# Patient Record
Sex: FEMALE | Race: WHITE | NOT HISPANIC OR LATINO | Employment: UNEMPLOYED | URBAN - METROPOLITAN AREA
[De-identification: names, ages, dates, MRNs, and addresses within clinical notes are randomized per-mention and may not be internally consistent; named-entity substitution may affect disease eponyms.]

---

## 2023-04-28 PROBLEM — K57.30 DIVERTICULOSIS OF COLON: Status: ACTIVE | Noted: 2023-04-28

## 2023-04-28 PROBLEM — E55.9 MILD VITAMIN D DEFICIENCY: Status: ACTIVE | Noted: 2023-04-28

## 2023-04-28 PROBLEM — R60.0 BILATERAL LOWER EXTREMITY EDEMA: Status: ACTIVE | Noted: 2023-04-28

## 2023-04-28 PROBLEM — I34.0 MITRAL VALVE INSUFFICIENCY: Status: ACTIVE | Noted: 2023-04-28

## 2023-04-28 PROBLEM — G89.29 CHRONIC LOW BACK PAIN: Status: ACTIVE | Noted: 2023-04-28

## 2023-04-28 PROBLEM — I10 BENIGN ESSENTIAL HYPERTENSION: Status: ACTIVE | Noted: 2023-04-28

## 2023-04-28 PROBLEM — K59.09 CHRONIC CONSTIPATION: Status: ACTIVE | Noted: 2023-04-28

## 2023-04-28 PROBLEM — I87.2 VENOUS REFLUX: Status: ACTIVE | Noted: 2023-04-28

## 2023-04-28 PROBLEM — E78.5 HYPERLIPIDEMIA: Status: ACTIVE | Noted: 2023-04-28

## 2023-04-28 PROBLEM — R60.0 BILATERAL LOWER EXTREMITY EDEMA: Chronic | Status: ACTIVE | Noted: 2023-04-28

## 2023-04-28 PROBLEM — M54.16 LUMBAR RADICULOPATHY: Status: ACTIVE | Noted: 2023-04-28

## 2023-04-28 PROBLEM — M54.50 CHRONIC LOW BACK PAIN: Status: ACTIVE | Noted: 2023-04-28

## 2023-04-28 PROBLEM — K59.09 CHRONIC CONSTIPATION: Chronic | Status: ACTIVE | Noted: 2023-04-28

## 2023-04-28 PROBLEM — N28.9 MILD RENAL INSUFFICIENCY: Status: ACTIVE | Noted: 2023-04-28

## 2023-04-28 PROBLEM — I10 BENIGN ESSENTIAL HYPERTENSION: Chronic | Status: ACTIVE | Noted: 2023-04-28

## 2023-04-28 RX ORDER — LOSARTAN POTASSIUM 100 MG/1
100 TABLET ORAL DAILY
COMMUNITY
Start: 2022-08-12 | End: 2023-11-01 | Stop reason: SDUPTHER

## 2023-04-28 RX ORDER — POLYETHYLENE GLYCOL 3350 17 G/17G
17 POWDER, FOR SOLUTION ORAL DAILY
COMMUNITY
Start: 2021-09-22

## 2023-04-28 RX ORDER — HYDROCHLOROTHIAZIDE 12.5 MG/1
12.5 CAPSULE ORAL EVERY MORNING
COMMUNITY
Start: 2020-05-06 | End: 2023-11-01 | Stop reason: SDUPTHER

## 2023-04-28 RX ORDER — METOPROLOL TARTRATE 50 MG/1
50 TABLET ORAL 2 TIMES DAILY
COMMUNITY
Start: 2020-03-23 | End: 2023-08-03

## 2023-04-28 RX ORDER — ASPIRIN 81 MG/1
81 TABLET ORAL DAILY
COMMUNITY
Start: 2018-04-24

## 2023-04-28 RX ORDER — GLUCOSAMINE SULFATE DIPOT CHLR 1000 MG
1 TABLET ORAL DAILY
COMMUNITY

## 2023-04-28 RX ORDER — PHENOL 1.4 %
1 AEROSOL, SPRAY (ML) MUCOUS MEMBRANE DAILY
COMMUNITY
Start: 2018-04-24

## 2023-04-28 NOTE — ASSESSMENT & PLAN NOTE
Goal BP >130/80  Continue losartan 100mg, hydrochlorothiazide 12.5mg, and metoprolol 50mg BID  Work on maintaining a healthy weight, monitoring sodium intake, consistent activity and exercise  Avoid chronic use of NSAIDs  Do not use sudafed for decongestant when ill

## 2023-04-28 NOTE — ASSESSMENT & PLAN NOTE
Chronic Venous Insuffienccy vs Sleep Apnea vs RV dysfunction  Cards evaluating  Venous doppler + reflux  Patient declines sleep apnea testing  Swelling improved after switching off amlodipine and onto losartan   Recommendation compression stockings  Consider vein stripping or other vascular intervention

## 2023-05-01 ENCOUNTER — TELEPHONE (OUTPATIENT)
Dept: PRIMARY CARE | Facility: CLINIC | Age: 88
End: 2023-05-01

## 2023-05-01 ENCOUNTER — OFFICE VISIT (OUTPATIENT)
Dept: PRIMARY CARE | Facility: CLINIC | Age: 88
End: 2023-05-01
Payer: MEDICARE

## 2023-05-01 ENCOUNTER — LAB (OUTPATIENT)
Dept: LAB | Facility: LAB | Age: 88
End: 2023-05-01
Payer: MEDICARE

## 2023-05-01 VITALS
DIASTOLIC BLOOD PRESSURE: 72 MMHG | HEIGHT: 61 IN | OXYGEN SATURATION: 98 % | SYSTOLIC BLOOD PRESSURE: 126 MMHG | HEART RATE: 58 BPM | WEIGHT: 155 LBS | BODY MASS INDEX: 29.27 KG/M2 | RESPIRATION RATE: 16 BRPM

## 2023-05-01 DIAGNOSIS — Z00.00 MEDICARE ANNUAL WELLNESS VISIT, SUBSEQUENT: Primary | ICD-10-CM

## 2023-05-01 DIAGNOSIS — R68.89 ABNORMAL ANKLE BRACHIAL INDEX (ABI): ICD-10-CM

## 2023-05-01 DIAGNOSIS — R60.0 BILATERAL LOWER EXTREMITY EDEMA: Chronic | ICD-10-CM

## 2023-05-01 DIAGNOSIS — I87.2 VENOUS REFLUX: ICD-10-CM

## 2023-05-01 DIAGNOSIS — K59.09 CHRONIC CONSTIPATION: Chronic | ICD-10-CM

## 2023-05-01 DIAGNOSIS — R20.9 ALTERATIONS OF SENSATIONS: ICD-10-CM

## 2023-05-01 DIAGNOSIS — I10 BENIGN ESSENTIAL HYPERTENSION: Chronic | ICD-10-CM

## 2023-05-01 PROBLEM — I73.9 CLAUDICATION OF LOWER EXTREMITY (CMS-HCC): Status: ACTIVE | Noted: 2023-05-01

## 2023-05-01 LAB
ALANINE AMINOTRANSFERASE (SGPT) (U/L) IN SER/PLAS: 6 U/L (ref 7–45)
ALBUMIN (G/DL) IN SER/PLAS: 3.7 G/DL (ref 3.4–5)
ALKALINE PHOSPHATASE (U/L) IN SER/PLAS: 45 U/L (ref 33–136)
ANION GAP IN SER/PLAS: 10 MMOL/L (ref 10–20)
ASPARTATE AMINOTRANSFERASE (SGOT) (U/L) IN SER/PLAS: 15 U/L (ref 9–39)
BASOPHILS (10*3/UL) IN BLOOD BY AUTOMATED COUNT: 0.04 X10E9/L (ref 0–0.1)
BASOPHILS/100 LEUKOCYTES IN BLOOD BY AUTOMATED COUNT: 0.7 % (ref 0–2)
BILIRUBIN TOTAL (MG/DL) IN SER/PLAS: 0.5 MG/DL (ref 0–1.2)
CALCIUM (MG/DL) IN SER/PLAS: 9.1 MG/DL (ref 8.6–10.3)
CARBON DIOXIDE, TOTAL (MMOL/L) IN SER/PLAS: 32 MMOL/L (ref 21–32)
CHLORIDE (MMOL/L) IN SER/PLAS: 105 MMOL/L (ref 98–107)
CHOLESTEROL (MG/DL) IN SER/PLAS: 195 MG/DL (ref 0–199)
CHOLESTEROL IN HDL (MG/DL) IN SER/PLAS: 68 MG/DL
CHOLESTEROL/HDL RATIO: 2.9
CREATININE (MG/DL) IN SER/PLAS: 1.07 MG/DL (ref 0.5–1.05)
EOSINOPHILS (10*3/UL) IN BLOOD BY AUTOMATED COUNT: 0.19 X10E9/L (ref 0–0.4)
EOSINOPHILS/100 LEUKOCYTES IN BLOOD BY AUTOMATED COUNT: 3.3 % (ref 0–6)
ERYTHROCYTE DISTRIBUTION WIDTH (RATIO) BY AUTOMATED COUNT: 12.8 % (ref 11.5–14.5)
ERYTHROCYTE MEAN CORPUSCULAR HEMOGLOBIN CONCENTRATION (G/DL) BY AUTOMATED: 32.3 G/DL (ref 32–36)
ERYTHROCYTE MEAN CORPUSCULAR VOLUME (FL) BY AUTOMATED COUNT: 98 FL (ref 80–100)
ERYTHROCYTES (10*6/UL) IN BLOOD BY AUTOMATED COUNT: 4.22 X10E12/L (ref 4–5.2)
GFR FEMALE: 49 ML/MIN/1.73M2
GLUCOSE (MG/DL) IN SER/PLAS: 70 MG/DL (ref 74–99)
HEMATOCRIT (%) IN BLOOD BY AUTOMATED COUNT: 41.5 % (ref 36–46)
HEMOGLOBIN (G/DL) IN BLOOD: 13.4 G/DL (ref 12–16)
IMMATURE GRANULOCYTES/100 LEUKOCYTES IN BLOOD BY AUTOMATED COUNT: 0.2 % (ref 0–0.9)
LDL: 108 MG/DL (ref 0–99)
LEUKOCYTES (10*3/UL) IN BLOOD BY AUTOMATED COUNT: 5.8 X10E9/L (ref 4.4–11.3)
LYMPHOCYTES (10*3/UL) IN BLOOD BY AUTOMATED COUNT: 1.19 X10E9/L (ref 0.8–3)
LYMPHOCYTES/100 LEUKOCYTES IN BLOOD BY AUTOMATED COUNT: 20.5 % (ref 13–44)
MONOCYTES (10*3/UL) IN BLOOD BY AUTOMATED COUNT: 0.4 X10E9/L (ref 0.05–0.8)
MONOCYTES/100 LEUKOCYTES IN BLOOD BY AUTOMATED COUNT: 6.9 % (ref 2–10)
NEUTROPHILS (10*3/UL) IN BLOOD BY AUTOMATED COUNT: 3.97 X10E9/L (ref 1.6–5.5)
NEUTROPHILS/100 LEUKOCYTES IN BLOOD BY AUTOMATED COUNT: 68.4 % (ref 40–80)
PLATELETS (10*3/UL) IN BLOOD AUTOMATED COUNT: 172 X10E9/L (ref 150–450)
POTASSIUM (MMOL/L) IN SER/PLAS: 4.1 MMOL/L (ref 3.5–5.3)
PROTEIN TOTAL: 6.4 G/DL (ref 6.4–8.2)
SODIUM (MMOL/L) IN SER/PLAS: 143 MMOL/L (ref 136–145)
TRIGLYCERIDE (MG/DL) IN SER/PLAS: 97 MG/DL (ref 0–149)
UREA NITROGEN (MG/DL) IN SER/PLAS: 21 MG/DL (ref 6–23)
VLDL: 19 MG/DL (ref 0–40)

## 2023-05-01 PROCEDURE — 85025 COMPLETE CBC W/AUTO DIFF WBC: CPT

## 2023-05-01 PROCEDURE — 1036F TOBACCO NON-USER: CPT | Performed by: STUDENT IN AN ORGANIZED HEALTH CARE EDUCATION/TRAINING PROGRAM

## 2023-05-01 PROCEDURE — G0439 PPPS, SUBSEQ VISIT: HCPCS | Performed by: STUDENT IN AN ORGANIZED HEALTH CARE EDUCATION/TRAINING PROGRAM

## 2023-05-01 PROCEDURE — 80061 LIPID PANEL: CPT

## 2023-05-01 PROCEDURE — 36415 COLL VENOUS BLD VENIPUNCTURE: CPT

## 2023-05-01 PROCEDURE — 1160F RVW MEDS BY RX/DR IN RCRD: CPT | Performed by: STUDENT IN AN ORGANIZED HEALTH CARE EDUCATION/TRAINING PROGRAM

## 2023-05-01 PROCEDURE — 3074F SYST BP LT 130 MM HG: CPT | Performed by: STUDENT IN AN ORGANIZED HEALTH CARE EDUCATION/TRAINING PROGRAM

## 2023-05-01 PROCEDURE — 1159F MED LIST DOCD IN RCRD: CPT | Performed by: STUDENT IN AN ORGANIZED HEALTH CARE EDUCATION/TRAINING PROGRAM

## 2023-05-01 PROCEDURE — 99214 OFFICE O/P EST MOD 30 MIN: CPT | Performed by: STUDENT IN AN ORGANIZED HEALTH CARE EDUCATION/TRAINING PROGRAM

## 2023-05-01 PROCEDURE — 1170F FXNL STATUS ASSESSED: CPT | Performed by: STUDENT IN AN ORGANIZED HEALTH CARE EDUCATION/TRAINING PROGRAM

## 2023-05-01 PROCEDURE — 3078F DIAST BP <80 MM HG: CPT | Performed by: STUDENT IN AN ORGANIZED HEALTH CARE EDUCATION/TRAINING PROGRAM

## 2023-05-01 PROCEDURE — 80053 COMPREHEN METABOLIC PANEL: CPT

## 2023-05-01 RX ORDER — DICLOFENAC SODIUM 10 MG/G
4 GEL TOPICAL 4 TIMES DAILY PRN
Qty: 450 G | Refills: 3 | Status: SHIPPED | OUTPATIENT
Start: 2023-05-01

## 2023-05-01 ASSESSMENT — PATIENT HEALTH QUESTIONNAIRE - PHQ9
SUM OF ALL RESPONSES TO PHQ9 QUESTIONS 1 AND 2: 0
2. FEELING DOWN, DEPRESSED OR HOPELESS: NOT AT ALL
1. LITTLE INTEREST OR PLEASURE IN DOING THINGS: NOT AT ALL

## 2023-05-01 ASSESSMENT — ACTIVITIES OF DAILY LIVING (ADL)
GROCERY_SHOPPING: INDEPENDENT
DOING_HOUSEWORK: INDEPENDENT
MANAGING_FINANCES: INDEPENDENT
TAKING_MEDICATION: INDEPENDENT
BATHING: INDEPENDENT
DRESSING: INDEPENDENT

## 2023-05-01 ASSESSMENT — ENCOUNTER SYMPTOMS
DEPRESSION: 0
OCCASIONAL FEELINGS OF UNSTEADINESS: 0
LOSS OF SENSATION IN FEET: 0

## 2023-05-01 NOTE — ASSESSMENT & PLAN NOTE
PNEUMONIA vaccine- Patient refused  SHINGLES vaccine- Patient refused  Screening tests:  Colon cancer screening--> Not Indicated  Breast Cancer screening--> Not Indicated  Cervical Cancer Screening--> Not Indicated  DXA screening-->Patient states completed in the past   During the course of the visit the patient was educated and counseled about age appropriate screening and preventive services. Completed preventive screenings were documented in the chart and orders were placed for outstanding screenings/procedures as documented in the Assessment and Plan.  Patient Instructions (the written plan) was given to the patient at check out.

## 2023-05-01 NOTE — ASSESSMENT & PLAN NOTE
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

## 2023-05-01 NOTE — ASSESSMENT & PLAN NOTE
With symptoms on R which is not the side with abnormal ABIs will proceed with imaging of the Hip and Lumbar Spine  Patient refuses EMG  Treat arthritis with aspirin and topical voltaren gel  Not able to elicit changes on exam today, overall reassuring  Follow up if symptoms changes

## 2023-05-01 NOTE — TELEPHONE ENCOUNTER
----- Message from Laurie Avina DO sent at 5/1/2023  2:53 PM EDT -----  Labs stable: cholesterol is a little high would incorporate more fiber into diet, kidney function slightly lower, but this seems to be her baseline likely would benefit from better hydration

## 2023-05-01 NOTE — PROGRESS NOTES
"Chief Complaint: Medicare Wellness Exam/Comprehensive Problem Focused Follow Up and Physical Exam    HPI:    Switched off amlodipine to losartan  Leg edema improved but now wearing compression   Does have arthritis  Weather depedent     Having some sensation changes in the R leg in the last year  Sensation changes into foot and sometimes pain from R hip downwards  Non radiating but \"sensation is off\"   Keeps saying it doesn't feel right  Has been ongoing for over 1 year  No trauma of the area    Active Problem List  Problem List       Benign essential hypertension (Chronic)    Bilateral lower extremity edema (Chronic)    Chronic constipation (Chronic)     Comprehensive Medical/Surgical/Social/Family History  Past Medical History:   Diagnosis Date    Body mass index (BMI)30.0-30.9, adult 03/22/2021    Body mass index (BMI) of 30.0 to 30.9 in adult    Personal history of other diseases of the musculoskeletal system and connective tissue     History of osteoporosis    Personal history of other diseases of the nervous system and sense organs     History of cataract    Ventricular premature depolarization 03/23/2020    PVC (premature ventricular contraction)     Past Surgical History:   Procedure Laterality Date    HYSTERECTOMY  04/24/2018    Hysterectomy    OTHER SURGICAL HISTORY  03/23/2020    Cataract surgery     Social History     Social History Narrative    Not on file     Allergies and Medications  Lisinopril and Penicillins  Current Outpatient Medications on File Prior to Visit   Medication Sig Dispense Refill    aspirin 81 mg EC tablet Take 1 tablet (81 mg) by mouth once daily.      calcium carb-D3-mag ox-zinc ox ([employer]ox Mag Zinc Plus D3) 333 mg-133 unit -133 mg-5 mg tablet Take 1 tablet by mouth once daily.      hydroCHLOROthiazide (Microzide) 12.5 mg capsule Take 1 capsule (12.5 mg) by mouth once daily in the morning.      losartan (Cozaar) 100 mg tablet Take 1 tablet (100 mg) by mouth once daily.      metoprolol " "tartrate (Lopressor) 50 mg tablet Take 1 tablet (50 mg) by mouth 2 times a day.      multivitamin-min-iron-FA-vit K (Adults Multivitamin) 18 mg iron-400 mcg-25 mcg tablet Take 1 tablet by mouth once daily.      polyethylene glycol (Glycolax) 17 gram/dose powder Take 17 g by mouth once daily.       No current facility-administered medications on file prior to visit.     Medications and Supplements  prescribed by me and other practitioners or clinical pharmacist (such as prescriptions, OTC's, herbal therapies and supplements) were reviewed and documented in the medical record.    Tobacco/Alcohol/Opioid use, as well as Illicit Drug Use was screened for/reviewed and documented in Social History section and medication list as appropriate    Steadi Fall Risk  One or more falls in the last year? No  How many Times?    Was the patient injured in the fall?    Has trouble stepping onto curb? No  Advised to use a cane or walker to get around safely? No  Often has to rush to toilet? No  Feels unsteady when walking? No  Has lost some feeling in feet? No  Often feels sad or depressed? No  Steadies self on furniture while walking at home? No  Takes medicine that makes them feel lightheaded or more tired than usual? No  Worried about Falling? No  Takes medicine to sleep or improve mood? No  Needs to push with hands when rising from a chair? No    Activities of Daily Living  In your present state of health, do you have any difficulty performing the following activities?:   Preparing food and eating?: No  Bathing yourself: No  Getting dressed: No  Using the toilet:No  Moving around from place to place: No  In the past year have you fallen or had a near fall?:No    Depression Screen  (Note: if answer to either of the following is \"Yes\", then a more complete depression screening is indicated)   Q1: Over the past two weeks, have you felt down, depressed or hopeless? No  Q2: Over the past two weeks, have you felt little interest or " "pleasure in doing things? No    Current exercise habits: infrequent at best   Dietary issues discussed: Yes  Hearing difficulties: No  Safe in current home environment: yes  Visual Acuity assessed: no  Cognitive Impairment assessed: yes     Cardiac Risk Assessment  Cardiovascular risk was discussed and, if needed, lifestyle modifications recommended, including nutritional choices, exercise, and elimination of habits contributing to risk. We agreed on a plan to reduce the current cardiovascular risk based on above discussion as needed.  Aspirin use/disuse was discussed after reviewing the updated guidelines below:    Consider low dose Aspirin ( mg) use if the benefit for cardiovascular disease prevention outweighs risk for bleeding complications.   In general, low dose ASA should be considered:  In patients WITHOUT prior MI/stroke/PAD (primary prevention):   a. Age <60: Use if 10-year cardiovascular disease risk >20%, with discussion of risks and benefits with patient  b. Age 60-<70: Use if 10-year cardiovascular disease risk >20% and low bleeding (e.g., gastrointenstinal) risk, with discussion of risks and benefits with patient  c. Age >=70: Do not use    In patients WITH prior MI/stroke/PAD (secondary prevention):   Generally use unless extremely high bleeding (e.g., gastrointenstinal) risk, with discussion of risks and benefits with patient    ROS otherwise negative aside from what was mentioned above in HPI.    Vitals  /72   Pulse 58   Resp 16   Ht 1.549 m (5' 1\")   Wt 70.3 kg (155 lb)   SpO2 98%   BMI 29.29 kg/m²   Body mass index is 29.29 kg/m².    Physical Exam  Constitutional:       Appearance: Normal appearance.   HENT:      Head: Normocephalic and atraumatic.   Eyes:      Extraocular Movements: Extraocular movements intact.   Cardiovascular:      Rate and Rhythm: Normal rate and regular rhythm.      Heart sounds: No murmur heard.  Pulmonary:      Effort: Pulmonary effort is normal. No " respiratory distress.   Musculoskeletal:      Right lower leg: No edema.      Left lower leg: No edema.   Skin:     Coloration: Skin is not jaundiced or pale.   Neurological:      General: No focal deficit present.      Mental Status: She is alert and oriented to person, place, and time.      Cranial Nerves: No cranial nerve deficit.      Motor: No weakness.      Gait: Gait normal.      Comments: On exam not able to elicit sensation changes described by patient   Psychiatric:         Mood and Affect: Mood normal.         Behavior: Behavior normal.         Thought Content: Thought content normal.         Judgment: Judgment normal.     Assessment and Plan:  Problem List Items Addressed This Visit          Nervous    Alterations of sensations    Current Assessment & Plan     With symptoms on R which is not the side with abnormal ABIs will proceed with imaging of the Hip and Lumbar Spine  Patient refuses EMG  Treat arthritis with aspirin and topical voltaren gel  Not able to elicit changes on exam today, overall reassuring  Follow up if symptoms changes         Relevant Medications    diclofenac sodium (Voltaren) 1 % gel gel    Other Relevant Orders    XR lumbar spine complete 4+ views    XR hip right 2 or 3 views       Circulatory    Benign essential hypertension (Chronic)    Current Assessment & Plan     Goal BP >130/80  Continue losartan 100mg, hydrochlorothiazide 12.5mg, and metoprolol 50mg BID  Work on maintaining a healthy weight, monitoring sodium intake, consistent activity and exercise  Avoid chronic use of NSAIDs  Do not use sudafed for decongestant when ill           Venous reflux    Current Assessment & Plan     Chronic Venous Insuffienccy vs Sleep Apnea vs RV dysfunction  Cards evaluating  Venous doppler + reflux  Patient declines sleep apnea testing  Swelling improved after switching off amlodipine and onto losartan   Recommendation compression stockings  Consider vein stripping or other vascular  intervention          Abnormal ankle brachial index (AMANDA)    Current Assessment & Plan     AMANDA performed 09/02/2022 revealed 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         Relevant Orders    Lipid Panel       Digestive    Chronic constipation (Chronic)    Current Assessment & Plan     Being managed with miralax daily             Musculoskeletal    Bilateral lower extremity edema (Chronic)    Current Assessment & Plan     Chronic Venous Insuffienccy vs Sleep Apnea vs RV dysfunction  Cards evaluating  Venous doppler + reflux  Patient declines sleep apnea testing  Swelling improved after switching off amlodipine and onto losartan   Recommendation compression stockings  Consider vein stripping or other vascular intervention          Relevant Orders    CBC and Auto Differential    Comprehensive metabolic panel       Other    Medicare annual wellness visit, subsequent - Primary    Current Assessment & Plan     PNEUMONIA vaccine- Patient refused  SHINGLES vaccine- Patient refused  Screening tests:  Colon cancer screening--> Not Indicated  Breast Cancer screening--> Not Indicated  Cervical Cancer Screening--> Not Indicated  DXA screening-->Patient states completed in the past   During the course of the visit the patient was educated and counseled about age appropriate screening and preventive services. Completed preventive screenings were documented in the chart and orders were placed for outstanding screenings/procedures as documented in the Assessment and Plan.  Patient Instructions (the written plan) was given to the patient at check out.          Laurie Avina,

## 2023-05-03 ENCOUNTER — TELEPHONE (OUTPATIENT)
Dept: PRIMARY CARE | Facility: CLINIC | Age: 88
End: 2023-05-03
Payer: MEDICARE

## 2023-05-03 NOTE — TELEPHONE ENCOUNTER
----- Message from Laurie Avina DO sent at 5/3/2023  7:31 AM EDT -----  Xrays showing progressed age related degeneration of bone of the spine, this is likely the source of her discomfort. Try voltaren gel to see if helps with symptoms.

## 2023-08-03 DIAGNOSIS — I10 BENIGN ESSENTIAL HYPERTENSION: Primary | ICD-10-CM

## 2023-08-03 RX ORDER — METOPROLOL TARTRATE 50 MG/1
50 TABLET ORAL 2 TIMES DAILY
Qty: 180 TABLET | Refills: 0 | Status: SHIPPED | OUTPATIENT
Start: 2023-08-03 | End: 2023-11-01 | Stop reason: SDUPTHER

## 2023-11-01 ENCOUNTER — OFFICE VISIT (OUTPATIENT)
Dept: PRIMARY CARE | Facility: CLINIC | Age: 88
End: 2023-11-01
Payer: MEDICARE

## 2023-11-01 VITALS
BODY MASS INDEX: 28.13 KG/M2 | RESPIRATION RATE: 16 BRPM | HEIGHT: 61 IN | OXYGEN SATURATION: 99 % | HEART RATE: 58 BPM | DIASTOLIC BLOOD PRESSURE: 70 MMHG | SYSTOLIC BLOOD PRESSURE: 137 MMHG | WEIGHT: 149 LBS

## 2023-11-01 DIAGNOSIS — Z23 NEEDS FLU SHOT: ICD-10-CM

## 2023-11-01 DIAGNOSIS — E78.49 OTHER HYPERLIPIDEMIA: ICD-10-CM

## 2023-11-01 DIAGNOSIS — I10 BENIGN ESSENTIAL HYPERTENSION: Primary | ICD-10-CM

## 2023-11-01 DIAGNOSIS — N28.9 MILD RENAL INSUFFICIENCY: ICD-10-CM
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
## 2023-11-01 PROCEDURE — 99214 OFFICE O/P EST MOD 30 MIN: CPT | Performed by: STUDENT IN AN ORGANIZED HEALTH CARE EDUCATION/TRAINING PROGRAM

## 2023-11-01 PROCEDURE — G0008 ADMIN INFLUENZA VIRUS VAC: HCPCS | Performed by: STUDENT IN AN ORGANIZED HEALTH CARE EDUCATION/TRAINING PROGRAM

## 2023-11-01 PROCEDURE — 1159F MED LIST DOCD IN RCRD: CPT | Performed by: STUDENT IN AN ORGANIZED HEALTH CARE EDUCATION/TRAINING PROGRAM

## 2023-11-01 PROCEDURE — 1160F RVW MEDS BY RX/DR IN RCRD: CPT | Performed by: STUDENT IN AN ORGANIZED HEALTH CARE EDUCATION/TRAINING PROGRAM

## 2023-11-01 PROCEDURE — 3075F SYST BP GE 130 - 139MM HG: CPT | Performed by: STUDENT IN AN ORGANIZED HEALTH CARE EDUCATION/TRAINING PROGRAM

## 2023-11-01 PROCEDURE — 1036F TOBACCO NON-USER: CPT | Performed by: STUDENT IN AN ORGANIZED HEALTH CARE EDUCATION/TRAINING PROGRAM

## 2023-11-01 PROCEDURE — 90662 IIV NO PRSV INCREASED AG IM: CPT | Performed by: STUDENT IN AN ORGANIZED HEALTH CARE EDUCATION/TRAINING PROGRAM

## 2023-11-01 PROCEDURE — 3078F DIAST BP <80 MM HG: CPT | Performed by: STUDENT IN AN ORGANIZED HEALTH CARE EDUCATION/TRAINING PROGRAM

## 2023-11-01 RX ORDER — HYDROCHLOROTHIAZIDE 12.5 MG/1
12.5 CAPSULE ORAL EVERY MORNING
Qty: 90 CAPSULE | Refills: 1 | Status: SHIPPED | OUTPATIENT
Start: 2023-11-01 | End: 2024-05-02 | Stop reason: WASHOUT

## 2023-11-01 RX ORDER — METOPROLOL TARTRATE 50 MG/1
50 TABLET ORAL 2 TIMES DAILY
Qty: 180 TABLET | Refills: 1 | Status: SHIPPED | OUTPATIENT
Start: 2023-11-01 | End: 2024-05-02 | Stop reason: SDUPTHER

## 2023-11-01 RX ORDER — LOSARTAN POTASSIUM 100 MG/1
100 TABLET ORAL DAILY
Qty: 90 TABLET | Refills: 1 | Status: SHIPPED | OUTPATIENT
Start: 2023-11-01

## 2023-11-01 ASSESSMENT — ENCOUNTER SYMPTOMS
CONFUSION: 0
OCCASIONAL FEELINGS OF UNSTEADINESS: 0
LOSS OF SENSATION IN FEET: 0
ACTIVITY CHANGE: 0
LIGHT-HEADEDNESS: 0
HEADACHES: 0
FEVER: 0
PALPITATIONS: 0
DEPRESSION: 0
ARTHRALGIAS: 1
SHORTNESS OF BREATH: 0
WOUND: 1

## 2023-11-01 ASSESSMENT — PATIENT HEALTH QUESTIONNAIRE - PHQ9
1. LITTLE INTEREST OR PLEASURE IN DOING THINGS: NOT AT ALL
2. FEELING DOWN, DEPRESSED OR HOPELESS: NOT AT ALL
SUM OF ALL RESPONSES TO PHQ9 QUESTIONS 1 AND 2: 0

## 2023-11-01 NOTE — PROGRESS NOTES
"Patient Name:  Linda Roberto  MRN:  [id number]  :  1932    Subjective   Patient ID: Linda Roberto is a 89 y.o. female who presents for Follow-up.    HPI     89 yo female presents for 6 month follow up    Had a fall last month. Getting up out of chair and leg was asleep.   No LOC, hitting of head  No residual pain     Her systolic reading is elevated in office today   Has not taken medication yet this am     Review of Systems   Constitutional:  Negative for activity change and fever.   Respiratory:  Negative for shortness of breath.    Cardiovascular:  Negative for chest pain, palpitations and leg swelling.   Musculoskeletal:  Positive for arthralgias. Negative for gait problem.   Skin:  Positive for wound.   Allergic/Immunologic: Negative for immunocompromised state.   Neurological:  Negative for light-headedness and headaches.   Psychiatric/Behavioral:  Negative for confusion.      Objective   /70   Pulse 58   Resp 16   Ht 1.549 m (5' 1\")   Wt 67.6 kg (149 lb)   SpO2 99%   BMI 28.15 kg/m²     Physical Exam  Constitutional:       Appearance: Normal appearance.   HENT:      Head: Normocephalic and atraumatic.   Eyes:      Extraocular Movements: Extraocular movements intact.   Cardiovascular:      Rate and Rhythm: Normal rate and regular rhythm.   Pulmonary:      Effort: Pulmonary effort is normal. No respiratory distress.   Musculoskeletal:      Right lower leg: No edema.      Left lower leg: No edema.   Skin:     Coloration: Skin is not jaundiced or pale.      Comments: Small infamed area of R upper lip, from cryo at derm yesterday    Neurological:      General: No focal deficit present.      Mental Status: She is alert and oriented to person, place, and time.   Psychiatric:         Mood and Affect: Mood normal.         Behavior: Behavior normal.         Thought Content: Thought content normal.         Judgment: Judgment normal.     Assessment/Plan   Problem List Items Addressed This Visit             " ICD-10-CM    Benign essential hypertension - Primary (Chronic) I10     Goal BP >130/80  Continue medications, with BP in office today, take home pressures I think we may be able to start to wean off medications in the future given stability   Work on maintaining a healthy weight, monitoring sodium intake, consistent activity and exercise  Avoid chronic use of NSAIDs  Do not use sudafed for decongestant when ill           Relevant Medications    hydroCHLOROthiazide (Microzide) 12.5 mg capsule    losartan (Cozaar) 100 mg tablet    metoprolol tartrate (Lopressor) 50 mg tablet    Other Relevant Orders    Follow Up In [facility] - PCP - Established    Hyperlipidemia E78.5    Relevant Orders    Lipid Panel    Mild renal insufficiency N28.9    Relevant Orders    CBC and Auto Differential    Comprehensive metabolic panel    Fall W19.XXXA     Working on slow and steady  Does not have easy surfaces to trip over, only 1 rug and flat            Other Visit Diagnoses         Codes    Needs flu shot     Z23    Relevant Orders    Flu vaccine, quadrivalent, high-dose, preservative free, age 65y+ (FLUZONE)

## 2023-11-01 NOTE — ASSESSMENT & PLAN NOTE
Goal BP >130/80  Continue medications, with BP in office today, take home pressures I think we may be able to start to wean off medications in the future given stability   Work on maintaining a healthy weight, monitoring sodium intake, consistent activity and exercise  Avoid chronic use of NSAIDs  Do not use sudafed for decongestant when ill

## 2023-12-07 ENCOUNTER — TELEPHONE (OUTPATIENT)
Dept: PRIMARY CARE | Facility: CLINIC | Age: 88
End: 2023-12-07
Payer: MEDICARE
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
## 2023-12-07 DIAGNOSIS — M79.671 RIGHT FOOT PAIN: Primary | ICD-10-CM

## 2023-12-07 NOTE — TELEPHONE ENCOUNTER
She fell one month ago , toes hurt on R foot & arch hurts also    She called office when this happened & was told to go to ER , but she did not want to go.    Asking for appointment or order for x-ray.     Your recommendation ?

## 2023-12-07 NOTE — TELEPHONE ENCOUNTER
Xray ordered. Recommend putting water bottle in the freezer and using this to ice, sock on over foot. This will help target the arch of her foot.

## 2023-12-09 ENCOUNTER — HOSPITAL ENCOUNTER (OUTPATIENT)
Dept: RADIOLOGY | Facility: HOSPITAL | Age: 88
Discharge: HOME | End: 2023-12-09
Payer: MEDICARE

## 2023-12-09 DIAGNOSIS — M79.671 RIGHT FOOT PAIN: ICD-10-CM
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
## 2023-12-09 PROCEDURE — 73620 X-RAY EXAM OF FOOT: CPT | Mod: RIGHT SIDE | Performed by: RADIOLOGY

## 2023-12-09 PROCEDURE — 73620 X-RAY EXAM OF FOOT: CPT | Mod: RT,FY

## 2023-12-11 ENCOUNTER — TELEPHONE (OUTPATIENT)
Dept: PRIMARY CARE | Facility: CLINIC | Age: 88
End: 2023-12-11
Payer: MEDICARE

## 2023-12-11 NOTE — TELEPHONE ENCOUNTER
----- Message from Laurie Avina DO sent at 12/11/2023  2:20 PM EST -----  No fractures seen on xray. There was some swelling of the foot- compression stocking/supportive footwear/ice is ok to use. There was also some artifact noted along the lateral foot, will keep an eye and repeat imaging if symptoms do not improve.

## 2024-04-30 ENCOUNTER — LAB (OUTPATIENT)
Dept: LAB | Facility: LAB | Age: 89
End: 2024-04-30
Payer: MEDICARE

## 2024-04-30 DIAGNOSIS — N28.9 MILD RENAL INSUFFICIENCY: ICD-10-CM

## 2024-04-30 DIAGNOSIS — E78.49 OTHER HYPERLIPIDEMIA: ICD-10-CM

## 2024-04-30 LAB
ALBUMIN SERPL BCP-MCNC: 3.9 G/DL (ref 3.4–5)
ALP SERPL-CCNC: 42 U/L (ref 33–136)
ALT SERPL W P-5'-P-CCNC: 7 U/L (ref 7–45)
ANION GAP SERPL CALC-SCNC: 10 MMOL/L (ref 10–20)
AST SERPL W P-5'-P-CCNC: 15 U/L (ref 9–39)
BASOPHILS # BLD AUTO: 0.03 X10*3/UL (ref 0–0.1)
BASOPHILS NFR BLD AUTO: 0.6 %
BILIRUB SERPL-MCNC: 0.4 MG/DL (ref 0–1.2)
BUN SERPL-MCNC: 22 MG/DL (ref 6–23)
CALCIUM SERPL-MCNC: 9.2 MG/DL (ref 8.6–10.3)
CHLORIDE SERPL-SCNC: 103 MMOL/L (ref 98–107)
CHOLEST SERPL-MCNC: 182 MG/DL (ref 0–199)
CHOLESTEROL/HDL RATIO: 3.2
CO2 SERPL-SCNC: 33 MMOL/L (ref 21–32)
CREAT SERPL-MCNC: 1.47 MG/DL (ref 0.5–1.05)
EGFRCR SERPLBLD CKD-EPI 2021: 34 ML/MIN/1.73M*2
EOSINOPHIL # BLD AUTO: 0.16 X10*3/UL (ref 0–0.4)
EOSINOPHIL NFR BLD AUTO: 3.5 %
ERYTHROCYTE [DISTWIDTH] IN BLOOD BY AUTOMATED COUNT: 12.7 % (ref 11.5–14.5)
GLUCOSE SERPL-MCNC: 89 MG/DL (ref 74–99)
HCT VFR BLD AUTO: 39.5 % (ref 36–46)
HDLC SERPL-MCNC: 57.6 MG/DL
HGB BLD-MCNC: 12.9 G/DL (ref 12–16)
IMM GRANULOCYTES # BLD AUTO: 0.01 X10*3/UL (ref 0–0.5)
IMM GRANULOCYTES NFR BLD AUTO: 0.2 % (ref 0–0.9)
LDLC SERPL CALC-MCNC: 108 MG/DL
LYMPHOCYTES # BLD AUTO: 1.37 X10*3/UL (ref 0.8–3)
LYMPHOCYTES NFR BLD AUTO: 29.6 %
MCH RBC QN AUTO: 31.5 PG (ref 26–34)
MCHC RBC AUTO-ENTMCNC: 32.7 G/DL (ref 32–36)
MCV RBC AUTO: 96 FL (ref 80–100)
MONOCYTES # BLD AUTO: 0.4 X10*3/UL (ref 0.05–0.8)
MONOCYTES NFR BLD AUTO: 8.6 %
NEUTROPHILS # BLD AUTO: 2.66 X10*3/UL (ref 1.6–5.5)
NEUTROPHILS NFR BLD AUTO: 57.5 %
NON HDL CHOLESTEROL: 124 MG/DL (ref 0–149)
NRBC BLD-RTO: 0 /100 WBCS (ref 0–0)
PLATELET # BLD AUTO: 154 X10*3/UL (ref 150–450)
POTASSIUM SERPL-SCNC: 4.3 MMOL/L (ref 3.5–5.3)
PROT SERPL-MCNC: 6.7 G/DL (ref 6.4–8.2)
RBC # BLD AUTO: 4.1 X10*6/UL (ref 4–5.2)
SODIUM SERPL-SCNC: 142 MMOL/L (ref 136–145)
TRIGL SERPL-MCNC: 82 MG/DL (ref 0–149)
VLDL: 16 MG/DL (ref 0–40)
WBC # BLD AUTO: 4.6 X10*3/UL (ref 4.4–11.3)

## 2024-04-30 PROCEDURE — 36415 COLL VENOUS BLD VENIPUNCTURE: CPT

## 2024-04-30 PROCEDURE — 85025 COMPLETE CBC W/AUTO DIFF WBC: CPT

## 2024-04-30 PROCEDURE — 80061 LIPID PANEL: CPT

## 2024-04-30 PROCEDURE — 80053 COMPREHEN METABOLIC PANEL: CPT

## 2024-05-01 PROBLEM — R68.89 ABNORMAL ANKLE BRACHIAL INDEX (ABI): Chronic | Status: ACTIVE | Noted: 2023-05-01

## 2024-05-01 NOTE — ASSESSMENT & PLAN NOTE
PNEUMONIA vaccine- declined, may have already been completed   SHINGLES vaccine- Recommended at pharmacy   INFLUENZA vaccine- Completed  Screening tests:  Colon cancer screening--> Not indicated  Breast Cancer screening--> Not indicated  Cervical Cancer Screening--> Not indicated  DXA screening--> Declines     During the course of the visit the patient was educated and counseled about age appropriate screening and preventive services. Completed preventive screenings were documented in the chart and orders were placed for outstanding screenings/procedures as documented in the Assessment and Plan.    Patient Instructions (the written plan) was given to the patient at check out that include any community based lifestyle interventions.    Other risk factors and conditions for which interventions are recommended are addressed as the other tagged diagnoses in this encounter.

## 2024-05-01 NOTE — ASSESSMENT & PLAN NOTE
Goal BP less than 130/80  Continue losartan 100mg, metoprolol 50mg BID   Stopping hydrochlorothiazide to see if we can improve GFR  Work on maintaining a healthy weight, monitoring sodium intake, consistent activity and exercise  Avoid chronic use of NSAIDs  Do not use sudafed for decongestant when ill

## 2024-05-02 ENCOUNTER — OFFICE VISIT (OUTPATIENT)
Dept: PRIMARY CARE | Facility: CLINIC | Age: 89
End: 2024-05-02
Payer: MEDICARE

## 2024-05-02 VITALS
HEIGHT: 61 IN | WEIGHT: 142 LBS | HEART RATE: 52 BPM | SYSTOLIC BLOOD PRESSURE: 126 MMHG | BODY MASS INDEX: 26.81 KG/M2 | OXYGEN SATURATION: 99 % | DIASTOLIC BLOOD PRESSURE: 72 MMHG

## 2024-05-02 DIAGNOSIS — Z00.00 MEDICARE ANNUAL WELLNESS VISIT, SUBSEQUENT: Primary | ICD-10-CM

## 2024-05-02 DIAGNOSIS — I10 BENIGN ESSENTIAL HYPERTENSION: Chronic | ICD-10-CM

## 2024-05-02 DIAGNOSIS — N18.32 STAGE 3B CHRONIC KIDNEY DISEASE (MULTI): ICD-10-CM

## 2024-05-02 DIAGNOSIS — R68.89 ABNORMAL ANKLE BRACHIAL INDEX (ABI): Chronic | ICD-10-CM

## 2024-05-02 PROBLEM — N28.9 MILD RENAL INSUFFICIENCY: Status: RESOLVED | Noted: 2023-04-28 | Resolved: 2024-05-02

## 2024-05-02 PROCEDURE — 3074F SYST BP LT 130 MM HG: CPT | Performed by: STUDENT IN AN ORGANIZED HEALTH CARE EDUCATION/TRAINING PROGRAM

## 2024-05-02 PROCEDURE — 1159F MED LIST DOCD IN RCRD: CPT | Performed by: STUDENT IN AN ORGANIZED HEALTH CARE EDUCATION/TRAINING PROGRAM

## 2024-05-02 PROCEDURE — 1158F ADVNC CARE PLAN TLK DOCD: CPT | Performed by: STUDENT IN AN ORGANIZED HEALTH CARE EDUCATION/TRAINING PROGRAM

## 2024-05-02 PROCEDURE — 1036F TOBACCO NON-USER: CPT | Performed by: STUDENT IN AN ORGANIZED HEALTH CARE EDUCATION/TRAINING PROGRAM

## 2024-05-02 PROCEDURE — 1160F RVW MEDS BY RX/DR IN RCRD: CPT | Performed by: STUDENT IN AN ORGANIZED HEALTH CARE EDUCATION/TRAINING PROGRAM

## 2024-05-02 PROCEDURE — G0439 PPPS, SUBSEQ VISIT: HCPCS | Performed by: STUDENT IN AN ORGANIZED HEALTH CARE EDUCATION/TRAINING PROGRAM

## 2024-05-02 PROCEDURE — 1123F ACP DISCUSS/DSCN MKR DOCD: CPT | Performed by: STUDENT IN AN ORGANIZED HEALTH CARE EDUCATION/TRAINING PROGRAM

## 2024-05-02 PROCEDURE — 1170F FXNL STATUS ASSESSED: CPT | Performed by: STUDENT IN AN ORGANIZED HEALTH CARE EDUCATION/TRAINING PROGRAM

## 2024-05-02 PROCEDURE — 3078F DIAST BP <80 MM HG: CPT | Performed by: STUDENT IN AN ORGANIZED HEALTH CARE EDUCATION/TRAINING PROGRAM

## 2024-05-02 PROCEDURE — 99214 OFFICE O/P EST MOD 30 MIN: CPT | Performed by: STUDENT IN AN ORGANIZED HEALTH CARE EDUCATION/TRAINING PROGRAM

## 2024-05-02 RX ORDER — METOPROLOL TARTRATE 50 MG/1
50 TABLET ORAL 2 TIMES DAILY
Qty: 180 TABLET | Refills: 3 | Status: SHIPPED | OUTPATIENT
Start: 2024-05-02

## 2024-05-02 RX ORDER — HYDROCHLOROTHIAZIDE 12.5 MG/1
12.5 CAPSULE ORAL EVERY MORNING
Qty: 90 CAPSULE | Refills: 3 | Status: CANCELLED | OUTPATIENT
Start: 2024-05-02

## 2024-05-02 ASSESSMENT — ENCOUNTER SYMPTOMS
LOSS OF SENSATION IN FEET: 0
WHEEZING: 0
DEPRESSION: 0
OCCASIONAL FEELINGS OF UNSTEADINESS: 0
CONFUSION: 0
COUGH: 0
SHORTNESS OF BREATH: 0
APPETITE CHANGE: 0
DECREASED CONCENTRATION: 0
DYSPHORIC MOOD: 0
PALPITATIONS: 0
ACTIVITY CHANGE: 0
FACIAL ASYMMETRY: 0

## 2024-05-02 ASSESSMENT — ACTIVITIES OF DAILY LIVING (ADL)
DRESSING: INDEPENDENT
BATHING: INDEPENDENT
GROCERY_SHOPPING: NEEDS ASSISTANCE
MANAGING_FINANCES: TOTAL CARE
DOING_HOUSEWORK: INDEPENDENT
TAKING_MEDICATION: INDEPENDENT

## 2024-05-02 NOTE — ASSESSMENT & PLAN NOTE
GFR trend--> 49, 49, 34  Urine Albumin in the last year No will add to next labs   BP-goal < 130/80: YES   On ACE or ARB: YES  DM II- goal of <7%-    On SLGT2: NA  Avoid nephrotoxic agents/Adjusting nephrotoxic agents--> discussion today of possibly stopping her hydrochlorothiazide, this is a longstanding medication but could be contributing to this change   Recommendation healthy physcial activity and heart healthy diet  Hydration with non sugar added or diuretic liquids

## 2024-05-02 NOTE — PROGRESS NOTES
Subjective   Reason for Visit: Linda Roberto is an 89 y.o. female here for a Medicare Wellness visit.     Past Medical, Surgical, and Family History reviewed and updated in chart.    Reviewed all medications by prescribing practitioner or clinical pharmacist (such as prescriptions, OTCs, herbal therapies and supplements) and documented in the medical record.    HPI    89 yo female presents for follow up and medicare wellness visit     LDL Calculated  <=99 mg/dL 108 High  108 High  R,     Creatinine  0.50 - 1.05 mg/dL 1.47 High  1.07 High  1.07 High  1.07 High  1.11 High  1.10 High    eGFR  >60 mL/min/1.73m*2 34 Low  49 Abnormal           Patient Care Team:  Laurie Avina DO as PCP - General (Family Medicine)  Laurie Avina DO as PCP - [facility] Attributed Provider  Sunil Woods MD as Consulting Physician (Cardiology)     Past Medical History:   Diagnosis Date    Body mass index (BMI)30.0-30.9, adult 03/22/2021    Body mass index (BMI) of 30.0 to 30.9 in adult    Personal history of other diseases of the musculoskeletal system and connective tissue     History of osteoporosis    Personal history of other diseases of the nervous system and sense organs     History of cataract    Ventricular premature depolarization 03/23/2020    PVC (premature ventricular contraction)     Past Surgical History:   Procedure Laterality Date    HYSTERECTOMY  04/24/2018    Hysterectomy    OTHER SURGICAL HISTORY  03/23/2020    Cataract surgery     Family History   Problem Relation Name Age of Onset    Hypertension Mother       Body mass index is 26.83 kg/m².    Tobacco/Alcohol/Opioid use, as well as Illicit Drug Use was screened for/reviewed and documented in Social History section and medication list as appropriate    Medications and Supplements  prescribed by me and other practitioners or clinical pharmacist (such as prescriptions, OTC's, herbal therapies and supplements) were reviewed and documented in the medical record.   "  Tobacco/Alcohol/Opioid use, as well as Illicit Drug Use was screened for/reviewed and documented in Social History section and medication list as appropriate    Review of Systems   Constitutional:  Negative for activity change and appetite change.   Respiratory:  Negative for cough, shortness of breath and wheezing.    Cardiovascular:  Negative for chest pain, palpitations and leg swelling.   Allergic/Immunologic: Negative for immunocompromised state.   Neurological:  Negative for facial asymmetry.   Psychiatric/Behavioral:  Negative for behavioral problems, confusion, decreased concentration and dysphoric mood.      Objective   Vitals:  /72 (BP Location: Left arm, Patient Position: Sitting)   Pulse 52   Ht 1.549 m (5' 1\")   Wt 64.4 kg (142 lb)   SpO2 99%   BMI 26.83 kg/m²       Physical Exam  Constitutional:       Appearance: Normal appearance.   HENT:      Head: Normocephalic and atraumatic.      Right Ear: Tympanic membrane normal.      Left Ear: Tympanic membrane normal.   Cardiovascular:      Rate and Rhythm: Normal rate and regular rhythm.   Pulmonary:      Effort: Pulmonary effort is normal. No respiratory distress.      Breath sounds: No wheezing.   Musculoskeletal:      Right lower leg: No edema.      Left lower leg: No edema.   Skin:     Coloration: Skin is not jaundiced or pale.   Neurological:      General: No focal deficit present.      Mental Status: She is alert and oriented to person, place, and time.   Psychiatric:         Mood and Affect: Mood normal.         Behavior: Behavior normal.     Assessment/Plan   Problem List Items Addressed This Visit       Benign essential hypertension (Chronic)    Current Assessment & Plan     Goal BP less than 130/80  Continue losartan 100mg, metoprolol 50mg BID   Stopping hydrochlorothiazide to see if we can improve GFR  Work on maintaining a healthy weight, monitoring sodium intake, consistent activity and exercise  Avoid chronic use of NSAIDs  Do not " use sudafed for decongestant when ill           Relevant Medications    metoprolol tartrate (Lopressor) 50 mg tablet    Medicare annual wellness visit, subsequent - Primary    Current Assessment & Plan     PNEUMONIA vaccine- declined, may have already been completed   SHINGLES vaccine- Recommended at pharmacy   INFLUENZA vaccine- Completed  Screening tests:  Colon cancer screening--> Not indicated  Breast Cancer screening--> Not indicated  Cervical Cancer Screening--> Not indicated  DXA screening--> Declines     During the course of the visit the patient was educated and counseled about age appropriate screening and preventive services. Completed preventive screenings were documented in the chart and orders were placed for outstanding screenings/procedures as documented in the Assessment and Plan.    Patient Instructions (the written plan) was given to the patient at check out that include any community based lifestyle interventions.    Other risk factors and conditions for which interventions are recommended are addressed as the other tagged diagnoses in this encounter.            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          Current Assessment & Plan     Asymptomatic  Continue aspirin therapy          Stage 3b chronic kidney disease (Multi)    Current Assessment & Plan     GFR trend--> 49, 49, 34  Urine Albumin in the last year No will add to next labs   BP-goal < 130/80: YES   On ACE or ARB: YES  DM II- goal of <7%-    On SLGT2: NA  Avoid nephrotoxic agents/Adjusting nephrotoxic agents--> discussion today of possibly stopping her hydrochlorothiazide, this is a longstanding medication but could be contributing to this change   Recommendation healthy physcial activity and heart healthy  diet  Hydration with non sugar added or diuretic liquids           Relevant Orders    Comprehensive Metabolic Panel    Follow Up In [facility] - PCP - Established

## 2024-06-13 ENCOUNTER — OFFICE VISIT (OUTPATIENT)
Dept: PRIMARY CARE | Facility: CLINIC | Age: 89
End: 2024-06-13
Payer: MEDICARE

## 2024-06-13 VITALS
WEIGHT: 146 LBS | BODY MASS INDEX: 27.56 KG/M2 | HEIGHT: 61 IN | HEART RATE: 61 BPM | SYSTOLIC BLOOD PRESSURE: 148 MMHG | DIASTOLIC BLOOD PRESSURE: 74 MMHG | OXYGEN SATURATION: 94 %

## 2024-06-13 DIAGNOSIS — R60.0 BILATERAL LOWER EXTREMITY EDEMA: Primary | Chronic | ICD-10-CM

## 2024-06-13 PROCEDURE — 3077F SYST BP >= 140 MM HG: CPT

## 2024-06-13 PROCEDURE — 3078F DIAST BP <80 MM HG: CPT

## 2024-06-13 PROCEDURE — 99213 OFFICE O/P EST LOW 20 MIN: CPT

## 2024-06-13 PROCEDURE — 1123F ACP DISCUSS/DSCN MKR DOCD: CPT

## 2024-06-13 PROCEDURE — 1036F TOBACCO NON-USER: CPT

## 2024-06-13 PROCEDURE — 1160F RVW MEDS BY RX/DR IN RCRD: CPT

## 2024-06-13 PROCEDURE — 1159F MED LIST DOCD IN RCRD: CPT

## 2024-06-13 ASSESSMENT — ENCOUNTER SYMPTOMS
EYES NEGATIVE: 1
MUSCULOSKELETAL NEGATIVE: 1
GASTROINTESTINAL NEGATIVE: 1
NEUROLOGICAL NEGATIVE: 1
DEPRESSION: 0
PSYCHIATRIC NEGATIVE: 1
HEMATOLOGIC/LYMPHATIC NEGATIVE: 1
RESPIRATORY NEGATIVE: 1
CONSTITUTIONAL NEGATIVE: 1
ENDOCRINE NEGATIVE: 1
ALLERGIC/IMMUNOLOGIC NEGATIVE: 1
OCCASIONAL FEELINGS OF UNSTEADINESS: 1
LOSS OF SENSATION IN FEET: 1

## 2024-06-13 ASSESSMENT — COLUMBIA-SUICIDE SEVERITY RATING SCALE - C-SSRS
6. HAVE YOU EVER DONE ANYTHING, STARTED TO DO ANYTHING, OR PREPARED TO DO ANYTHING TO END YOUR LIFE?: NO
1. IN THE PAST MONTH, HAVE YOU WISHED YOU WERE DEAD OR WISHED YOU COULD GO TO SLEEP AND NOT WAKE UP?: NO
2. HAVE YOU ACTUALLY HAD ANY THOUGHTS OF KILLING YOURSELF?: NO

## 2024-06-13 NOTE — PROGRESS NOTES
"Subjective   Patient ID: Linda Roberto is a 89 y.o. female who presents for Edema (Feet and leg swelling, feeling weak x 3 weeks. Was previously on hydrochlorothiazide but was taken off due to BP being too low. ).    Past Medical, Surgical, and Family History reviewed and updated in chart.     Reviewed all medications by prescribing practitioner or clinical pharmacist (such as prescriptions, OTCs, herbal therapies and supplements) and documented in the medical record.    HPI   Patient in office with concerns of leg swelling. Was on hydrochlorothiazide but recently stopped due to kidney function. Denies shortness of breath, wheezing, chest pain or heart palpitations.   Does not elevate leg, does not wear compression stockings.   Review of Systems   Constitutional: Negative.    HENT: Negative.     Eyes: Negative.    Respiratory: Negative.     Cardiovascular:  Positive for leg swelling.   Gastrointestinal: Negative.    Endocrine: Negative.    Genitourinary: Negative.    Musculoskeletal: Negative.    Skin: Negative.    Allergic/Immunologic: Negative.    Neurological: Negative.    Hematological: Negative.    Psychiatric/Behavioral: Negative.     All other systems reviewed and are negative.      Objective   /74   Pulse 61   Ht 1.549 m (5' 0.98\")   Wt 66.2 kg (146 lb)   SpO2 94%   BMI 27.60 kg/m²     Physical Exam  Constitutional:       Appearance: Normal appearance.   HENT:      Head: Normocephalic and atraumatic.      Nose: Nose normal.      Mouth/Throat:      Mouth: Mucous membranes are moist.      Pharynx: Oropharynx is clear.   Eyes:      Pupils: Pupils are equal, round, and reactive to light.   Cardiovascular:      Rate and Rhythm: Normal rate and regular rhythm.      Pulses: Normal pulses.      Heart sounds: Normal heart sounds.   Pulmonary:      Effort: Pulmonary effort is normal.      Breath sounds: Normal breath sounds.   Abdominal:      General: Bowel sounds are normal.      Palpations: Abdomen is " soft.   Musculoskeletal:         General: Normal range of motion.      Cervical back: Normal range of motion.      Right lower leg: Edema present.      Left lower leg: Edema present.      Comments: +1 non pitting edema to ankles.    Skin:     General: Skin is warm and dry.   Neurological:      General: No focal deficit present.      Mental Status: She is alert and oriented to person, place, and time.   Psychiatric:         Mood and Affect: Mood normal.         Behavior: Behavior normal.         Thought Content: Thought content normal.         Judgment: Judgment normal.         Assessment/Plan   Problem List Items Addressed This Visit       Bilateral lower extremity edema - Primary (Chronic)

## 2024-06-13 NOTE — PATIENT INSTRUCTIONS
I recommended wearing your compression stocking daily, on in the AM and off in the PM.  At least three times daily I would like you to elevate your legs above heart level for at least 30min at a time.  When sitting in the chair have your leg elevated.     Assessment/Plan   Problem List Items Addressed This Visit       Bilateral lower extremity edema - Primary (Chronic)

## 2024-07-24 PROBLEM — Z86.69 HISTORY OF CATARACT: Status: ACTIVE | Noted: 2024-07-24

## 2024-07-24 PROBLEM — N39.0 URINARY TRACT INFECTION: Status: ACTIVE | Noted: 2024-07-24

## 2024-07-24 PROBLEM — M79.606 PAIN OF LOWER EXTREMITY: Status: ACTIVE | Noted: 2024-07-24

## 2024-07-24 PROBLEM — I73.9 CLAUDICATION OF LOWER EXTREMITY (CMS-HCC): Status: ACTIVE | Noted: 2024-07-24

## 2024-07-24 PROBLEM — M79.671 RIGHT FOOT PAIN: Status: ACTIVE | Noted: 2024-07-24

## 2024-07-24 PROBLEM — L98.9 DISORDER OF SKIN OF UPPER EXTREMITY: Status: ACTIVE | Noted: 2024-07-24

## 2024-08-05 ENCOUNTER — TELEPHONE (OUTPATIENT)
Dept: PRIMARY CARE | Facility: CLINIC | Age: 89
End: 2024-08-05
Payer: MEDICARE

## 2024-08-05 DIAGNOSIS — I10 BENIGN ESSENTIAL HYPERTENSION: ICD-10-CM

## 2024-08-05 RX ORDER — LOSARTAN POTASSIUM 100 MG/1
100 TABLET ORAL DAILY
Qty: 90 TABLET | Refills: 1 | Status: SHIPPED | OUTPATIENT
Start: 2024-08-05

## 2024-08-05 NOTE — TELEPHONE ENCOUNTER
Med Refill   losartan (Cozaar) 100 mg tablet [[id number]]     [id number] - [place], [place] - [address] [address]  Phone: [phone]  Fax: [phone]  KATHRIN #: --

## 2024-09-20 ENCOUNTER — TELEPHONE (OUTPATIENT)
Dept: CARDIOLOGY | Facility: HOSPITAL | Age: 89
End: 2024-09-20

## 2024-09-20 ENCOUNTER — APPOINTMENT (OUTPATIENT)
Dept: CARDIOLOGY | Facility: CLINIC | Age: 89
End: 2024-09-20
Payer: MEDICARE

## 2024-09-20 VITALS
BODY MASS INDEX: 26.62 KG/M2 | SYSTOLIC BLOOD PRESSURE: 150 MMHG | DIASTOLIC BLOOD PRESSURE: 90 MMHG | WEIGHT: 141 LBS | HEIGHT: 61 IN | HEART RATE: 88 BPM

## 2024-09-20 DIAGNOSIS — I10 BENIGN ESSENTIAL HYPERTENSION: Chronic | ICD-10-CM

## 2024-09-20 DIAGNOSIS — I48.0 PAROXYSMAL ATRIAL FIBRILLATION (MULTI): Primary | ICD-10-CM

## 2024-09-20 PROCEDURE — 3080F DIAST BP >= 90 MM HG: CPT | Performed by: INTERNAL MEDICINE

## 2024-09-20 PROCEDURE — 99213 OFFICE O/P EST LOW 20 MIN: CPT | Performed by: INTERNAL MEDICINE

## 2024-09-20 PROCEDURE — 93010 ELECTROCARDIOGRAM REPORT: CPT | Performed by: INTERNAL MEDICINE

## 2024-09-20 PROCEDURE — 3077F SYST BP >= 140 MM HG: CPT | Performed by: INTERNAL MEDICINE

## 2024-09-20 PROCEDURE — 1123F ACP DISCUSS/DSCN MKR DOCD: CPT | Performed by: INTERNAL MEDICINE

## 2024-09-20 PROCEDURE — 1159F MED LIST DOCD IN RCRD: CPT | Performed by: INTERNAL MEDICINE

## 2024-09-20 PROCEDURE — 93005 ELECTROCARDIOGRAM TRACING: CPT | Performed by: INTERNAL MEDICINE

## 2024-09-20 PROCEDURE — 1036F TOBACCO NON-USER: CPT | Performed by: INTERNAL MEDICINE

## 2024-09-20 RX ORDER — FUROSEMIDE 20 MG/1
20 TABLET ORAL DAILY
Qty: 90 TABLET | Refills: 3 | Status: SHIPPED | OUTPATIENT
Start: 2024-09-20 | End: 2025-09-20

## 2024-09-20 ASSESSMENT — ENCOUNTER SYMPTOMS
LOSS OF SENSATION IN FEET: 1
DEPRESSION: 1
OCCASIONAL FEELINGS OF UNSTEADINESS: 1

## 2024-09-20 NOTE — TELEPHONE ENCOUNTER
RN spoke to daughter at this time about her Eliquis. RN supplied daughter with a 30day free card and a referral to the clinic pharmacy. Pts daughter verbalized understanding at this time.        Pt daughter called in to let Dr. Woods and team know that her mother cannot afford Eliquis that she was prescribed today. Asking for a generic brand. Please call [facility]

## 2024-09-20 NOTE — PROGRESS NOTES
"Counseling:  The patient was counseled regarding diagnostic results, instructions for management, risk factor reductions, prognosis, patient and family education, impressions, risks and benefits of treatment options and importance of compliance with treatment.      Chief Complaint:   The patient presents today for annual followup of HTN and BLE edema.      History Of Present Illness:    Linda Roberto is a 89 year old female patient who presents today for annual followup of HTN and BLE edema. Her PMH is significant for HTN, hyperlipidemia, renal insufficiency, and mitral valve insufficiency. Over the past 1 yea, the patient states that she has been noting fluttering of her heart.  She does note some CP, SOB and significant LE edema,. They report that her water pill was discontinued by their PCP due to decreased kidney function.      Last Recorded Vitals:  Vitals:    09/20/24 1008   BP: 150/90   BP Location: Left arm   Pulse: 88   Weight: 64 kg (141 lb)   Height: 1.549 m (5' 1\")       Past Surgical History:  She has a past surgical history that includes Hysterectomy (04/24/2018) and Other surgical history (03/23/2020).      Social History:  She reports that she has never smoked. She has never used smokeless tobacco. She reports that she does not drink alcohol and does not use drugs.    Family History:  Family History   Problem Relation Name Age of Onset    Hypertension Mother          Allergies:  Lisinopril and Penicillins    Outpatient Medications:  Current Outpatient Medications   Medication Instructions    aspirin 81 mg, oral, Daily    calcium carb-D3-mag ox-zinc ox (Lennox Mag Zinc Plus D3) 333 mg-133 unit -133 mg-5 mg tablet 1 tablet, oral, Daily    diclofenac sodium (Voltaren) 1 % gel gel 1 Application, Topical, 4 times daily PRN    losartan (COZAAR) 100 mg, oral, Daily    metoprolol tartrate (LOPRESSOR) 50 mg, oral, 2 times daily    multivitamin-min-iron-FA-vit K (Adults Multivitamin) 18 mg iron-400 mcg-25 mcg tablet " 1 tablet, oral, Daily    polyethylene glycol (GLYCOLAX, MIRALAX) 17 g, oral, Daily     Review of Systems   All other systems reviewed and are negative.     Physical Exam:  Constitutional:       Appearance: Healthy appearance. Not in distress.   Neck:      Vascular: No JVR. JVD normal.   Pulmonary:      Effort: Pulmonary effort is normal.      Breath sounds: Normal breath sounds. No wheezing. No rhonchi. No rales.   Chest:      Chest wall: Not tender to palpatation.   Cardiovascular:      PMI at left midclavicular line. Normal rate. Irregular rhythm. Normal S1. Normal S2.       Murmurs: There is no murmur.      No gallop.  No click. No rub.   Pulses:     Intact distal pulses.   Edema:     Thigh: bilateral 2+ edema of the thigh.     Pretibial: bilateral 2+ edema of the pretibial area.     Ankle: bilateral 2+ edema of the ankle.     Feet: bilateral 2+ edema of the feet.  Abdominal:      General: Bowel sounds are normal.      Palpations: Abdomen is soft.      Tenderness: There is no abdominal tenderness.   Musculoskeletal: Normal range of motion.         General: No tenderness. Skin:     General: Skin is warm and dry.   Neurological:      General: No focal deficit present.      Mental Status: Alert and oriented to person, place and time.          Last Labs:  CBC -  Lab Results   Component Value Date    WBC 4.6 04/30/2024    HGB 12.9 04/30/2024    HCT 39.5 04/30/2024    MCV 96 04/30/2024     04/30/2024       CMP -  Lab Results   Component Value Date    CALCIUM 9.2 04/30/2024    PROT 6.7 04/30/2024    ALBUMIN 3.9 04/30/2024    AST 15 04/30/2024    ALT 7 04/30/2024    ALKPHOS 42 04/30/2024    BILITOT 0.4 04/30/2024       LIPID PANEL -   Lab Results   Component Value Date    CHOL 182 04/30/2024    TRIG 82 04/30/2024    HDL 57.6 04/30/2024    CHHDL 3.2 04/30/2024    LDLF 108 (H) 05/01/2023    VLDL 16 04/30/2024    NHDL 124 04/30/2024       RENAL FUNCTION PANEL -   Lab Results   Component Value Date    GLUCOSE 89  04/30/2024     04/30/2024    K 4.3 04/30/2024     04/30/2024    CO2 33 (H) 04/30/2024    ANIONGAP 10 04/30/2024    BUN 22 04/30/2024    CREATININE 1.47 (H) 04/30/2024    CALCIUM 9.2 04/30/2024    ALBUMIN 3.9 04/30/2024        Lab Results   Component Value Date    HGBA1C 5.8 08/17/2020       Last Cardiology Tests:  09/02/2022 - Vascular Lab PVR AMANDA Only   1. Right Lower PVR: No evidence of arterial occlusive disease in the right lower extremity at rest. Normal digital perfusion noted. Triphasic flow is noted in the right common femoral artery, right popliteal artery, right posterior tibial artery and right dorsalis pedis artery.  2. Left Lower PVR: Evidence of moderate arterial occlusive disease in the left lower extremity at rest. Decreased digital perfusion noted. Monophasic flow is noted in the left popliteal artery and left posterior tibial artery. Triphasic flow is noted in the left common femoral artery. Doppler waveforms are suggestive of a hemodynamically significant stenosis and/or occlusion that is supra popliteal artery. May wish further means of evaluation.     07/22/2022 - Vascular Lab Venous Insufficiency/Reflux U/S  1. Right Lower Venous Insufficiency: Reflux is noted in the saphenofemoral junction vein. Negative for venous reflux within the deep system.  2. Left Lower Venous Insufficiency: Reflux is noted in the saphenofemoral junction vein. Negative for venous reflux within the deep system.  3. Right Lower Venous: No evidence of acute deep vein thrombus visualized in the right lower extremity. Pulsatile venous flow throughout.  4. Left Lower Venous: No evidence of acute deep vein thrombus visualized in the left lower extremity. Pulsatile venous flow throughout.     04/20/2022 - TTE  1. The left ventricular systolic function is normal with a 60% estimated ejection fraction.  2. Mild to moderately elevated right ventricular systolic pressure.     Lab review: I have personally reviewed the  laboratory result(s).    Assessment/Plan   1) Bilateral LE Swelling, HTN  ?Due to chronic venous insufficiency and ? sleep apnea with RV dysfunction  Last echo with normal LVEF  Venous Doppler positive for reflux  AMANDA abnormal - patient denies intermittent claudication   Patient declined sleep apnea evaluation  Edema has improved since changing from amlodipine to Losartan  Compression stockings  If no response, refer to Vascular for vein stripping  9/20/2024 : I suspect this is due to CHF and Venous insufficiency  Add Lasix 20 mg daily    2. Atrial Fibrillation  Continue Metoprolol  Start Lasix 20 mg  Start Eliquis   We will get a repeat Echo.     F/u with Echo in 1 month.     Scribe Attestation  By signing my name below, IViolet Scribe   attest that this documentation has been prepared under the direction and in the presence of Sunil Woods MD.       By signing my name below, IKamila Scribe   attest that this documentation has been prepared under the direction and in the presence of Sunil Woods MD.

## 2024-09-23 ENCOUNTER — APPOINTMENT (OUTPATIENT)
Dept: CARDIOLOGY | Facility: HOSPITAL | Age: 89
End: 2024-09-23
Payer: MEDICARE

## 2024-09-27 ENCOUNTER — LAB (OUTPATIENT)
Dept: LAB | Facility: LAB | Age: 89
End: 2024-09-27
Payer: MEDICARE

## 2024-09-27 DIAGNOSIS — I48.0 PAROXYSMAL ATRIAL FIBRILLATION (MULTI): ICD-10-CM

## 2024-09-27 DIAGNOSIS — I10 BENIGN ESSENTIAL HYPERTENSION: Chronic | ICD-10-CM

## 2024-09-27 LAB
ANION GAP SERPL CALC-SCNC: 12 MMOL/L (ref 10–20)
BUN SERPL-MCNC: 14 MG/DL (ref 6–23)
CALCIUM SERPL-MCNC: 8.9 MG/DL (ref 8.6–10.3)
CHLORIDE SERPL-SCNC: 93 MMOL/L (ref 98–107)
CO2 SERPL-SCNC: 38 MMOL/L (ref 21–32)
CREAT SERPL-MCNC: 1.36 MG/DL (ref 0.5–1.05)
EGFRCR SERPLBLD CKD-EPI 2021: 37 ML/MIN/1.73M*2
GLUCOSE SERPL-MCNC: 86 MG/DL (ref 74–99)
POTASSIUM SERPL-SCNC: 3.2 MMOL/L (ref 3.5–5.3)
SODIUM SERPL-SCNC: 140 MMOL/L (ref 136–145)

## 2024-09-27 PROCEDURE — 80048 BASIC METABOLIC PNL TOTAL CA: CPT

## 2024-09-27 PROCEDURE — 36415 COLL VENOUS BLD VENIPUNCTURE: CPT

## 2024-10-02 ENCOUNTER — TELEPHONE (OUTPATIENT)
Dept: CARDIOLOGY | Facility: HOSPITAL | Age: 89
End: 2024-10-02
Payer: MEDICARE

## 2024-10-03 ENCOUNTER — TELEPHONE (OUTPATIENT)
Dept: CARDIOLOGY | Facility: HOSPITAL | Age: 89
End: 2024-10-03
Payer: MEDICARE

## 2024-10-03 DIAGNOSIS — E87.6 HYPOKALEMIA: Primary | ICD-10-CM

## 2024-10-03 NOTE — TELEPHONE ENCOUNTER
RN called daughter back at this time. Pt is refusing her echo at this time due her age. RN notified MD at this time.

## 2024-10-03 NOTE — TELEPHONE ENCOUNTER
RN called with results and plan. RN placed orders. Pts daughter verbalized understanding.        ----- Message from Sunil Woods sent at 9/28/2024 11:59 AM EDT -----  Start K-dur 10 meq daily and repeat BMP in 2 weeks

## 2024-10-04 RX ORDER — POTASSIUM CHLORIDE 750 MG/1
10 TABLET, FILM COATED, EXTENDED RELEASE ORAL DAILY
Qty: 30 TABLET | Refills: 0 | Status: SHIPPED | OUTPATIENT
Start: 2024-10-04 | End: 2024-11-03

## 2024-10-07 ENCOUNTER — APPOINTMENT (OUTPATIENT)
Dept: PHARMACY | Facility: HOSPITAL | Age: 89
End: 2024-10-07
Payer: MEDICARE

## 2024-10-07 DIAGNOSIS — I48.0 PAROXYSMAL ATRIAL FIBRILLATION (MULTI): ICD-10-CM

## 2024-10-07 NOTE — PROGRESS NOTES
Pharmacist Clinic: Cardiology Management    Linda Roberto is a 89 y.o. female was referred to Clinical Pharmacy Team for Anticoagulation management.     Referring Provider: Sunil Woods MD    THIS IS A NEW PATIENT APPOINTMENT. PATIENT WILL BE ESTABLISHING CARE WITH CLINICAL PHARMACY.    Appointment was completed by Maci who was reached at primary number.  Allergies Reviewed? Yes    Allergies   Allergen Reactions    Lisinopril Cough    Penicillins Other       Past Medical History:   Diagnosis Date    Body mass index (BMI)30.0-30.9, adult 03/22/2021    Body mass index (BMI) of 30.0 to 30.9 in adult    Personal history of other diseases of the musculoskeletal system and connective tissue     History of osteoporosis    Personal history of other diseases of the nervous system and sense organs     History of cataract    Ventricular premature depolarization 03/23/2020    PVC (premature ventricular contraction)       Current Outpatient Medications on File Prior to Visit   Medication Sig Dispense Refill    apixaban (Eliquis) 2.5 mg tablet Take 1 tablet (2.5 mg) by mouth 2 times a day. 180 tablet 3    aspirin 81 mg EC tablet Take 1 tablet (81 mg) by mouth once daily.      calcium carb-D3-mag ox-zinc ox (Lennox Mag Zinc Plus D3) 333 mg-133 unit -133 mg-5 mg tablet Take 1 tablet by mouth once daily.      diclofenac sodium (Voltaren) 1 % gel gel Apply 1 Application topically 4 times a day as needed (pain). (Patient not taking: Reported on 9/20/2024) 450 g 3    furosemide (Lasix) 20 mg tablet Take 1 tablet (20 mg) by mouth once daily. 90 tablet 3    losartan (Cozaar) 100 mg tablet Take 1 tablet (100 mg) by mouth once daily. 90 tablet 1    metoprolol tartrate (Lopressor) 50 mg tablet Take 1 tablet by mouth 2 times a day. 180 tablet 3    multivitamin-min-iron-FA-vit K (Adults Multivitamin) 18 mg iron-400 mcg-25 mcg tablet Take 1 tablet by mouth once daily.      polyethylene glycol (Glycolax) 17 gram/dose powder Take 17 g by mouth  "once daily.      potassium chloride CR (Klor-Con) 10 mEq ER tablet Take 1 tablet (10 mEq) by mouth once daily. Do not crush, chew, or split. 30 tablet 0     No current facility-administered medications on file prior to visit.         RELEVANT LAB RESULTS  Lab Results   Component Value Date    BILITOT 0.4 04/30/2024    CALCIUM 8.9 09/27/2024    CO2 38 (H) 09/27/2024    CL 93 (L) 09/27/2024    CREATININE 1.36 (H) 09/27/2024    GLUCOSE 86 09/27/2024    ALKPHOS 42 04/30/2024    K 3.2 (L) 09/27/2024    PROT 6.7 04/30/2024     09/27/2024    AST 15 04/30/2024    ALT 7 04/30/2024    BUN 14 09/27/2024    ANIONGAP 12 09/27/2024    ALBUMIN 3.9 04/30/2024    GFRF 49 (A) 05/01/2023     Lab Results   Component Value Date    TRIG 82 04/30/2024    CHOL 182 04/30/2024    LDLCALC 108 (H) 04/30/2024    HDL 57.6 04/30/2024     No results found for: \"BMCBC\", \"CBCDIF\"     PHARMACEUTICAL ASSESSMENT    MEDICATION RECONCILIATION    Home Pharmacy Reviewed? Yes, describe: [facility] [place]    No changes were made to home medication list    Drug Interactions? No    Medication Documentation Review Audit       Reviewed by Olive De Dios MA (Medical Assistant) on 09/20/24 at 1008      Medication Order Taking? Sig Documenting Provider Last Dose Status   aspirin 81 mg EC tablet [id number] No Take 1 tablet (81 mg) by mouth once daily. Historical Provider, MD Not Taking Active   calcium carb-D3-mag ox-zinc ox (Lennox Mag Zinc Plus D3) 333 mg-133 unit -133 mg-5 mg tablet [id number] No Take 1 tablet by mouth once daily. Historical Provider, MD Not Taking Active   diclofenac sodium (Voltaren) 1 % gel gel [id number] No Apply 1 Application topically 4 times a day as needed (pain).   Patient not taking: Reported on 9/20/2024    Laurie Avina, DO Not Taking Active   losartan (Cozaar) 100 mg tablet [id number] Yes Take 1 tablet (100 mg) by mouth once daily. Laurie Avina, DO Taking Active   metoprolol tartrate (Lopressor) 50 mg tablet [id number] Yes " Take 1 tablet by mouth 2 times a day. Laurie Avina, DO Taking Active   multivitamin-min-iron-FA-vit K (Adults Multivitamin) 18 mg iron-400 mcg-25 mcg tablet [id number] No Take 1 tablet by mouth once daily. Historical Provider, MD Not Taking Active   polyethylene glycol (Glycolax) 17 gram/dose powder [id number] No Take 17 g by mouth once daily. Historical Provider, MD Not Taking Active                    DISEASE MANAGEMENT ASSESSMENT:     ANTICOAGULATION ASSESSMENT    The ASCVD Risk score (Charla TOVAR, et al., 2019) failed to calculate for the following reasons:    The 2019 ASCVD risk score is only valid for ages 40 to 79    DIAGNOSIS: prevention of nonvalvular atrial fibrilliation stroke and systemic embolism  - Patient is projected to be on anticoagulation indefinitley  - [id number] Score: [4] (only included if diagnosis is atrial fibrillation)   Age: [<65 (0)] [65-74 (+1)] [> 75 (+2)]: 2  Sex: [Male/Female (+1)]: 1  CHF history: [No/Yes(+1)]: 0  Hypertension history: [No/Yes(+1)]: 1  Stroke/TIA/thromboembolism history: [No/Yes(+2)]: 0  Vascular disease history (prior MI, peripheral artery disease, aortic plaque): [No/Yes(+1)]: 0  Diabetes history: [No/Yes(+1)]: 0    CURRENT PHARMACOTHERAPY:    Eliquis 2.5mg twice daily which is not appropriate for a patient whose Scr is 1.36mg/dl, 89 yoa, and weighs 64kg.    RELEVANT PAST MEDICAL HISTORY:   Afib, HTN, HLD, CKD    Affordability/Accessibility: patient does not have prescription insurance  Adherence/Organization: reports adherence with 30 day free trial voucher  Adverse Reactions: none reported  Recent Hospitalizations: none reported  Recent Falls/Trauma: none reported  Changes in Tobacco or Alcohol Intake:   Tobacco: does not use  Alcohol: does not use    EDUCATION/COUNSELING:   - Counseled patient on MOA, expectations, duration of therapy, contraindications, administration, and monitoring parameters  - Counseled patient of side effects that are indicative of bleeding  such as dark tarry stool, unexplainable bruising, or vomiting up a coffee ground like substance      DISCUSSION/NOTES:   Linda currently is not able to afford DOAC therapy. Discussed getting set up with manufacture assistance with her daughter. Application was emailed to her daughter Maci.  If she does not qualify for assistance through the  she may qualify through  PAP if she gets a part D plan. Educated Maci about open enrollment if needed.    ASSESSMENT AND PLAN:    Assessment/Plan   Problem List Items Addressed This Visit    None  Visit Diagnoses       Paroxysmal atrial fibrillation (Multi)            Inappropriate Eliquis dose based on their age weight and kidney function  89 yoa  64kg  Scr 1.36mg/dl      RECOMMENDATIONS/PLAN    Increase  Eliquis 5mg BID  Prescription will need to be sent to  assistance program    Last Appnt with Referring Provider: 9/20/24  Next Appnt with Referring Provider: 11/1/24  Clinical Pharmacist follow up: 11/4/24  VAF/Application Expiration: No  Type of Encounter: Lobito Garcia PharmD    Verbal consent to manage patient's drug therapy was obtained from an individual authorized to act on behalf of a patient. They were informed they may decline to participate or withdraw from participation in pharmacy services at any time.    Continue all meds under the continuation of care with the referring provider and clinical pharmacy team.

## 2024-10-07 NOTE — Clinical Note
Linda does not qualify for  PAP because she does not have prescription insurance. She was given  assistance application. Recommend increasing Eliquis dose to 5mg BID because she does not meet the qualifications for the 2.5mg dosing

## 2024-10-19 ENCOUNTER — LAB (OUTPATIENT)
Dept: LAB | Facility: LAB | Age: 89
End: 2024-10-19
Payer: MEDICARE

## 2024-10-19 DIAGNOSIS — E87.6 HYPOKALEMIA: ICD-10-CM

## 2024-10-19 DIAGNOSIS — N18.32 STAGE 3B CHRONIC KIDNEY DISEASE (MULTI): ICD-10-CM

## 2024-10-19 LAB
ALBUMIN SERPL BCP-MCNC: 4 G/DL (ref 3.4–5)
ALP SERPL-CCNC: 36 U/L (ref 33–136)
ALT SERPL W P-5'-P-CCNC: 12 U/L (ref 7–45)
ANION GAP SERPL CALC-SCNC: 14 MMOL/L (ref 10–20)
AST SERPL W P-5'-P-CCNC: 16 U/L (ref 9–39)
BILIRUB SERPL-MCNC: 0.6 MG/DL (ref 0–1.2)
BUN SERPL-MCNC: 33 MG/DL (ref 6–23)
CALCIUM SERPL-MCNC: 9.3 MG/DL (ref 8.6–10.3)
CHLORIDE SERPL-SCNC: 99 MMOL/L (ref 98–107)
CO2 SERPL-SCNC: 31 MMOL/L (ref 21–32)
CREAT SERPL-MCNC: 2 MG/DL (ref 0.5–1.05)
EGFRCR SERPLBLD CKD-EPI 2021: 23 ML/MIN/1.73M*2
GLUCOSE SERPL-MCNC: 80 MG/DL (ref 74–99)
POTASSIUM SERPL-SCNC: 4.1 MMOL/L (ref 3.5–5.3)
PROT SERPL-MCNC: 6.9 G/DL (ref 6.4–8.2)
SODIUM SERPL-SCNC: 140 MMOL/L (ref 136–145)

## 2024-10-19 PROCEDURE — 36415 COLL VENOUS BLD VENIPUNCTURE: CPT

## 2024-10-19 PROCEDURE — 80053 COMPREHEN METABOLIC PANEL: CPT

## 2024-10-21 ENCOUNTER — TELEPHONE (OUTPATIENT)
Dept: CARDIOLOGY | Facility: HOSPITAL | Age: 89
End: 2024-10-21
Payer: MEDICARE

## 2024-10-21 ENCOUNTER — TELEPHONE (OUTPATIENT)
Dept: PRIMARY CARE | Facility: CLINIC | Age: 89
End: 2024-10-21
Payer: MEDICARE

## 2024-10-21 DIAGNOSIS — I48.91 ATRIAL FIBRILLATION, UNSPECIFIED TYPE (MULTI): Primary | ICD-10-CM

## 2024-10-21 DIAGNOSIS — N18.32 STAGE 3B CHRONIC KIDNEY DISEASE (MULTI): Primary | ICD-10-CM

## 2024-10-21 NOTE — TELEPHONE ENCOUNTER
Talked to Maci and she said patient refused any other medical tx. Maci said is 89 and she is done.  Maci is worried that she is not eating a whole lot and not drinking much.  She's saying that she's full faster.  Her intake is probably under 30 ounces of fluids a day and that's coffee and water.  Maci has been checking her BP morning and night as well.

## 2024-10-21 NOTE — TELEPHONE ENCOUNTER
Agree with coupon. May also qualify for xarelto. Pharmacy referral may also be appropriate I can try to place.

## 2024-10-21 NOTE — TELEPHONE ENCOUNTER
----- Message from Sunil Woods sent at 10/21/2024  7:26 AM EDT -----  Have her hold lasix, potassium and Losartan for 3 days and repeat labs - Her BUN/Creatinine has sig increased

## 2024-10-21 NOTE — TELEPHONE ENCOUNTER
Maci has her in compression socks for swelling and tries to encourage drinking.  She said she is comfortable taking care of Linda and doesn't think she needs hospice but will discuss at her next visit. Im going to see if we have a coupon for her eliquis as well.

## 2024-10-21 NOTE — TELEPHONE ENCOUNTER
Rn called daugher-in-law at this time regarding results and plan, RN instructed pt to hold lasix, potassium, and losartan for 3 days and get lab work. Order is in. Pts daughter verbalized understanding at this time.

## 2024-10-21 NOTE — TELEPHONE ENCOUNTER
Has an upcoming appt. Maybe it is time to discuss hospice vs what measures she is comfortable continuing. If her fluid intake continues to dwindle she may need IV fluids and I would recommend the ED but not drinking can definitely impact that kidney number. But again it depends on what measures they are most comfortable partaking in.

## 2024-10-21 NOTE — TELEPHONE ENCOUNTER
----- Message from Laurie Avnia sent at 10/21/2024  7:51 AM EDT -----  Patient's kidneys have showed progressed decreased function which is concerning to me. She is now in stage 4, at this stage is where we need to get nephrology on board for management, referral is in place.

## 2024-10-21 NOTE — TELEPHONE ENCOUNTER
RN called pts family at this time in regards to Elitracy, RN called [employer] and was able to get information for that family to get her enrolled to a loyalty program. Pt family verbalized understanding at this time.

## 2024-10-22 ENCOUNTER — TELEPHONE (OUTPATIENT)
Dept: PRIMARY CARE | Facility: CLINIC | Age: 89
End: 2024-10-22
Payer: MEDICARE

## 2024-10-22 ENCOUNTER — TELEPHONE (OUTPATIENT)
Dept: CARDIOLOGY | Facility: HOSPITAL | Age: 89
End: 2024-10-22
Payer: MEDICARE

## 2024-10-22 NOTE — TELEPHONE ENCOUNTER
RN was able to get patient approved for Free Eliquis at this time. RN notified daughter and medication will be mailed to her.

## 2024-10-22 NOTE — TELEPHONE ENCOUNTER
Maci's BP was perfect this morning and she did have 18 oz of water.  Talked to her about the Eliquis coupon and pharmacy referral.  Cardio is calling [employer] to see if they can help as well.  Took her off of potassium, lasix and losartan for 4 days and she will have more labs on Saturday.

## 2024-10-23 PROBLEM — I70.203 ATHEROSCLEROSIS OF NATIVE ARTERY OF BOTH LOWER EXTREMITIES (CMS-HCC): Status: ACTIVE | Noted: 2024-10-11

## 2024-10-23 PROBLEM — B35.1 ONYCHOMYCOSIS: Status: ACTIVE | Noted: 2024-10-11

## 2024-10-23 NOTE — TELEPHONE ENCOUNTER
Maci is asking if she could speak to you regarding her mother   
Noted, thanks!  
They finally got a hold of Eliquis and they are going to give her the medication free of charge.  Maci just wanted to give us an update.  
Yes

## 2024-10-25 ENCOUNTER — TELEPHONE (OUTPATIENT)
Dept: CARDIOLOGY | Facility: HOSPITAL | Age: 89
End: 2024-10-25
Payer: MEDICARE

## 2024-10-26 ENCOUNTER — LAB (OUTPATIENT)
Dept: LAB | Facility: LAB | Age: 89
End: 2024-10-26
Payer: MEDICARE

## 2024-10-26 DIAGNOSIS — N18.32 STAGE 3B CHRONIC KIDNEY DISEASE (MULTI): ICD-10-CM

## 2024-10-26 LAB
ALBUMIN SERPL BCP-MCNC: 4.1 G/DL (ref 3.4–5)
ALP SERPL-CCNC: 41 U/L (ref 33–136)
ALT SERPL W P-5'-P-CCNC: 12 U/L (ref 7–45)
ANION GAP SERPL CALC-SCNC: 13 MMOL/L (ref 10–20)
AST SERPL W P-5'-P-CCNC: 18 U/L (ref 9–39)
BILIRUB SERPL-MCNC: 0.6 MG/DL (ref 0–1.2)
BUN SERPL-MCNC: 22 MG/DL (ref 6–23)
CALCIUM SERPL-MCNC: 9.4 MG/DL (ref 8.6–10.3)
CHLORIDE SERPL-SCNC: 101 MMOL/L (ref 98–107)
CO2 SERPL-SCNC: 30 MMOL/L (ref 21–32)
CREAT SERPL-MCNC: 1.46 MG/DL (ref 0.5–1.05)
EGFRCR SERPLBLD CKD-EPI 2021: 34 ML/MIN/1.73M*2
GLUCOSE SERPL-MCNC: 80 MG/DL (ref 74–99)
POTASSIUM SERPL-SCNC: 4.1 MMOL/L (ref 3.5–5.3)
PROT SERPL-MCNC: 6.9 G/DL (ref 6.4–8.2)
SODIUM SERPL-SCNC: 140 MMOL/L (ref 136–145)

## 2024-10-26 PROCEDURE — 80053 COMPREHEN METABOLIC PANEL: CPT

## 2024-10-26 PROCEDURE — 36415 COLL VENOUS BLD VENIPUNCTURE: CPT

## 2024-10-28 ENCOUNTER — TELEPHONE (OUTPATIENT)
Dept: CARDIOLOGY | Facility: HOSPITAL | Age: 89
End: 2024-10-28
Payer: MEDICARE

## 2024-10-28 DIAGNOSIS — N18.32 STAGE 3B CHRONIC KIDNEY DISEASE (MULTI): Primary | ICD-10-CM

## 2024-10-28 DIAGNOSIS — I10 BENIGN ESSENTIAL HYPERTENSION: Chronic | ICD-10-CM

## 2024-11-01 ENCOUNTER — APPOINTMENT (OUTPATIENT)
Dept: CARDIOLOGY | Facility: HOSPITAL | Age: 89
End: 2024-11-01
Payer: MEDICARE

## 2024-11-01 ENCOUNTER — TELEPHONE (OUTPATIENT)
Dept: PRIMARY CARE | Facility: CLINIC | Age: 89
End: 2024-11-01
Payer: MEDICARE

## 2024-11-01 PROBLEM — N18.32 STAGE 3B CHRONIC KIDNEY DISEASE (MULTI): Chronic | Status: ACTIVE | Noted: 2024-05-02

## 2024-11-01 NOTE — PROGRESS NOTES
"Patient Name:  Linda Roberto  MRN:  [id number]  :  1932    Subjective   Patient ID: Linda Roberto is a 89 y.o. female who presents for Follow-up (Pt here for follow up, has lost 19 lbs since  and not trying to).    HPI     Improved GFR since patient started working on hydration, apparently was not drinking at all   Linda is at the point in her life she wants minimal interventions  Creatinine  0.50 - 1.05 mg/dL 1.46 High  2.00 High  1.36 High  1.47 High  1.07 High  1.07 High  1.07 High    eGFR  >60 mL/min/1.73m*2 34 Low  23 Low  C          Linda is continuing to lose weight however   Linda denies depression  but reports she does not want any further medical interventions and is ready to be at the end of life.    Breakfast- toast and tomato, maybe a banana and orange juice   No dinner or lung typically     Sometimes a sandwich or if family brings dinner she will eat for several days     Brought in log, some lower Bps at night even off losartan   Is still on lasix from cardiology   Lowest on log is 78/42    Review of Systems   Constitutional:  Negative for fatigue and fever.   Respiratory:  Negative for shortness of breath.    Cardiovascular:  Negative for chest pain.   Allergic/Immunologic: Negative for immunocompromised state.   Psychiatric/Behavioral:  Negative for confusion and decreased concentration.      Objective   /82 (BP Location: Left arm, Patient Position: Sitting)   Pulse 70   Ht 1.549 m (5' 1\")   Wt 57.6 kg (127 lb)   SpO2 97%   BMI 24.00 kg/m²     Physical Exam  Constitutional:       Appearance: Normal appearance.   HENT:      Head: Atraumatic.   Cardiovascular:      Rate and Rhythm: Normal rate.   Pulmonary:      Effort: Pulmonary effort is normal.   Skin:     Coloration: Skin is not jaundiced or pale.   Neurological:      Mental Status: She is alert and oriented to person, place, and time.   Psychiatric:         Mood and Affect: Mood normal.         Behavior: Behavior normal. "     Assessment/Plan   Problem List Items Addressed This Visit             ICD-10-CM    Stage 3b chronic kidney disease (Multi) - Primary (Chronic) N18.32     GFR trend--> 37, 23, 34, improved from prior with work on hydration, needs to drink daily   Urine Albumin in the last year- Due   BP-goal < 130/80   On ACE or ARB: losartan 100mg   DM II- goal of <7%- due for A1c    On SLGT2:   Avoid nephrotoxic agents/Adjusting nephrotoxic agents- off hydrochlorothiazide since May 2024, does take low dose lasix per cards  Recommendation healthy physcial activity and heart healthy diet  Hydration with non sugar added or diuretic liquids           Relevant Orders    Follow Up In [facility] - PCP - Established    Basic metabolic panel     Other Visit Diagnoses         Codes    Weight loss     R63.4    Relevant Orders    Follow Up In [facility] - PCP - Established    Hypotension, unspecified hypotension type     I95.9          Discussion of prioritizing protein intake, and trying to eat more than one meal a day   Family will bring some dinners, will add eggs to breakfast   She would like to hold on before nutrition     Discussion of how lasix can impact that BP reads and kidney function, cardiology restarted   Since last cardiology visit 141-127  I am concerned about some of her evening blood pressures  Will do every other day water pill and keep up home log to try to elevate while giving her some cardiac benefit, find a middle road    Will recheck BMP at that time as well

## 2024-11-01 NOTE — ASSESSMENT & PLAN NOTE
GFR trend--> 37, 23, 34, improved from prior with work on hydration, needs to drink daily   Urine Albumin in the last year- Due   BP-goal < 130/80   On ACE or ARB: losartan 100mg   DM II- goal of <7%- due for A1c    On SLGT2:   Avoid nephrotoxic agents/Adjusting nephrotoxic agents- off hydrochlorothiazide since May 2024, does take low dose lasix per cards  Recommendation healthy physcial activity and heart healthy diet  Hydration with non sugar added or diuretic liquids

## 2024-11-04 ENCOUNTER — APPOINTMENT (OUTPATIENT)
Dept: PRIMARY CARE | Facility: CLINIC | Age: 89
End: 2024-11-04
Payer: MEDICARE

## 2024-11-04 VITALS
SYSTOLIC BLOOD PRESSURE: 120 MMHG | DIASTOLIC BLOOD PRESSURE: 82 MMHG | HEIGHT: 61 IN | BODY MASS INDEX: 23.98 KG/M2 | WEIGHT: 127 LBS | OXYGEN SATURATION: 97 % | HEART RATE: 70 BPM

## 2024-11-04 DIAGNOSIS — N18.32 STAGE 3B CHRONIC KIDNEY DISEASE (MULTI): Primary | Chronic | ICD-10-CM

## 2024-11-04 DIAGNOSIS — R63.4 WEIGHT LOSS: ICD-10-CM

## 2024-11-04 DIAGNOSIS — I95.9 HYPOTENSION, UNSPECIFIED HYPOTENSION TYPE: ICD-10-CM

## 2024-11-04 PROCEDURE — 1160F RVW MEDS BY RX/DR IN RCRD: CPT | Performed by: STUDENT IN AN ORGANIZED HEALTH CARE EDUCATION/TRAINING PROGRAM

## 2024-11-04 PROCEDURE — 3079F DIAST BP 80-89 MM HG: CPT | Performed by: STUDENT IN AN ORGANIZED HEALTH CARE EDUCATION/TRAINING PROGRAM

## 2024-11-04 PROCEDURE — 1159F MED LIST DOCD IN RCRD: CPT | Performed by: STUDENT IN AN ORGANIZED HEALTH CARE EDUCATION/TRAINING PROGRAM

## 2024-11-04 PROCEDURE — 1036F TOBACCO NON-USER: CPT | Performed by: STUDENT IN AN ORGANIZED HEALTH CARE EDUCATION/TRAINING PROGRAM

## 2024-11-04 PROCEDURE — 99214 OFFICE O/P EST MOD 30 MIN: CPT | Performed by: STUDENT IN AN ORGANIZED HEALTH CARE EDUCATION/TRAINING PROGRAM

## 2024-11-04 PROCEDURE — 3074F SYST BP LT 130 MM HG: CPT | Performed by: STUDENT IN AN ORGANIZED HEALTH CARE EDUCATION/TRAINING PROGRAM

## 2024-11-04 PROCEDURE — 1123F ACP DISCUSS/DSCN MKR DOCD: CPT | Performed by: STUDENT IN AN ORGANIZED HEALTH CARE EDUCATION/TRAINING PROGRAM

## 2024-11-04 ASSESSMENT — ENCOUNTER SYMPTOMS
LOSS OF SENSATION IN FEET: 0
DEPRESSION: 0
FEVER: 0
OCCASIONAL FEELINGS OF UNSTEADINESS: 0
SHORTNESS OF BREATH: 0
FATIGUE: 0
DECREASED CONCENTRATION: 0
CONFUSION: 0

## 2024-11-04 ASSESSMENT — PATIENT HEALTH QUESTIONNAIRE - PHQ9
2. FEELING DOWN, DEPRESSED OR HOPELESS: NOT AT ALL
1. LITTLE INTEREST OR PLEASURE IN DOING THINGS: NOT AT ALL
SUM OF ALL RESPONSES TO PHQ9 QUESTIONS 1 AND 2: 0

## 2024-11-07 ENCOUNTER — APPOINTMENT (OUTPATIENT)
Dept: PHARMACY | Facility: HOSPITAL | Age: 89
End: 2024-11-07
Payer: MEDICARE

## 2024-11-15 ENCOUNTER — LAB (OUTPATIENT)
Dept: LAB | Facility: LAB | Age: 89
End: 2024-11-15
Payer: MEDICARE

## 2024-11-15 ENCOUNTER — TELEPHONE (OUTPATIENT)
Dept: CARDIOLOGY | Facility: HOSPITAL | Age: 89
End: 2024-11-15

## 2024-11-15 ENCOUNTER — TELEPHONE (OUTPATIENT)
Dept: PRIMARY CARE | Facility: CLINIC | Age: 89
End: 2024-11-15

## 2024-11-15 DIAGNOSIS — N18.32 STAGE 3B CHRONIC KIDNEY DISEASE (MULTI): ICD-10-CM

## 2024-11-15 DIAGNOSIS — I10 BENIGN ESSENTIAL HYPERTENSION: Chronic | ICD-10-CM

## 2024-11-15 LAB
ANION GAP SERPL CALC-SCNC: 12 MMOL/L (ref 10–20)
BUN SERPL-MCNC: 15 MG/DL (ref 6–23)
CALCIUM SERPL-MCNC: 9.1 MG/DL (ref 8.6–10.3)
CHLORIDE SERPL-SCNC: 99 MMOL/L (ref 98–107)
CO2 SERPL-SCNC: 33 MMOL/L (ref 21–32)
CREAT SERPL-MCNC: 1.22 MG/DL (ref 0.5–1.05)
EGFRCR SERPLBLD CKD-EPI 2021: 42 ML/MIN/1.73M*2
GLUCOSE SERPL-MCNC: 80 MG/DL (ref 74–99)
POTASSIUM SERPL-SCNC: 4 MMOL/L (ref 3.5–5.3)
SODIUM SERPL-SCNC: 140 MMOL/L (ref 136–145)

## 2024-11-15 PROCEDURE — 80048 BASIC METABOLIC PNL TOTAL CA: CPT

## 2024-11-15 PROCEDURE — 36415 COLL VENOUS BLD VENIPUNCTURE: CPT

## 2024-11-15 NOTE — TELEPHONE ENCOUNTER
RN called pt at this time regarding results and plan, no answer at this time. RN left message for patient.           ----- Message from Sunil Woods sent at 11/15/2024  1:13 PM EST -----  Let patient know that kidney function and electrolytes are ok

## 2024-11-15 NOTE — TELEPHONE ENCOUNTER
Maci saw me in the [place] and stopped me to tell me that Linda is hallucinating at night and asked if I could send you a note because she was going to call later anyway.  Please advise.

## 2024-11-15 NOTE — TELEPHONE ENCOUNTER
She said that she sleeps all day and is going to the bathroom a lot but Maci has made her drink more water.  When she is talking to people her eyes are closed but she is awake.  She had a blood culture drawn today so Maci wants to wait to see what it is before doing any other testing.  Will call if she gets worse because Linda will refuse the ED.

## 2024-11-15 NOTE — TELEPHONE ENCOUNTER
We do not have any openings, but can she do a nurse visit today while I am here? We can at least run a UA and culture to make sure this is not a UTI. It is a difficult situation, Linda is declining any specialist referral like geriatrics but it does seem like her condition is progressing. If worsening recommend the ED.

## 2024-11-25 ENCOUNTER — TELEPHONE (OUTPATIENT)
Dept: PRIMARY CARE | Facility: CLINIC | Age: 89
End: 2024-11-25
Payer: MEDICARE

## 2024-11-25 DIAGNOSIS — R44.3 HALLUCINATIONS: Primary | ICD-10-CM

## 2024-11-25 DIAGNOSIS — R41.82 ALTERED MENTAL STATUS, UNSPECIFIED ALTERED MENTAL STATUS TYPE: ICD-10-CM

## 2024-11-25 NOTE — TELEPHONE ENCOUNTER
Patients daughter would like  to know if you can prescribe patient a sleeping medication. She is not sleeping and has now started to hallucinate.

## 2024-11-25 NOTE — TELEPHONE ENCOUNTER
Hallucinations are concerning and I am not sure what medication would be a safe option without an eval and workup. I know she is hesitant to take her to the ED but that would be my recommendation with this change in status, we can do outpatient labs to start working up but the process is not as expedited as I would like.

## 2024-11-25 NOTE — TELEPHONE ENCOUNTER
Notified daughter, she said her mom refuses to go to the ED. They also said that patient had blood work done  last week. They said they will wait until next appointment on 12/20

## 2024-12-07 ENCOUNTER — APPOINTMENT (OUTPATIENT)
Dept: CARDIOLOGY | Facility: HOSPITAL | Age: 89
DRG: 189 | End: 2024-12-07
Payer: MEDICARE

## 2024-12-07 ENCOUNTER — APPOINTMENT (OUTPATIENT)
Dept: RADIOLOGY | Facility: HOSPITAL | Age: 89
DRG: 189 | End: 2024-12-07
Payer: MEDICARE

## 2024-12-07 ENCOUNTER — HOSPITAL ENCOUNTER (INPATIENT)
Facility: HOSPITAL | Age: 89
End: 2024-12-07
Attending: STUDENT IN AN ORGANIZED HEALTH CARE EDUCATION/TRAINING PROGRAM | Admitting: INTERNAL MEDICINE
Payer: MEDICARE

## 2024-12-07 DIAGNOSIS — J96.01 ACUTE RESPIRATORY FAILURE WITH HYPOXIA (MULTI): Primary | ICD-10-CM

## 2024-12-07 DIAGNOSIS — R65.10 SIRS (SYSTEMIC INFLAMMATORY RESPONSE SYNDROME) (MULTI): ICD-10-CM

## 2024-12-07 DIAGNOSIS — J81.0 ACUTE PULMONARY EDEMA: ICD-10-CM

## 2024-12-07 DIAGNOSIS — I50.9 ACUTE ON CHRONIC CONGESTIVE HEART FAILURE, UNSPECIFIED HEART FAILURE TYPE: ICD-10-CM

## 2024-12-07 DIAGNOSIS — R51.9 NONINTRACTABLE HEADACHE, UNSPECIFIED CHRONICITY PATTERN, UNSPECIFIED HEADACHE TYPE: ICD-10-CM

## 2024-12-07 LAB
ALBUMIN SERPL BCP-MCNC: 4.1 G/DL (ref 3.4–5)
ALP SERPL-CCNC: 51 U/L (ref 33–136)
ALT SERPL W P-5'-P-CCNC: 11 U/L (ref 7–45)
ANION GAP SERPL CALC-SCNC: 15 MMOL/L (ref 10–20)
AORTIC VALVE MEAN GRADIENT: 1 MMHG
AORTIC VALVE PEAK VELOCITY: 0.81 M/S
AST SERPL W P-5'-P-CCNC: 19 U/L (ref 9–39)
AV PEAK GRADIENT: 3 MMHG
AVA (PEAK VEL): 1.67 CM2
AVA (VTI): 1.62 CM2
BASOPHILS # BLD AUTO: 0.04 X10*3/UL (ref 0–0.1)
BASOPHILS NFR BLD AUTO: 0.3 %
BILIRUB DIRECT SERPL-MCNC: 0.3 MG/DL (ref 0–0.3)
BILIRUB SERPL-MCNC: 1.2 MG/DL (ref 0–1.2)
BNP SERPL-MCNC: 785 PG/ML (ref 0–99)
BUN SERPL-MCNC: 17 MG/DL (ref 6–23)
CALCIUM SERPL-MCNC: 9.4 MG/DL (ref 8.6–10.3)
CARDIAC TROPONIN I PNL SERPL HS: 18 NG/L (ref 0–13)
CARDIAC TROPONIN I PNL SERPL HS: 18 NG/L (ref 0–13)
CHLORIDE SERPL-SCNC: 92 MMOL/L (ref 98–107)
CO2 SERPL-SCNC: 30 MMOL/L (ref 21–32)
CREAT SERPL-MCNC: 1.25 MG/DL (ref 0.5–1.05)
EGFRCR SERPLBLD CKD-EPI 2021: 41 ML/MIN/1.73M*2
EJECTION FRACTION APICAL 4 CHAMBER: 69.5
EJECTION FRACTION: 70 %
EOSINOPHIL # BLD AUTO: 0.01 X10*3/UL (ref 0–0.4)
EOSINOPHIL NFR BLD AUTO: 0.1 %
ERYTHROCYTE [DISTWIDTH] IN BLOOD BY AUTOMATED COUNT: 14.8 % (ref 11.5–14.5)
FLUAV RNA RESP QL NAA+PROBE: NOT DETECTED
FLUBV RNA RESP QL NAA+PROBE: NOT DETECTED
GLUCOSE SERPL-MCNC: 107 MG/DL (ref 74–99)
HCT VFR BLD AUTO: 39.4 % (ref 36–46)
HGB BLD-MCNC: 13.1 G/DL (ref 12–16)
IMM GRANULOCYTES # BLD AUTO: 0.1 X10*3/UL (ref 0–0.5)
IMM GRANULOCYTES NFR BLD AUTO: 0.7 % (ref 0–0.9)
LACTATE SERPL-SCNC: 1.5 MMOL/L (ref 0.4–2)
LEFT ATRIUM VOLUME AREA LENGTH INDEX BSA: 48.9 ML/M2
LEFT VENTRICLE INTERNAL DIMENSION DIASTOLE: 3.37 CM (ref 3.5–6)
LEFT VENTRICULAR OUTFLOW TRACT DIAMETER: 2 CM
LV EJECTION FRACTION BIPLANE: 70 %
LYMPHOCYTES # BLD AUTO: 0.92 X10*3/UL (ref 0.8–3)
LYMPHOCYTES NFR BLD AUTO: 6.7 %
MCH RBC QN AUTO: 30.8 PG (ref 26–34)
MCHC RBC AUTO-ENTMCNC: 33.2 G/DL (ref 32–36)
MCV RBC AUTO: 93 FL (ref 80–100)
MONOCYTES # BLD AUTO: 1.13 X10*3/UL (ref 0.05–0.8)
MONOCYTES NFR BLD AUTO: 8.2 %
NEUTROPHILS # BLD AUTO: 11.58 X10*3/UL (ref 1.6–5.5)
NEUTROPHILS NFR BLD AUTO: 84 %
NRBC BLD-RTO: 0 /100 WBCS (ref 0–0)
PLATELET # BLD AUTO: 157 X10*3/UL (ref 150–450)
POTASSIUM SERPL-SCNC: 3.7 MMOL/L (ref 3.5–5.3)
PROT SERPL-MCNC: 7.5 G/DL (ref 6.4–8.2)
RBC # BLD AUTO: 4.25 X10*6/UL (ref 4–5.2)
RIGHT VENTRICLE FREE WALL PEAK S': 5.77 CM/S
RIGHT VENTRICLE PEAK SYSTOLIC PRESSURE: 61 MMHG
SARS-COV-2 RNA RESP QL NAA+PROBE: NOT DETECTED
SODIUM SERPL-SCNC: 133 MMOL/L (ref 136–145)
TRICUSPID ANNULAR PLANE SYSTOLIC EXCURSION: 0.5 CM
WBC # BLD AUTO: 13.8 X10*3/UL (ref 4.4–11.3)

## 2024-12-07 PROCEDURE — 71046 X-RAY EXAM CHEST 2 VIEWS: CPT | Performed by: RADIOLOGY

## 2024-12-07 PROCEDURE — 87040 BLOOD CULTURE FOR BACTERIA: CPT | Mod: PORLAB | Performed by: STUDENT IN AN ORGANIZED HEALTH CARE EDUCATION/TRAINING PROGRAM

## 2024-12-07 PROCEDURE — 99291 CRITICAL CARE FIRST HOUR: CPT

## 2024-12-07 PROCEDURE — 96365 THER/PROPH/DIAG IV INF INIT: CPT

## 2024-12-07 PROCEDURE — 2500000001 HC RX 250 WO HCPCS SELF ADMINISTERED DRUGS (ALT 637 FOR MEDICARE OP): Performed by: NURSE PRACTITIONER

## 2024-12-07 PROCEDURE — 99222 1ST HOSP IP/OBS MODERATE 55: CPT | Performed by: NURSE PRACTITIONER

## 2024-12-07 PROCEDURE — 2500000002 HC RX 250 W HCPCS SELF ADMINISTERED DRUGS (ALT 637 FOR MEDICARE OP, ALT 636 FOR OP/ED): Performed by: INTERNAL MEDICINE

## 2024-12-07 PROCEDURE — 84075 ASSAY ALKALINE PHOSPHATASE: CPT | Performed by: STUDENT IN AN ORGANIZED HEALTH CARE EDUCATION/TRAINING PROGRAM

## 2024-12-07 PROCEDURE — 99285 EMERGENCY DEPT VISIT HI MDM: CPT | Mod: 25 | Performed by: STUDENT IN AN ORGANIZED HEALTH CARE EDUCATION/TRAINING PROGRAM

## 2024-12-07 PROCEDURE — 96367 TX/PROPH/DG ADDL SEQ IV INF: CPT

## 2024-12-07 PROCEDURE — C8929 TTE W OR WO FOL WCON,DOPPLER: HCPCS

## 2024-12-07 PROCEDURE — 87502 INFLUENZA DNA AMP PROBE: CPT | Performed by: STUDENT IN AN ORGANIZED HEALTH CARE EDUCATION/TRAINING PROGRAM

## 2024-12-07 PROCEDURE — 71046 X-RAY EXAM CHEST 2 VIEWS: CPT

## 2024-12-07 PROCEDURE — 84484 ASSAY OF TROPONIN QUANT: CPT | Performed by: STUDENT IN AN ORGANIZED HEALTH CARE EDUCATION/TRAINING PROGRAM

## 2024-12-07 PROCEDURE — 80048 BASIC METABOLIC PNL TOTAL CA: CPT | Performed by: STUDENT IN AN ORGANIZED HEALTH CARE EDUCATION/TRAINING PROGRAM

## 2024-12-07 PROCEDURE — 83880 ASSAY OF NATRIURETIC PEPTIDE: CPT | Performed by: STUDENT IN AN ORGANIZED HEALTH CARE EDUCATION/TRAINING PROGRAM

## 2024-12-07 PROCEDURE — 85025 COMPLETE CBC W/AUTO DIFF WBC: CPT | Performed by: STUDENT IN AN ORGANIZED HEALTH CARE EDUCATION/TRAINING PROGRAM

## 2024-12-07 PROCEDURE — 36415 COLL VENOUS BLD VENIPUNCTURE: CPT | Performed by: STUDENT IN AN ORGANIZED HEALTH CARE EDUCATION/TRAINING PROGRAM

## 2024-12-07 PROCEDURE — 2500000005 HC RX 250 GENERAL PHARMACY W/O HCPCS: Performed by: STUDENT IN AN ORGANIZED HEALTH CARE EDUCATION/TRAINING PROGRAM

## 2024-12-07 PROCEDURE — 87205 SMEAR GRAM STAIN: CPT | Mod: PORLAB | Performed by: NURSE PRACTITIONER

## 2024-12-07 PROCEDURE — 2500000004 HC RX 250 GENERAL PHARMACY W/ HCPCS (ALT 636 FOR OP/ED): Performed by: INTERNAL MEDICINE

## 2024-12-07 PROCEDURE — 2500000001 HC RX 250 WO HCPCS SELF ADMINISTERED DRUGS (ALT 637 FOR MEDICARE OP): Performed by: STUDENT IN AN ORGANIZED HEALTH CARE EDUCATION/TRAINING PROGRAM

## 2024-12-07 PROCEDURE — 2500000004 HC RX 250 GENERAL PHARMACY W/ HCPCS (ALT 636 FOR OP/ED): Performed by: STUDENT IN AN ORGANIZED HEALTH CARE EDUCATION/TRAINING PROGRAM

## 2024-12-07 PROCEDURE — 93306 TTE W/DOPPLER COMPLETE: CPT | Performed by: INTERNAL MEDICINE

## 2024-12-07 PROCEDURE — 93005 ELECTROCARDIOGRAM TRACING: CPT

## 2024-12-07 PROCEDURE — 94640 AIRWAY INHALATION TREATMENT: CPT

## 2024-12-07 PROCEDURE — 2060000001 HC INTERMEDIATE ICU ROOM DAILY

## 2024-12-07 PROCEDURE — 96375 TX/PRO/DX INJ NEW DRUG ADDON: CPT

## 2024-12-07 PROCEDURE — 96376 TX/PRO/DX INJ SAME DRUG ADON: CPT

## 2024-12-07 PROCEDURE — 83605 ASSAY OF LACTIC ACID: CPT | Performed by: STUDENT IN AN ORGANIZED HEALTH CARE EDUCATION/TRAINING PROGRAM

## 2024-12-07 PROCEDURE — 87070 CULTURE OTHR SPECIMN AEROBIC: CPT | Mod: PORLAB | Performed by: NURSE PRACTITIONER

## 2024-12-07 PROCEDURE — 87636 SARSCOV2 & INF A&B AMP PRB: CPT | Performed by: STUDENT IN AN ORGANIZED HEALTH CARE EDUCATION/TRAINING PROGRAM

## 2024-12-07 PROCEDURE — 2500000004 HC RX 250 GENERAL PHARMACY W/ HCPCS (ALT 636 FOR OP/ED): Performed by: NURSE PRACTITIONER

## 2024-12-07 RX ORDER — MAGNESIUM SULFATE HEPTAHYDRATE 40 MG/ML
2 INJECTION, SOLUTION INTRAVENOUS ONCE
Status: COMPLETED | OUTPATIENT
Start: 2024-12-07 | End: 2024-12-07

## 2024-12-07 RX ORDER — FUROSEMIDE 10 MG/ML
20 INJECTION INTRAMUSCULAR; INTRAVENOUS ONCE
Status: COMPLETED | OUTPATIENT
Start: 2024-12-07 | End: 2024-12-07

## 2024-12-07 RX ORDER — ACETAMINOPHEN 325 MG/1
975 TABLET ORAL ONCE
Status: COMPLETED | OUTPATIENT
Start: 2024-12-07 | End: 2024-12-07

## 2024-12-07 RX ORDER — CEFTRIAXONE 1 G/50ML
1 INJECTION, SOLUTION INTRAVENOUS ONCE
Status: COMPLETED | OUTPATIENT
Start: 2024-12-07 | End: 2024-12-07

## 2024-12-07 RX ORDER — GUAIFENESIN 600 MG/1
600 TABLET, EXTENDED RELEASE ORAL 2 TIMES DAILY
Status: DISPENSED | OUTPATIENT
Start: 2024-12-07

## 2024-12-07 RX ORDER — POLYETHYLENE GLYCOL 3350 17 G/17G
17 POWDER, FOR SOLUTION ORAL DAILY
Status: DISPENSED | OUTPATIENT
Start: 2024-12-07

## 2024-12-07 RX ORDER — IPRATROPIUM BROMIDE AND ALBUTEROL SULFATE 2.5; .5 MG/3ML; MG/3ML
3 SOLUTION RESPIRATORY (INHALATION) EVERY 2 HOUR PRN
Status: ACTIVE | OUTPATIENT
Start: 2024-12-07

## 2024-12-07 RX ORDER — FUROSEMIDE 10 MG/ML
40 INJECTION INTRAMUSCULAR; INTRAVENOUS DAILY
Status: DISPENSED | OUTPATIENT
Start: 2024-12-08

## 2024-12-07 RX ORDER — ACETAMINOPHEN 650 MG/1
650 SUPPOSITORY RECTAL EVERY 4 HOURS PRN
Status: ACTIVE | OUTPATIENT
Start: 2024-12-07

## 2024-12-07 RX ORDER — IPRATROPIUM BROMIDE AND ALBUTEROL SULFATE 2.5; .5 MG/3ML; MG/3ML
3 SOLUTION RESPIRATORY (INHALATION) 3 TIMES DAILY
Status: ACTIVE | OUTPATIENT
Start: 2024-12-07

## 2024-12-07 RX ORDER — ASPIRIN 81 MG/1
81 TABLET ORAL DAILY
Status: DISPENSED | OUTPATIENT
Start: 2024-12-07

## 2024-12-07 RX ORDER — ACETAMINOPHEN 325 MG/1
650 TABLET ORAL EVERY 4 HOURS PRN
Status: ACTIVE | OUTPATIENT
Start: 2024-12-07

## 2024-12-07 RX ORDER — METOPROLOL TARTRATE 50 MG/1
50 TABLET ORAL 2 TIMES DAILY
Status: DISPENSED | OUTPATIENT
Start: 2024-12-07

## 2024-12-07 RX ORDER — CEFTRIAXONE 1 G/50ML
1 INJECTION, SOLUTION INTRAVENOUS EVERY 24 HOURS
Status: DISPENSED | OUTPATIENT
Start: 2024-12-08

## 2024-12-07 RX ORDER — IPRATROPIUM BROMIDE AND ALBUTEROL SULFATE 2.5; .5 MG/3ML; MG/3ML
3 SOLUTION RESPIRATORY (INHALATION)
Status: DISCONTINUED | OUTPATIENT
Start: 2024-12-07 | End: 2024-12-07

## 2024-12-07 RX ORDER — ACETAMINOPHEN 160 MG/5ML
650 SOLUTION ORAL EVERY 4 HOURS PRN
Status: ACTIVE | OUTPATIENT
Start: 2024-12-07

## 2024-12-07 SDOH — HEALTH STABILITY: PHYSICAL HEALTH: ON AVERAGE, HOW MANY DAYS PER WEEK DO YOU ENGAGE IN MODERATE TO STRENUOUS EXERCISE (LIKE A BRISK WALK)?: 0 DAYS

## 2024-12-07 SDOH — SOCIAL STABILITY: SOCIAL INSECURITY: ARE YOU OR HAVE YOU BEEN THREATENED OR ABUSED PHYSICALLY, EMOTIONALLY, OR SEXUALLY BY ANYONE?: NO

## 2024-12-07 SDOH — ECONOMIC STABILITY: INCOME INSECURITY: IN THE PAST 12 MONTHS HAS THE ELECTRIC, GAS, OIL, OR WATER COMPANY THREATENED TO SHUT OFF SERVICES IN YOUR HOME?: NO

## 2024-12-07 SDOH — SOCIAL STABILITY: SOCIAL INSECURITY: WITHIN THE LAST YEAR, HAVE YOU BEEN HUMILIATED OR EMOTIONALLY ABUSED IN OTHER WAYS BY YOUR PARTNER OR EX-PARTNER?: NO

## 2024-12-07 SDOH — ECONOMIC STABILITY: FOOD INSECURITY: WITHIN THE PAST 12 MONTHS, THE FOOD YOU BOUGHT JUST DIDN'T LAST AND YOU DIDN'T HAVE MONEY TO GET MORE.: NEVER TRUE

## 2024-12-07 SDOH — SOCIAL STABILITY: SOCIAL INSECURITY: WITHIN THE LAST YEAR, HAVE YOU BEEN AFRAID OF YOUR PARTNER OR EX-PARTNER?: NO

## 2024-12-07 SDOH — SOCIAL STABILITY: SOCIAL INSECURITY
WITHIN THE LAST YEAR, HAVE YOU BEEN RAPED OR FORCED TO HAVE ANY KIND OF SEXUAL ACTIVITY BY YOUR PARTNER OR EX-PARTNER?: NO

## 2024-12-07 SDOH — ECONOMIC STABILITY: HOUSING INSECURITY: AT ANY TIME IN THE PAST 12 MONTHS, WERE YOU HOMELESS OR LIVING IN A SHELTER (INCLUDING NOW)?: NO

## 2024-12-07 SDOH — SOCIAL STABILITY: SOCIAL INSECURITY: ABUSE: ADULT

## 2024-12-07 SDOH — ECONOMIC STABILITY: TRANSPORTATION INSECURITY: IN THE PAST 12 MONTHS, HAS LACK OF TRANSPORTATION KEPT YOU FROM MEDICAL APPOINTMENTS OR FROM GETTING MEDICATIONS?: NO

## 2024-12-07 SDOH — ECONOMIC STABILITY: FOOD INSECURITY: WITHIN THE PAST 12 MONTHS, YOU WORRIED THAT YOUR FOOD WOULD RUN OUT BEFORE YOU GOT THE MONEY TO BUY MORE.: NEVER TRUE

## 2024-12-07 SDOH — SOCIAL STABILITY: SOCIAL INSECURITY
WITHIN THE LAST YEAR, HAVE YOU BEEN KICKED, HIT, SLAPPED, OR OTHERWISE PHYSICALLY HURT BY YOUR PARTNER OR EX-PARTNER?: NO

## 2024-12-07 SDOH — HEALTH STABILITY: MENTAL HEALTH: HOW OFTEN DO YOU HAVE A DRINK CONTAINING ALCOHOL?: NEVER

## 2024-12-07 SDOH — HEALTH STABILITY: PHYSICAL HEALTH: ON AVERAGE, HOW MANY MINUTES DO YOU ENGAGE IN EXERCISE AT THIS LEVEL?: 0 MIN

## 2024-12-07 SDOH — SOCIAL STABILITY: SOCIAL INSECURITY: DO YOU FEEL ANYONE HAS EXPLOITED OR TAKEN ADVANTAGE OF YOU FINANCIALLY OR OF YOUR PERSONAL PROPERTY?: NO

## 2024-12-07 SDOH — HEALTH STABILITY: MENTAL HEALTH: HOW MANY DRINKS CONTAINING ALCOHOL DO YOU HAVE ON A TYPICAL DAY WHEN YOU ARE DRINKING?: PATIENT DOES NOT DRINK

## 2024-12-07 SDOH — SOCIAL STABILITY: SOCIAL INSECURITY: DOES ANYONE TRY TO KEEP YOU FROM HAVING/CONTACTING OTHER FRIENDS OR DOING THINGS OUTSIDE YOUR HOME?: NO

## 2024-12-07 SDOH — HEALTH STABILITY: MENTAL HEALTH: HOW OFTEN DO YOU HAVE SIX OR MORE DRINKS ON ONE OCCASION?: NEVER

## 2024-12-07 SDOH — SOCIAL STABILITY: SOCIAL INSECURITY: DO YOU FEEL UNSAFE GOING BACK TO THE PLACE WHERE YOU ARE LIVING?: NO

## 2024-12-07 SDOH — SOCIAL STABILITY: SOCIAL INSECURITY: HAS ANYONE EVER THREATENED TO HURT YOUR FAMILY OR YOUR PETS?: NO

## 2024-12-07 SDOH — ECONOMIC STABILITY: HOUSING INSECURITY: IN THE PAST 12 MONTHS, HOW MANY TIMES HAVE YOU MOVED WHERE YOU WERE LIVING?: 0

## 2024-12-07 SDOH — SOCIAL STABILITY: SOCIAL INSECURITY: WERE YOU ABLE TO COMPLETE ALL THE BEHAVIORAL HEALTH SCREENINGS?: YES

## 2024-12-07 SDOH — ECONOMIC STABILITY: HOUSING INSECURITY: IN THE LAST 12 MONTHS, WAS THERE A TIME WHEN YOU WERE NOT ABLE TO PAY THE MORTGAGE OR RENT ON TIME?: NO

## 2024-12-07 SDOH — SOCIAL STABILITY: SOCIAL INSECURITY: HAVE YOU HAD THOUGHTS OF HARMING ANYONE ELSE?: NO

## 2024-12-07 SDOH — ECONOMIC STABILITY: FOOD INSECURITY: HOW HARD IS IT FOR YOU TO PAY FOR THE VERY BASICS LIKE FOOD, HOUSING, MEDICAL CARE, AND HEATING?: NOT HARD AT ALL

## 2024-12-07 SDOH — SOCIAL STABILITY: SOCIAL INSECURITY: HAVE YOU HAD ANY THOUGHTS OF HARMING ANYONE ELSE?: NO

## 2024-12-07 SDOH — SOCIAL STABILITY: SOCIAL INSECURITY: ARE THERE ANY APPARENT SIGNS OF INJURIES/BEHAVIORS THAT COULD BE RELATED TO ABUSE/NEGLECT?: NO

## 2024-12-07 ASSESSMENT — ACTIVITIES OF DAILY LIVING (ADL)
GROOMING: INDEPENDENT
HEARING - RIGHT EAR: FUNCTIONAL
PATIENT'S MEMORY ADEQUATE TO SAFELY COMPLETE DAILY ACTIVITIES?: YES
HEARING - LEFT EAR: FUNCTIONAL
ADEQUATE_TO_COMPLETE_ADL: YES
JUDGMENT_ADEQUATE_SAFELY_COMPLETE_DAILY_ACTIVITIES: YES
FEEDING YOURSELF: INDEPENDENT
LACK_OF_TRANSPORTATION: NO
WALKS IN HOME: INDEPENDENT
DRESSING YOURSELF: INDEPENDENT
BATHING: INDEPENDENT
ASSISTIVE_DEVICE: WALKER;EYEGLASSES
TOILETING: INDEPENDENT

## 2024-12-07 ASSESSMENT — LIFESTYLE VARIABLES
HOW MANY STANDARD DRINKS CONTAINING ALCOHOL DO YOU HAVE ON A TYPICAL DAY: PATIENT DOES NOT DRINK
AUDIT-C TOTAL SCORE: 0
SKIP TO QUESTIONS 9-10: 1
HOW OFTEN DO YOU HAVE 6 OR MORE DRINKS ON ONE OCCASION: NEVER
AUDIT-C TOTAL SCORE: 0
HOW OFTEN DO YOU HAVE A DRINK CONTAINING ALCOHOL: NEVER
AUDIT-C TOTAL SCORE: 0
SKIP TO QUESTIONS 9-10: 1

## 2024-12-07 ASSESSMENT — COGNITIVE AND FUNCTIONAL STATUS - GENERAL
DAILY ACTIVITIY SCORE: 19
MOVING TO AND FROM BED TO CHAIR: A LITTLE
DRESSING REGULAR UPPER BODY CLOTHING: A LITTLE
MOVING FROM LYING ON BACK TO SITTING ON SIDE OF FLAT BED WITH BEDRAILS: A LITTLE
STANDING UP FROM CHAIR USING ARMS: A LITTLE
MOBILITY SCORE: 18
HELP NEEDED FOR BATHING: A LITTLE
MOVING FROM LYING ON BACK TO SITTING ON SIDE OF FLAT BED WITH BEDRAILS: A LITTLE
HELP NEEDED FOR BATHING: A LITTLE
CLIMB 3 TO 5 STEPS WITH RAILING: A LITTLE
TOILETING: A LITTLE
MOBILITY SCORE: 17
CLIMB 3 TO 5 STEPS WITH RAILING: A LOT
PATIENT BASELINE BEDBOUND: NO
MOVING TO AND FROM BED TO CHAIR: A LITTLE
MOVING FROM LYING ON BACK TO SITTING ON SIDE OF FLAT BED WITH BEDRAILS: A LITTLE
DRESSING REGULAR UPPER BODY CLOTHING: A LITTLE
MOBILITY SCORE: 17
STANDING UP FROM CHAIR USING ARMS: A LITTLE
WALKING IN HOSPITAL ROOM: A LITTLE
PATIENT BASELINE BEDBOUND: NO
TURNING FROM BACK TO SIDE WHILE IN FLAT BAD: A LITTLE
CLIMB 3 TO 5 STEPS WITH RAILING: A LOT
WALKING IN HOSPITAL ROOM: A LITTLE
DRESSING REGULAR LOWER BODY CLOTHING: A LITTLE
DAILY ACTIVITIY SCORE: 19
TURNING FROM BACK TO SIDE WHILE IN FLAT BAD: A LITTLE
TOILETING: A LITTLE
WALKING IN HOSPITAL ROOM: A LITTLE
DRESSING REGULAR LOWER BODY CLOTHING: A LITTLE
TURNING FROM BACK TO SIDE WHILE IN FLAT BAD: A LITTLE
PERSONAL GROOMING: A LITTLE
STANDING UP FROM CHAIR USING ARMS: A LITTLE
PERSONAL GROOMING: A LITTLE
MOVING TO AND FROM BED TO CHAIR: A LITTLE

## 2024-12-07 ASSESSMENT — PATIENT HEALTH QUESTIONNAIRE - PHQ9
1. LITTLE INTEREST OR PLEASURE IN DOING THINGS: NOT AT ALL
SUM OF ALL RESPONSES TO PHQ9 QUESTIONS 1 & 2: 0
2. FEELING DOWN, DEPRESSED OR HOPELESS: NOT AT ALL

## 2024-12-07 ASSESSMENT — PAIN SCALES - GENERAL
PAINLEVEL_OUTOF10: 0 - NO PAIN

## 2024-12-07 ASSESSMENT — PAIN DESCRIPTION - PROGRESSION: CLINICAL_PROGRESSION: NOT CHANGED

## 2024-12-07 ASSESSMENT — PAIN - FUNCTIONAL ASSESSMENT
PAIN_FUNCTIONAL_ASSESSMENT: 0-10
PAIN_FUNCTIONAL_ASSESSMENT: 0-10

## 2024-12-07 NOTE — H&P
[facility] MEDICINE HISTORY AND PHYSICAL    History Of Present Illness     Linda Roberto is a 89 y.o. female with PMHx of atrial fib on apixaban, HTN, mitral valve insufficiency and chronic bilateral leg edema who presented with cough and SOB for three days. O2 sats in triage were 80-84% on RA.  Family at bedside assist with HPI and say she has had dyspnea and moist cough, lives alone.  She was initially in afib RVR which has resolved. She is not on home O2. She says her leg edema is no worse recently. She has not slept upright since September due to chest pressure, which she denies otherwise. In the ED her clinical picture was concerning for pneumonia as well as pulmonary edema and she was started on IV Abx and placed on oxygen.  Remainder of ROS reviewed and negative except as indicated in HPI.     ED Course:  Vitals on presentation: T 36.8C>38.2C P 104>126 RR 20>32 /83 O2 82/RA  Remarkable labs: creatinine 1.25, , WBC 13.8k, lactate 1.5  EKG: atrial fibrillation with RVR. No STEMI. Normal axis.   Imaging: CXR: Small right pleural effusion   Interventions: Blood cultures, doxycycline, ceftriaxone, IV Lasix, IV magnesium    Objective     Past Medical History  She has a past medical history of Atrial fibrillation (Multi), CKD (chronic kidney disease), HTN (hypertension), Mitral valve disease, and Venous insufficiency.    Surgical History  She has a past surgical history that includes Hysterectomy and Cataract extraction.    Social History     Tobacco Use    Smoking status: Never    Smokeless tobacco: Never   Vaping Use    Vaping status: Never Used   Substance Use Topics    Alcohol use: Never    Drug use: Never       Family History  Family History   Problem Relation Name Age of Onset    Hypertension Mother         Allergies  Lisinopril and Penicillins    Vitals:    12/07/24 1000   BP: (!) 143/91   Pulse: 87   Resp: 18   Temp:    SpO2: 95%       Vitals:    12/07/24 0738   Weight: 56.7 kg (125 lb)        Scheduled medications  apixaban, 2.5 mg, oral, BID  aspirin, 81 mg, oral, Daily  doxycycline, 100 mg, intravenous, Once  furosemide, 40 mg, intravenous, Daily  metoprolol tartrate, 50 mg, oral, BID  oxygen, , inhalation, Continuous - Inhalation  perflutren lipid microspheres, 0.5-10 mL of dilution, intravenous, Once in imaging  polyethylene glycol, 17 g, oral, Daily      Continuous medications     PRN medications  PRN medications: acetaminophen **OR** acetaminophen **OR** acetaminophen    Results for orders placed or performed during the hospital encounter of 12/07/24 (from the past 24 hours)   CBC and Auto Differential   Result Value Ref Range    WBC 13.8 (H) 4.4 - 11.3 x10*3/uL    nRBC 0.0 0.0 - 0.0 /100 WBCs    RBC 4.25 4.00 - 5.20 x10*6/uL    Hemoglobin 13.1 12.0 - 16.0 g/dL    Hematocrit 39.4 36.0 - 46.0 %    MCV 93 80 - 100 fL    MCH 30.8 26.0 - 34.0 pg    MCHC 33.2 32.0 - 36.0 g/dL    RDW 14.8 (H) 11.5 - 14.5 %    Platelets 157 150 - 450 x10*3/uL    Neutrophils % 84.0 40.0 - 80.0 %    Immature Granulocytes %, Automated 0.7 0.0 - 0.9 %    Lymphocytes % 6.7 13.0 - 44.0 %    Monocytes % 8.2 2.0 - 10.0 %    Eosinophils % 0.1 0.0 - 6.0 %    Basophils % 0.3 0.0 - 2.0 %    Neutrophils Absolute 11.58 (H) 1.60 - 5.50 x10*3/uL    Immature Granulocytes Absolute, Automated 0.10 0.00 - 0.50 x10*3/uL    Lymphocytes Absolute 0.92 0.80 - 3.00 x10*3/uL    Monocytes Absolute 1.13 (H) 0.05 - 0.80 x10*3/uL    Eosinophils Absolute 0.01 0.00 - 0.40 x10*3/uL    Basophils Absolute 0.04 0.00 - 0.10 x10*3/uL   Basic metabolic panel   Result Value Ref Range    Glucose 107 (H) 74 - 99 mg/dL    Sodium 133 (L) 136 - 145 mmol/L    Potassium 3.7 3.5 - 5.3 mmol/L    Chloride 92 (L) 98 - 107 mmol/L    Bicarbonate 30 21 - 32 mmol/L    Anion Gap 15 10 - 20 mmol/L    Urea Nitrogen 17 6 - 23 mg/dL    Creatinine 1.25 (H) 0.50 - 1.05 mg/dL    eGFR 41 (L) >60 mL/min/1.73m*2    Calcium 9.4 8.6 - 10.3 mg/dL   Troponin I, High Sensitivity    Result Value Ref Range    Troponin I, High Sensitivity 18 (H) 0 - 13 ng/L   B-Type Natriuretic Peptide   Result Value Ref Range     (H) 0 - 99 pg/mL   Hepatic Function Panel   Result Value Ref Range    Albumin 4.1 3.4 - 5.0 g/dL    Bilirubin, Total 1.2 0.0 - 1.2 mg/dL    Bilirubin, Direct 0.3 0.0 - 0.3 mg/dL    Alkaline Phosphatase 51 33 - 136 U/L    ALT 11 7 - 45 U/L    AST 19 9 - 39 U/L    Total Protein 7.5 6.4 - 8.2 g/dL   Influenza A, and B PCR   Result Value Ref Range    Flu A Result Not Detected Not Detected    Flu B Result Not Detected Not Detected   Sars-CoV-2 PCR   Result Value Ref Range    Coronavirus 2019, PCR Not Detected Not Detected   Lactate   Result Value Ref Range    Lactate 1.5 0.4 - 2.0 mmol/L   Troponin I, High Sensitivity   Result Value Ref Range    Troponin I, High Sensitivity 18 (H) 0 - 13 ng/L       I personally reviewed all pertinent labwork, imaging and vital signs, as well as medications, nursing, therapy, discharge planning and consult notes.     Constitutional: Well developed, awake, alert, calm, oriented x4, no acute distress, cooperative   Eyes: EOMI, clear sclerae   ENMT: mucous membranes moist, no lesions seen   Head/Neck: Neck supple, no apparent injury, head atraumatic   Respiratory/Thorax: bibasilar crackles, frequent moist cough, good chest expansion, respirations even and unlabored, pt on 1.5 lpm o2   Cardiovascular: Irregular rate and rhythm, rate controlled, no murmurs/rubs/gallops, normal S1 and S 2   Gastrointestinal: Abdomen nondistended, soft, nontender, +BS, no bruits   Musculoskeletal: ROM intact, no joint swelling, normal  strength   Extremities: no cyanosis, contusions or clubbing, mild RLE edema (chronic per dtr)   Neurological: no focal deficit, pt alert and oriented x4   Psychological: Appropriate affect and behavior, pleasant   Skin: Warm and dry, no lesions, no rashes       Assessment/Plan     SIRS 2/2 CAP  Acute respiratory failure with hypoxia  Continue  empiric doxycycline and ceftriaxone, will add Mucinex and Duoneb, will obtain sputum cx  Lactate is negative but pt is febrile with leukocytosis, tachycardia and tachypnea    Hx chronic leg edema, elevated BNP  Consult cardiology, pt follows with them for edema  AMANDA 9/2/2022 revealed moderate LLE arterial occlusive disease with hemodynamically significant stenosis and/or occlusion at the left supra-popliteal artery  Venous insufficiency reflux ultrasound 7/22/2022 showed bilateral saphenofemoral vein reflux  Echo 4/20/2022 showed normal LV systolic function with EF of 60% and mild to moderately elevated RVSP, will repeat, pt was supposed to get OP but refused  Start IV Lasix daily for now    Atrial fib with RVR  Spontaneously converted in ED, now rate controlled  Continue metoprolol and apixaban    CKD, at baseline    DVT ppx: apixaban    Discharge disposition  Pending PT/OT luis Vazquez, CNP  [facility] Medicine

## 2024-12-07 NOTE — PROGRESS NOTES
Linda Roberto is a 89 y.o. female admitted for Acute respiratory failure with hypoxia (Multi). Pharmacy reviewed the patient's [id number] medications and allergies for accuracy.    The list below reflects the PTA list prior to pharmacy medication history. A summary a changes to the PTA medication list has been listed below. Please review each medication in order reconciliation for additional clarification and justification.    Source of information:  FAMILY    Medications added:    Medications modified:    Medications to be removed:  ASPIRIN  CALCIUM ,MAG,ZINC  MULTIVITAMINS  MIRALAX      Medications of concern:      Prior to Admission Medications   Prescriptions Last Dose Informant Patient Reported? Taking?   apixaban (Eliquis) 2.5 mg tablet   No No   Sig: Take 1 tablet (2.5 mg) by mouth 2 times a day.   aspirin 81 mg EC tablet   Yes No   Sig: Take 1 tablet (81 mg) by mouth once daily.   calcium carb-D3-mag ox-zinc ox ([place] Mag Zinc Plus D3) 333 mg-133 unit -133 mg-5 mg tablet   Yes No   Sig: Take 1 tablet by mouth once daily.   furosemide (Lasix) 20 mg tablet   No No   Sig: Take 1 tablet (20 mg) by mouth once daily.   metoprolol tartrate (Lopressor) 50 mg tablet   No No   Sig: Take 1 tablet by mouth 2 times a day.   multivitamin-min-iron-FA-vit K (Adults Multivitamin) 18 mg iron-400 mcg-25 mcg tablet   Yes No   Sig: Take 1 tablet by mouth once daily.   polyethylene glycol (Glycolax) 17 gram/dose powder   Yes No   Sig: Mix 17 g of powder and drink once daily.      Facility-Administered Medications: None       Frances Mckeon

## 2024-12-07 NOTE — ED PROVIDER NOTES
HPI   Chief Complaint   Patient presents with    Shortness of Breath    Cough       89-year-old female with past medical history of A-fib on Eliquis, venous insufficiency presenting to the ED with cough.  Patient had a worsening cough over the last 3 days.  She also feels somewhat short of breath.  Denies any chest pain.  No fevers.  Denies worsening leg swelling or calf pain.  No prior VTE, recent surgery, immobilization or active cancer.  She is on Eliquis and has been compliant with this.  No history of asthma, smoker or COPD.              Patient History   Past Medical History:   Diagnosis Date    Body mass index (BMI)30.0-30.9, adult 03/22/2021    Body mass index (BMI) of 30.0 to 30.9 in adult    Personal history of other diseases of the musculoskeletal system and connective tissue     History of osteoporosis    Personal history of other diseases of the nervous system and sense organs     History of cataract    Ventricular premature depolarization 03/23/2020    PVC (premature ventricular contraction)     Past Surgical History:   Procedure Laterality Date    HYSTERECTOMY  04/24/2018    Hysterectomy    OTHER SURGICAL HISTORY  03/23/2020    Cataract surgery     Family History   Problem Relation Name Age of Onset    Hypertension Mother       Social History     Tobacco Use    Smoking status: Never    Smokeless tobacco: Never   Vaping Use    Vaping status: Never Used   Substance Use Topics    Alcohol use: Never    Drug use: Never       Physical Exam   ED Triage Vitals [12/07/24 0738]   Temperature Heart Rate Respirations BP   36.8 °C (98.2 °F) (!) 104 20 141/83      Pulse Ox Temp Source Heart Rate Source Patient Position   (!) 82 % Temporal Monitor --      BP Location FiO2 (%)     -- --       Physical Exam  Vitals and nursing note reviewed.   Constitutional:       General: She is not in acute distress.     Appearance: She is well-developed.   HENT:      Head: Normocephalic and atraumatic.   Eyes:       Conjunctiva/sclera: Conjunctivae normal.   Cardiovascular:      Rate and Rhythm: Normal rate and regular rhythm.      Heart sounds: No murmur heard.  Pulmonary:      Effort: Pulmonary effort is normal. No respiratory distress.      Breath sounds: Examination of the right-middle field reveals rales. Examination of the left-middle field reveals rales. Examination of the right-lower field reveals rales. Examination of the left-lower field reveals rales. Rales present.   Abdominal:      Palpations: Abdomen is soft.      Tenderness: There is no abdominal tenderness.   Musculoskeletal:         General: No swelling.      Cervical back: Neck supple.      Right lower leg: No tenderness. Edema present.      Left lower leg: No tenderness. Edema present.   Skin:     General: Skin is warm and dry.      Capillary Refill: Capillary refill takes less than 2 seconds.   Neurological:      Mental Status: She is alert.   Psychiatric:         Mood and Affect: Mood normal.           ED Course & MDM   ED Course as of 12/07/24 0958   Sat Dec 07, 2024   0755 EKG shows atrial fibrillation with RVR.  No STEMI.  Normal axis. [RS]      ED Course User Index  [RS] Wellington Harper DO         Diagnoses as of 12/07/24 0958   Acute respiratory failure with hypoxia (Multi)   Acute pulmonary edema   SIRS (systemic inflammatory response syndrome) (Multi)                 No data recorded     [place] Coma Scale Score: 15 (12/07/24 0752 : Kimmie Pires RN)                           Medical Decision Making  HISTORIAN:  Patient    CHART REVIEW:  No pertinent findings    PT SUMMARY:  89-year-old female presented ED with shortness of breath, cough.  Vital signs significant for A-fib with RVR.    DDX:  ACS, PE, PNA, COPD, CHF, Anema, Pericardial effusion, Asthma      PLAN:  Will obtain CBC, BMP, EKG, troponin, BNP, chest x-ray    DISPO/RE-EVAL:  Patient CBC significant for leukocytosis of 13,000.  BMP baseline.  Troponins mildly elevated but nondynamic at 18.   BNP elevated at 785.  Chest x-ray shows a small right-sided pleural effusion.  During her ED stay she did develop a fever with a temperature of 100.7.  She was given Tylenol.  Also empirically started IV antibiotics Rocephin and doxycycline for empiric respiratory source.  I do suspect that her hypoxia may be somewhat due to pulm edema.  Therefore, she was given 20 mg of IV Lasix.  Due to these findings we will admit patient to the medical floor for further evaluation and monitoring.    I spent 30 minutes of critical care time in the evaluation and management of patient which is separate than any billable procedures.            Procedure  Procedures     Wellington Harper,   12/07/24 0959

## 2024-12-08 ENCOUNTER — APPOINTMENT (OUTPATIENT)
Dept: RADIOLOGY | Facility: HOSPITAL | Age: 89
DRG: 189 | End: 2024-12-08
Payer: MEDICARE

## 2024-12-08 VITALS
WEIGHT: 125 LBS | TEMPERATURE: 98.2 F | SYSTOLIC BLOOD PRESSURE: 112 MMHG | BODY MASS INDEX: 23.6 KG/M2 | RESPIRATION RATE: 18 BRPM | HEIGHT: 61 IN | DIASTOLIC BLOOD PRESSURE: 50 MMHG | OXYGEN SATURATION: 93 % | HEART RATE: 98 BPM

## 2024-12-08 LAB
ANION GAP SERPL CALC-SCNC: 10 MMOL/L (ref 10–20)
BACTERIA BLD CULT: NORMAL
BACTERIA BLD CULT: NORMAL
BACTERIA SPEC RESP CULT: NORMAL
BUN SERPL-MCNC: 16 MG/DL (ref 6–23)
CALCIUM SERPL-MCNC: 6.7 MG/DL (ref 8.6–10.3)
CHLORIDE SERPL-SCNC: 100 MMOL/L (ref 98–107)
CO2 SERPL-SCNC: 27 MMOL/L (ref 21–32)
CREAT SERPL-MCNC: 0.87 MG/DL (ref 0.5–1.05)
EGFRCR SERPLBLD CKD-EPI 2021: 63 ML/MIN/1.73M*2
ERYTHROCYTE [DISTWIDTH] IN BLOOD BY AUTOMATED COUNT: 14.6 % (ref 11.5–14.5)
GLUCOSE BLD MANUAL STRIP-MCNC: 98 MG/DL (ref 74–99)
GLUCOSE SERPL-MCNC: 72 MG/DL (ref 74–99)
GRAM STN SPEC: NORMAL
GRAM STN SPEC: NORMAL
HCT VFR BLD AUTO: 34.5 % (ref 36–46)
HGB BLD-MCNC: 11.3 G/DL (ref 12–16)
MCH RBC QN AUTO: 30.4 PG (ref 26–34)
MCHC RBC AUTO-ENTMCNC: 32.8 G/DL (ref 32–36)
MCV RBC AUTO: 93 FL (ref 80–100)
NRBC BLD-RTO: 0 /100 WBCS (ref 0–0)
PLATELET # BLD AUTO: 116 X10*3/UL (ref 150–450)
POTASSIUM SERPL-SCNC: 3.5 MMOL/L (ref 3.5–5.3)
RBC # BLD AUTO: 3.72 X10*6/UL (ref 4–5.2)
SODIUM SERPL-SCNC: 133 MMOL/L (ref 136–145)
WBC # BLD AUTO: 10 X10*3/UL (ref 4.4–11.3)

## 2024-12-08 PROCEDURE — 2060000001 HC INTERMEDIATE ICU ROOM DAILY

## 2024-12-08 PROCEDURE — 71260 CT THORAX DX C+: CPT

## 2024-12-08 PROCEDURE — 2550000001 HC RX 255 CONTRASTS: Performed by: INTERNAL MEDICINE

## 2024-12-08 PROCEDURE — 2500000004 HC RX 250 GENERAL PHARMACY W/ HCPCS (ALT 636 FOR OP/ED): Performed by: NURSE PRACTITIONER

## 2024-12-08 PROCEDURE — 99222 1ST HOSP IP/OBS MODERATE 55: CPT | Performed by: INTERNAL MEDICINE

## 2024-12-08 PROCEDURE — 85027 COMPLETE CBC AUTOMATED: CPT | Performed by: NURSE PRACTITIONER

## 2024-12-08 PROCEDURE — 97165 OT EVAL LOW COMPLEX 30 MIN: CPT | Mod: GO

## 2024-12-08 PROCEDURE — 99232 SBSQ HOSP IP/OBS MODERATE 35: CPT | Performed by: NURSE PRACTITIONER

## 2024-12-08 PROCEDURE — 97161 PT EVAL LOW COMPLEX 20 MIN: CPT | Mod: GP | Performed by: PHYSICAL THERAPIST

## 2024-12-08 PROCEDURE — 2500000002 HC RX 250 W HCPCS SELF ADMINISTERED DRUGS (ALT 637 FOR MEDICARE OP, ALT 636 FOR OP/ED): Performed by: INTERNAL MEDICINE

## 2024-12-08 PROCEDURE — 2500000005 HC RX 250 GENERAL PHARMACY W/O HCPCS: Performed by: NURSE PRACTITIONER

## 2024-12-08 PROCEDURE — 80048 BASIC METABOLIC PNL TOTAL CA: CPT | Performed by: NURSE PRACTITIONER

## 2024-12-08 PROCEDURE — 82947 ASSAY GLUCOSE BLOOD QUANT: CPT

## 2024-12-08 PROCEDURE — 2500000005 HC RX 250 GENERAL PHARMACY W/O HCPCS: Performed by: STUDENT IN AN ORGANIZED HEALTH CARE EDUCATION/TRAINING PROGRAM

## 2024-12-08 PROCEDURE — 2500000001 HC RX 250 WO HCPCS SELF ADMINISTERED DRUGS (ALT 637 FOR MEDICARE OP): Performed by: NURSE PRACTITIONER

## 2024-12-08 PROCEDURE — 94640 AIRWAY INHALATION TREATMENT: CPT

## 2024-12-08 PROCEDURE — 36415 COLL VENOUS BLD VENIPUNCTURE: CPT | Performed by: NURSE PRACTITIONER

## 2024-12-08 ASSESSMENT — COGNITIVE AND FUNCTIONAL STATUS - GENERAL
CLIMB 3 TO 5 STEPS WITH RAILING: A LITTLE
PERSONAL GROOMING: A LITTLE
CLIMB 3 TO 5 STEPS WITH RAILING: A LOT
HELP NEEDED FOR BATHING: A LOT
STANDING UP FROM CHAIR USING ARMS: A LITTLE
MOBILITY SCORE: 21
HELP NEEDED FOR BATHING: A LITTLE
TOILETING: A LITTLE
DRESSING REGULAR LOWER BODY CLOTHING: A LITTLE
MOBILITY SCORE: 21
WALKING IN HOSPITAL ROOM: A LITTLE
TOILETING: A LITTLE
DRESSING REGULAR LOWER BODY CLOTHING: A LITTLE
WALKING IN HOSPITAL ROOM: A LITTLE
DRESSING REGULAR UPPER BODY CLOTHING: A LITTLE
DAILY ACTIVITIY SCORE: 18
DAILY ACTIVITIY SCORE: 21

## 2024-12-08 ASSESSMENT — PAIN - FUNCTIONAL ASSESSMENT
PAIN_FUNCTIONAL_ASSESSMENT: 0-10

## 2024-12-08 ASSESSMENT — ACTIVITIES OF DAILY LIVING (ADL)
BATHING_ASSISTANCE: MODERATE
ADL_ASSISTANCE: INDEPENDENT
ADL_ASSISTANCE: NEEDS ASSISTANCE

## 2024-12-08 ASSESSMENT — PAIN SCALES - GENERAL
PAINLEVEL_OUTOF10: 0 - NO PAIN

## 2024-12-08 NOTE — CARE PLAN
The patient's goals for the shift include      The clinical goals for the shift include Patient will maintain SPO2 >90% this shift      Problem: Nutrition  Goal: Adequate PO fluid intake  Outcome: Progressing  Goal: Nutrition support goals are met within 48 hrs  Outcome: Progressing  Goal: Electrolytes WNL  Outcome: Progressing  Goal: Promote healing  Outcome: Progressing  Goal: Maintain stable weight  Outcome: Progressing     Problem: Pain - Adult  Goal: Verbalizes/displays adequate comfort level or baseline comfort level  Outcome: Progressing     Problem: Safety - Adult  Goal: Free from fall injury  Outcome: Progressing     Problem: Discharge Planning  Goal: Discharge to home or other facility with appropriate resources  Outcome: Progressing     Problem: Chronic Conditions and Co-morbidities  Goal: Patient's chronic conditions and co-morbidity symptoms are monitored and maintained or improved  Outcome: Progressing     Problem: Skin  Goal: Participates in plan/prevention/treatment measures  Outcome: Progressing  Goal: Prevent/manage excess moisture  Outcome: Progressing  Goal: Prevent/minimize sheer/friction injuries  Outcome: Progressing  Goal: Promote/optimize nutrition  Outcome: Progressing

## 2024-12-08 NOTE — CONSULTS
Inpatient consult to Cardiology  Consult performed by: Sunil Woods MD  Consult ordered by: DENZEL Pineda-CNP        History Of Present Illness:    Linda Roberto is a 89 y.o. female with PMHx of atrial fib on apixaban, HTN, mitral valve insufficiency and chronic bilateral leg edema who presented with cough and SOB for three days. O2 sats in triage were 80-84% on RA.  Family at bedside assist with HPI and say she has had dyspnea and moist cough, lives alone.  She was initially in afib RVR which has resolved. She is not on home O2. She says her leg edema is no worse recently. She has not slept upright since September due to chest pressure, which she denies otherwise. In the ED her clinical picture was concerning for pneumonia as well as pulmonary edema and she was started on IV Abx and placed on oxygen.  Remainder of ROS reviewed and negative except as indicated in HPI.      Last Recorded Vitals:  Vitals:    12/07/24 2334 12/08/24 0405 12/08/24 0728 12/08/24 0754   BP: 110/71 105/70  130/86   BP Location: Left arm Right arm  Right arm   Patient Position: Lying Lying  Lying   Pulse: 80 90  92   Resp: 18 18  18   Temp: 36.8 °C (98.2 °F) 36.9 °C (98.4 °F)  37.1 °C (98.8 °F)   TempSrc: Temporal Temporal  Temporal   SpO2: 96% 93% 95% 90%   Weight:       Height:           Last Labs:  CBC - 12/8/2024:  5:21 AM  10.0 11.3 116    34.5      CMP - 12/8/2024:  5:21 AM  6.7 7.5 19 --- 1.2   _ 4.1 11 51      PTT - No results in last year.  _   _ _     Troponin I, High Sensitivity   Date/Time Value Ref Range Status   12/07/2024 08:48 AM 18 (H) 0 - 13 ng/L Final   12/07/2024 07:59 AM 18 (H) 0 - 13 ng/L Final     BNP   Date/Time Value Ref Range Status   12/07/2024 07:59  (H) 0 - 99 pg/mL Final     Hemoglobin A1C   Date/Time Value Ref Range Status   08/17/2020 09:06 AM 5.8 % Final     Comment:          Diagnosis of Diabetes-Adults   Non-Diabetic: < or = 5.6%   Increased risk for developing diabetes: 5.7-6.4%   Diagnostic of  diabetes: > or = 6.5%  .       Monitoring of Diabetes                Age (y)     Therapeutic Goal (%)   Adults:          >18           <7.0   Pediatrics:    13-18           <7.5                   7-12           <8.0                   0- 6            7.5-8.5   American Diabetes Association. Diabetes Care 33(S1), Jan 2010.       LDL Calculated   Date/Time Value Ref Range Status   04/30/2024 07:32  (H) <=99 mg/dL Final     Comment:                                 Near   Borderline      AGE      Desirable  Optimal    High     High     Very High     0-19 Y     0 - 109     ---    110-129   >/= 130     ----    20-24 Y     0 - 119     ---    120-159   >/= 160     ----      >24 Y     0 -  99   100-129  130-159   160-189     >/=190       VLDL   Date/Time Value Ref Range Status   04/30/2024 07:32 AM 16 0 - 40 mg/dL Final   05/01/2023 08:40 AM 19 0 - 40 mg/dL Final      Last I/O:  I/O last 3 completed shifts:  In: 150 (2.6 mL/kg) [IV Piggyback:150]  Out: 600 (10.6 mL/kg) [Urine:600 (0.3 mL/kg/hr)]  Weight: 56.7 kg     Past Cardiology Tests (Last 3 Years):  EKG:  ECG 12 Lead 09/20/2024    Echo:  Transthoracic Echo (TTE) Complete 12/07/2024    Ejection Fractions:  EF   Date/Time Value Ref Range Status   12/07/2024 11:22 AM 70 %      Cath:  No results found for this or any previous visit from the past 1095 days.    Stress Test:  No results found for this or any previous visit from the past 1095 days.    Cardiac Imaging:  No results found for this or any previous visit from the past 1095 days.      Past Medical History:  She has a past medical history of (HFpEF) heart failure with preserved ejection fraction, Atrial fibrillation (Multi), CKD (chronic kidney disease), HTN (hypertension), Mitral valve disease, and Venous insufficiency.    Past Surgical History:  She has a past surgical history that includes Hysterectomy and Cataract extraction.      Social History:  She reports that she has never smoked. She has never used  smokeless tobacco. She reports that she does not drink alcohol and does not use drugs.    Family History:  Family History   Problem Relation Name Age of Onset    Hypertension Mother          Allergies:  Lisinopril and Penicillins    Inpatient Medications:  Scheduled medications   Medication Dose Route Frequency    apixaban  2.5 mg oral BID    aspirin  81 mg oral Daily    cefTRIAXone  1 g intravenous q24h    doxycycline  100 mg intravenous q12h    furosemide  40 mg intravenous Daily    guaiFENesin  600 mg oral BID    ipratropium-albuteroL  3 mL nebulization TID    metoprolol tartrate  50 mg oral BID    oxygen   inhalation Continuous - Inhalation    perflutren lipid microspheres  0.5-10 mL of dilution intravenous Once in imaging    perflutren lipid microspheres  0.5-10 mL of dilution intravenous Once in imaging    polyethylene glycol  17 g oral Daily    sulfur hexafluoride microsphr  2 mL intravenous Once in imaging     PRN medications   Medication    acetaminophen    Or    acetaminophen    Or    acetaminophen    ipratropium-albuteroL     Continuous Medications   Medication Dose Last Rate     Outpatient Medications:  Current Outpatient Medications   Medication Instructions    apixaban (ELIQUIS) 2.5 mg, oral, 2 times daily    furosemide (LASIX) 20 mg, oral, Daily    metoprolol tartrate (LOPRESSOR) 50 mg, oral, 2 times daily       Physical Exam:  Constitutional: Well developed, awake, alert, calm, oriented x4, no acute distress, cooperative   Eyes: EOMI, clear sclerae   ENMT: mucous membranes moist, no lesions seen   Head/Neck: Neck supple, no apparent injury, head atraumatic   Respiratory/Thorax: Decreased BS with expiratory wheezing   Cardiovascular: Irregular rate and rhythm, rate controlled, no murmurs/rubs/gallops, normal S1 and S 2   Gastrointestinal: Abdomen nondistended, soft, nontender, +BS, no bruits   Musculoskeletal: ROM intact, no joint swelling, normal  strength   Extremities: no cyanosis, contusions or  clubbing, mild RLE edema (chronic per dtr)   Neurological: no focal deficit, pt alert and oriented x4   Psychological: Appropriate affect and behavior, pleasant   Skin: Warm and dry, no lesions, no rashes         Assessment/Plan   1) Shortness of breath  I suspect underlying pulmonary causes along with diastolic dysfunction (? Restrictive Cardiomyopathy of elderly)  On IV diuresis, Metoprolol  Echo with normal LV function and no sig valvular disease    2) Permanent Atrial fib  Rates controlled  On Eliquis    3) Acute on  Chronic right heart failure  I suspect due to restrictive CMP and ? Chronic hypoxia from Lungs  Check CT chest to R/O pulmonary pathology  May consider Pulmonary consult         Code Status:  Full Code    I spent 30 minutes in the professional and overall care of this patient.        Sunil Woods MD

## 2024-12-08 NOTE — PROGRESS NOTES
[facility] MEDICINE PROGRESS NOTE    Subjective     Pt OOB in chair, family at bedside. She says her SOB is unchanged. She is not urinating much more often.     Objective     I personally reviewed all pertinent labwork, imaging and vital signs, as well as nursing, therapy, discharge planning and consult notes.     Vitals:    12/08/24 0728   BP:    Pulse:    Resp:    Temp:    SpO2: 95%       Vitals:    12/07/24 0738   Weight: 56.7 kg (125 lb)       Scheduled medications  apixaban, 2.5 mg, oral, BID  aspirin, 81 mg, oral, Daily  cefTRIAXone, 1 g, intravenous, q24h  doxycycline, 100 mg, intravenous, q12h  furosemide, 40 mg, intravenous, Daily  guaiFENesin, 600 mg, oral, BID  ipratropium-albuteroL, 3 mL, nebulization, TID  metoprolol tartrate, 50 mg, oral, BID  oxygen, , inhalation, Continuous - Inhalation  perflutren lipid microspheres, 0.5-10 mL of dilution, intravenous, Once in imaging  perflutren lipid microspheres, 0.5-10 mL of dilution, intravenous, Once in imaging  polyethylene glycol, 17 g, oral, Daily  sulfur hexafluoride microsphr, 2 mL, intravenous, Once in imaging      Continuous medications     PRN medications  PRN medications: acetaminophen **OR** acetaminophen **OR** acetaminophen, ipratropium-albuteroL    Results for orders placed or performed during the hospital encounter of 12/07/24 (from the past 24 hours)   CBC and Auto Differential   Result Value Ref Range    WBC 13.8 (H) 4.4 - 11.3 x10*3/uL    nRBC 0.0 0.0 - 0.0 /100 WBCs    RBC 4.25 4.00 - 5.20 x10*6/uL    Hemoglobin 13.1 12.0 - 16.0 g/dL    Hematocrit 39.4 36.0 - 46.0 %    MCV 93 80 - 100 fL    MCH 30.8 26.0 - 34.0 pg    MCHC 33.2 32.0 - 36.0 g/dL    RDW 14.8 (H) 11.5 - 14.5 %    Platelets 157 150 - 450 x10*3/uL    Neutrophils % 84.0 40.0 - 80.0 %    Immature Granulocytes %, Automated 0.7 0.0 - 0.9 %    Lymphocytes % 6.7 13.0 - 44.0 %    Monocytes % 8.2 2.0 - 10.0 %    Eosinophils % 0.1 0.0 - 6.0 %    Basophils % 0.3 0.0 - 2.0 %     Neutrophils Absolute 11.58 (H) 1.60 - 5.50 x10*3/uL    Immature Granulocytes Absolute, Automated 0.10 0.00 - 0.50 x10*3/uL    Lymphocytes Absolute 0.92 0.80 - 3.00 x10*3/uL    Monocytes Absolute 1.13 (H) 0.05 - 0.80 x10*3/uL    Eosinophils Absolute 0.01 0.00 - 0.40 x10*3/uL    Basophils Absolute 0.04 0.00 - 0.10 x10*3/uL   Basic metabolic panel   Result Value Ref Range    Glucose 107 (H) 74 - 99 mg/dL    Sodium 133 (L) 136 - 145 mmol/L    Potassium 3.7 3.5 - 5.3 mmol/L    Chloride 92 (L) 98 - 107 mmol/L    Bicarbonate 30 21 - 32 mmol/L    Anion Gap 15 10 - 20 mmol/L    Urea Nitrogen 17 6 - 23 mg/dL    Creatinine 1.25 (H) 0.50 - 1.05 mg/dL    eGFR 41 (L) >60 mL/min/1.73m*2    Calcium 9.4 8.6 - 10.3 mg/dL   Troponin I, High Sensitivity   Result Value Ref Range    Troponin I, High Sensitivity 18 (H) 0 - 13 ng/L   B-Type Natriuretic Peptide   Result Value Ref Range     (H) 0 - 99 pg/mL   Hepatic Function Panel   Result Value Ref Range    Albumin 4.1 3.4 - 5.0 g/dL    Bilirubin, Total 1.2 0.0 - 1.2 mg/dL    Bilirubin, Direct 0.3 0.0 - 0.3 mg/dL    Alkaline Phosphatase 51 33 - 136 U/L    ALT 11 7 - 45 U/L    AST 19 9 - 39 U/L    Total Protein 7.5 6.4 - 8.2 g/dL   Influenza A, and B PCR   Result Value Ref Range    Flu A Result Not Detected Not Detected    Flu B Result Not Detected Not Detected   Sars-CoV-2 PCR   Result Value Ref Range    Coronavirus 2019, PCR Not Detected Not Detected   Lactate   Result Value Ref Range    Lactate 1.5 0.4 - 2.0 mmol/L   Troponin I, High Sensitivity   Result Value Ref Range    Troponin I, High Sensitivity 18 (H) 0 - 13 ng/L   Blood Culture    Specimen: Peripheral Venipuncture; Blood culture   Result Value Ref Range    Blood Culture Loaded on Instrument - Culture in progress    Blood Culture    Specimen: Peripheral Venipuncture; Blood culture   Result Value Ref Range    Blood Culture Loaded on Instrument - Culture in progress    Transthoracic Echo (TTE) Complete   Result Value Ref  Range    LVOT diam 2.00 cm    LV Biplane EF 70 %    Tricuspid annular plane systolic excursion 0.5 cm    AV mn grad 1 mmHg    LA vol index A/L 48.9 ml/m2    AV pk florecita 0.81 m/s    LV EF 70 %    RV free wall pk S' 5.77 cm/s    RVSP 61.0 mmHg    LVIDd 3.37 cm    Aortic Valve Area by Continuity of VTI 1.62 cm2    Aortic Valve Area by Continuity of Peak Velocity 1.67 cm2    AV pk grad 3 mmHg    LV A4C EF 69.5    CBC   Result Value Ref Range    WBC 10.0 4.4 - 11.3 x10*3/uL    nRBC 0.0 0.0 - 0.0 /100 WBCs    RBC 3.72 (L) 4.00 - 5.20 x10*6/uL    Hemoglobin 11.3 (L) 12.0 - 16.0 g/dL    Hematocrit 34.5 (L) 36.0 - 46.0 %    MCV 93 80 - 100 fL    MCH 30.4 26.0 - 34.0 pg    MCHC 32.8 32.0 - 36.0 g/dL    RDW 14.6 (H) 11.5 - 14.5 %    Platelets 116 (L) 150 - 450 x10*3/uL   Basic metabolic panel   Result Value Ref Range    Glucose 72 (L) 74 - 99 mg/dL    Sodium 133 (L) 136 - 145 mmol/L    Potassium 3.5 3.5 - 5.3 mmol/L    Chloride 100 98 - 107 mmol/L    Bicarbonate 27 21 - 32 mmol/L    Anion Gap 10 10 - 20 mmol/L    Urea Nitrogen 16 6 - 23 mg/dL    Creatinine 0.87 0.50 - 1.05 mg/dL    eGFR 63 >60 mL/min/1.73m*2    Calcium 6.7 (L) 8.6 - 10.3 mg/dL       Constitutional: Well developed, awake, alert, calm, oriented x4, no acute distress, cooperative   Eyes: EOMI, clear sclerae   ENMT: mucous membranes moist, no lesions seen   Head/Neck: Neck supple, no apparent injury, head atraumatic   Respiratory/Thorax: bibasilar crackles, frequent moist cough, good chest expansion, respirations even and unlabored, pt on 1.5 lpm o2   Cardiovascular: Irregular rate and rhythm, rate controlled, no murmurs/rubs/gallops, normal S1 and S 2   Gastrointestinal: Abdomen nondistended, soft, nontender, +BS, no bruits   Musculoskeletal: ROM intact, no joint swelling, normal  strength   Extremities: no cyanosis, contusions or clubbing, mild RLE edema (chronic per dtr)   Neurological: no focal deficit, pt alert and oriented x4   Psychological: Appropriate  affect and behavior, pleasant   Skin: Warm and dry, no lesions, no rashes       Past Medical History:   Diagnosis Date    (HFpEF) heart failure with preserved ejection fraction     Atrial fibrillation (Multi)     CKD (chronic kidney disease)     HTN (hypertension)     Mitral valve disease     Venous insufficiency         Assessment/Plan     SIRS 2/2 CAP  Acute respiratory failure with hypoxia  Day #2 empiric doxycycline and ceftriaxone, continue Mucinex and Duoneb, sputum cx pending  I educated pt on incentive spirometry 10x/hour  Wean O2 off as tolerated to maintain O2 sat >91%, pt may need home O2 eval      Hx chronic leg edema, elevated BNP  Cardiology consulted, pt follows with them for edema  AMANDA 9/2/2022 revealed moderate LLE arterial occlusive disease significant stenosis and/or occlusion at the left supra-popliteal artery  Venous insufficiency reflux ultrasound 7/22/2022 showed bilateral saphenofemoral vein reflux  Echo 4/20/2022 showed normal LV systolic function with EF of 70% and severely reduced RVSP  Repeat echo yesterday revealed   Continue IV Lasix daily for now     Atrial fib with RVR  Spontaneously converted in ED, now rate controlled  Continue metoprolol and apixaban     CKD, at baseline     DVT ppx: apixaban     Discharge disposition  Pending PT/OT evals      Grecia Vazquez, CNP  [facility]

## 2024-12-08 NOTE — PROGRESS NOTES
Occupational Therapy  Evaluation    Patient Name: Linda Roberto  MRN: [id number]  Today's Date: 12/8/2024  Time Calculation  Start Time: 1151  Stop Time: 1208  Time Calculation (min): 17 min    Current Problem:   1. Acute respiratory failure with hypoxia (Multi)    2. Acute pulmonary edema    3. SIRS (systemic inflammatory response syndrome) (Multi)        OT order: OT eval and treat   Referred by: George  Reason for referral: ADL  Past medical history related to rehab:   Past Medical History:   Diagnosis Date    (HFpEF) heart failure with preserved ejection fraction     Atrial fibrillation (Multi)     CKD (chronic kidney disease)     HTN (hypertension)     Mitral valve disease     Venous insufficiency          Precautions:   Medical Precautions: Fall precautions, Oxygen therapy device and L/min (1L NC)  Precautions Comment: No home O2    ASSESSMENT  OT Assessment: Pt is a X female/male presenting with X. Prior to admit, pt was independnet with ADLs, and iADLs, did not use device for mobility. Upon eval pt required X for bed mobility, LB dressing, toileting. Pt able to complete transfers with X and RW. Pt appears below functional baseline and would benefit from acute skilled OT services.. Pt with    Prognosis: Good  Barriers to discharge: None  Tolerance: Patient tolerated treatment well    PLAN  Frequency:  (X)  Treatment Interventions:    Discharge Recommendations: Low intensity level of continued care  OT OK to discharge: Yes    GENERAL VISIT INFORMATION   Start of session communication: Bedside nurse  End of session communication: Bedside nurse  Family/caregiver present: No  Caregiver feedback:    Co-Treatment: PT  Reason for co-treatment: To maximize pt safety while focusing on specifc discipline goals   Position Pt Received:  Up in chair, Alarm on  End of session position: Up in chair, Alarm on    SUBJECTIVE  Home Living:  Type of Home: House  Lives With: Alone  Home Adaptive Equipment: None  Home Layout: One  level, Laundry in basement  Home Living Comments:  (Pt states that her son  picks her up and drops her off every night for her to sleep at his home, she is alone during the day time.)     Prior Level of Function:  Level of [place]: Independent with ADLs and functional transfers, Independent with homemaking with wheelchair  Receives Help From: Family  ADL Assistance: Independent  Prior Function Comments: pt states she does her own housework except vacuuming (uncertain accuracy of info)    IADL History:  Current License: No  Mode of Transportation: Family  IADL Comments:  (DIL brings groceries to her)    Pain:  Assessment: 0-10  Score: 0 - No pain      OBJECTIVE  Vital Signs:  Vital Signs Comment: no concerns    Cognition:  Overall Cognitive Status: Impaired at baseline  Orientation Level: Disoriented to situation, Disoriented to time  Short-Term Memory: Impaired  Safety/Judgement: Exceptions to WFL  Unable to Self-Monitor and Self-Correct Consistently: Moderate  Impulsive: Moderately             Current ADL function:   EATING:  Stand by Supervision/safety, Setup (anticipated)   GROOMING: Minimal Supervision/safety (anticipated)   BATHING: Moderate  (anticipated)   UB DRESSING: Minimal  (anticipated)   LB DRESSING: Minimal  (anticipated)   TOILETING: Minimal  (anticipated)  ADL comments:       Activity Tolerance:  Endurance: Endurance does not limit participation in activity, Tolerates less than 10 min exercise, no significant change in vital signs    Bed Mobility/Transfers:   Bed Mobility  Bed Mobility: No  Transfers  Transfer:  (STS <> SUP with min VC for safety, no device)    Ambulation/Gait Training:  Functional Mobility  Functional Mobility Performed:  (Mod household distance within room, SUP without device. MIN VC for safey, no LOB)    Sitting Balance:  Static Sitting Balance  Static Sitting-Level of Assistance: Modified Independent  Dynamic Sitting Balance  Dynamic Sitting-Level of Assistance: Close  supervision    Standing Balance:  Static Standing Balance  Static Standing-Level of Assistance: Close supervision  Dynamic Standing Balance  Dynamic Standing-Level of Assistance: Close supervision    Vision: Vision - Basic Assessment  Current Vision: Wears glasses all the time   and      Sensation:  Light Touch: No apparent deficits    Strength:  Strength Comments: CHAI WFL    Perception:  Inattention/Neglect: Appears intact    Coordination:  Movements are Fluid and Coordinated: Yes     Hand Function:  Hand Function  Gross Grasp: Functional  Coordination: Functional    Extremities:   and      Outcome Measures: [facility] Daily Activity   Putting on and taking off regular lower body clothing: A little  Bathing (including washing, rinsing, drying): A lot  Putting on and taking off regular upper body clothing: A little  Toileting, which includes using toilet, bedpan or urinal: A little  Taking care of personal grooming such as brushing teeth: A little   Eating Meals: None   Daily Activity - Total Score: 18    EDUCATION:     Education Documentation  Precautions, taught by Maura Carpio OT at 12/8/2024  2:47 PM.  Learner: Patient  Readiness: Eager  Method: Explanation  Response: Verbalizes Understanding  Comment: adl retrianing/saftey    ADL Training, taught by Maura Carpio OT at 12/8/2024  2:47 PM.  Learner: Patient  Readiness: Eager  Method: Explanation  Response: Verbalizes Understanding  Comment: adl retrianing/saftey    Education Comments  No comments found.        Goals: n/a

## 2024-12-08 NOTE — PROGRESS NOTES
Physical Therapy    Physical Therapy Evaluation    Patient Name: Linda Roberto  MRN: [id number]  Today's Date: 12/8/2024   Time Calculation  Start Time: 1150  Stop Time: 1208  Time Calculation (min): 18 min  2007/2007-A    Assessment/Plan   PT Assessment  PT Assessment Results: Decreased cognition, Impaired judgement, Decreased safety awareness  Rehab Prognosis: Good  Barriers to Discharge: confusion/memory issues-nned for 24/7 supv  Evaluation/Treatment Tolerance: Patient tolerated treatment well  Barriers to Participation: Ability to acquire knowledge, Insight into problems, Housing layout  Assessment Comment: pt demos steady gait however also demos cognitive issues which place pt at higher risk of falls. Recommend LOW INTENSITY REHAB for Home Safety Assessment. Also recommend 24/7 SUPERVISION  End of Session Patient Position: Up in chair, Alarm on  IP OR SWING BED PT PLAN  Inpatient or Swing Bed: Inpatient  PT Plan  Treatment/Interventions: Gait training  PT Plan: PT Eval only  PT Eval Only Reason: Does not meet criteria for skilled interventions (continue walking with nursing staff supv)  PT Frequency: PT eval only  PT Discharge Recommendations: Low intensity level of continued care, 24 hr supervision due to cognition  Equipment Recommended upon Discharge: Other (comment) (concern for tripping hazard if needs Home O2. may be difficult to teach new use of walker if needed d/t memory issues.)  PT Recommended Transfer Status: Stand by assist  PT - OK to Discharge: Yes    Subjective     Current Problem:  1. Acute respiratory failure with hypoxia (Multi)  Transthoracic Echo (TTE) Complete    Transthoracic Echo (TTE) Complete      2. Acute pulmonary edema        3. SIRS (systemic inflammatory response syndrome) (Multi)          General Visit Information:  General  Reason for Referral: adm for cough and SOB  Referred By: MAKEDA HETAON. PT FOR IMPAIRED MOBILITY  Family/Caregiver Present: No  Co-Treatment: OT  Co-Treatment  Reason: optimize pt safety while focus discipline specific goals  Prior to Session Communication: Bedside nurse (approved PT)  Patient Position Received: Up in chair, Alarm on  General Comment: pt seen in 2007 with tele and O2 1L, alert and very motivated for activity.    Home Living:  Home Living  Type of Home: House  Lives With: Alone  Home Adaptive Equipment: None  Home Layout: One level, Laundry in basement (pt states she does negotiate 7 steps to basement laundry)  Home Access: Other (Comment) (go)  Home Living Comments: unsure of accuracy of info provided by pt d/t confusion    Prior Level of Function:  Prior Function Per Pt/Caregiver Report  Receives Help From: Family (son and dtr-in-law)  ADL Assistance: Needs assistance  Bath:  (stated she could only shower when DIL present but was non-committal)  Ambulatory Assistance: Independent (pt demos FURNITURE WALKING on eval)  Prior Function Comments: pt states she does her own housework except vacuuming (uncertain accuracy of info)    Precautions:  Precautions  Medical Precautions: Fall precautions  Precautions Comment: No home O2     Objective     Pain:  Pain Assessment  Pain Assessment: 0-10  0-10 (Numeric) Pain Score: 0 - No pain    Cognition:  Cognition  Overall Cognitive Status: Impaired at baseline (possible memory issues?)  Orientation Level: Disoriented to situation, Disoriented to time  Memory: Exceptions to WFL  Short-Term Memory: Impaired  Insight: Severe  Impulsive: Moderately    General Assessments:  General Observation  General Observation: stood spontaneously from chair with no awareness of O2 tubing hazard, gait training around room and just into hallway (pt declined further amb in hallway), RTC per pt preference, ALARM ON   Activity Tolerance  Endurance: Endurance does not limit participation in activity  Activity Tolerance Comments: asymptomatic amb on O2     Postural Control  Postural Control: Within Functional Limits  Dynamic Sitting  Balance  Dynamic Sitting-Comments: normal  Dynamic Standing Balance  Dynamic Standing-Comments: good- VC for improved attention to surroundings, awareness of O2 line    Functional Assessments:     Bed Mobility  Bed Mobility: No  Transfer 1  Transfer From 1: Chair with arms to  Technique 1: Sit to stand  Transfer Level of Assistance 1: Close supervision  Ambulation/Gait Training 1  Device 1: No device  Assistance 1: Close supervision  Comments/Distance (ft) 1: 50  Stairs  Stairs: No       Extremity/Trunk Assessments:        RLE   RLE : Within Functional Limits (strength 5/5)  LLE   LLE : Within Functional Limits (strength 5/5)    Outcome Measures:     [facility] Basic Mobility  Turning from your back to your side while in a flat bed without using bedrails: None  Moving from lying on your back to sitting on the side of a flat bed without using bedrails: None  Moving to and from bed to chair (including a wheelchair): None  Standing up from a chair using your arms (e.g. wheelchair or bedside chair): None  To walk in hospital room: A little  Climbing 3-5 steps with railing: A lot  Basic Mobility - Total Score: 21       Education Documentation  Precautions, taught by Olive Leavitt, PT at 12/8/2024 12:32 PM.  Learner: Patient  Readiness: Acceptance  Method: Explanation, Demonstration  Response: Needs Reinforcement    Mobility Training, taught by Olive Leavitt PT at 12/8/2024 12:32 PM.  Learner: Patient  Readiness: Acceptance  Method: Explanation, Demonstration  Response: Needs Reinforcement    Education Comments  No comments found.

## 2024-12-08 NOTE — CARE PLAN
The patient's goals for the shift include      The clinical goals for the shift include Patient will maintain SPO2 >90% this shift      Problem: Nutrition  Goal: Adequate PO fluid intake  12/8/2024 1745 by Elsa Youssef RN  Outcome: Progressing  12/8/2024 1742 by Elsa Youssef RN  Outcome: Progressing  Goal: Nutrition support goals are met within 48 hrs  12/8/2024 1745 by Elsa Youssef RN  Outcome: Progressing  12/8/2024 1742 by Elsa Youssef RN  Outcome: Progressing  Goal: Electrolytes WNL  12/8/2024 1745 by Elsa Youssef RN  Outcome: Progressing  12/8/2024 1742 by Elsa Youssef RN  Outcome: Progressing  Goal: Promote healing  12/8/2024 1745 by Elsa Youssef RN  Outcome: Progressing  12/8/2024 1742 by Elsa Youssef RN  Outcome: Progressing  Goal: Maintain stable weight  12/8/2024 1745 by Elsa Youssef RN  Outcome: Progressing  12/8/2024 1742 by Elsa Youssef RN  Outcome: Progressing     Problem: Pain - Adult  Goal: Verbalizes/displays adequate comfort level or baseline comfort level  12/8/2024 1745 by Elsa Youssef RN  Outcome: Progressing  12/8/2024 1742 by Elsa Youssef RN  Outcome: Progressing     Problem: Safety - Adult  Goal: Free from fall injury  12/8/2024 1745 by Elsa Youssef RN  Outcome: Progressing  12/8/2024 1742 by Elsa Youssef RN  Outcome: Progressing     Problem: Discharge Planning  Goal: Discharge to home or other facility with appropriate resources  12/8/2024 1745 by Elsa Youssef RN  Outcome: Progressing  12/8/2024 1742 by Elsa Youssef RN  Outcome: Progressing     Problem: Chronic Conditions and Co-morbidities  Goal: Patient's chronic conditions and co-morbidity symptoms are monitored and maintained or improved  12/8/2024 1745 by Elsa Youssef RN  Outcome: Progressing  12/8/2024 1742 by Elsa Youssef RN  Outcome: Progressing     Problem: Skin  Goal: Participates in plan/prevention/treatment measures  12/8/2024 1745 by Elsa Youssef RN  Outcome: Progressing  12/8/2024 1742 by Elsa Youssef RN  Outcome: Progressing  Goal: Prevent/manage  excess moisture  12/8/2024 1745 by Elsa Youssef RN  Outcome: Progressing  12/8/2024 1742 by Elsa Youssef RN  Outcome: Progressing  Goal: Prevent/minimize sheer/friction injuries  12/8/2024 1745 by Elsa Youssef RN  Outcome: Progressing  12/8/2024 1742 by Elsa Youssef RN  Outcome: Progressing  Goal: Promote/optimize nutrition  12/8/2024 1745 by Elsa Youssef RN  Outcome: Progressing  12/8/2024 1742 by Elsa Youssef RN  Outcome: Progressing

## 2024-12-09 LAB
ANION GAP SERPL CALC-SCNC: 13 MMOL/L (ref 10–20)
BACTERIA SPEC RESP CULT: NORMAL
BUN SERPL-MCNC: 22 MG/DL (ref 6–23)
CALCIUM SERPL-MCNC: 8.7 MG/DL (ref 8.6–10.3)
CHLORIDE SERPL-SCNC: 96 MMOL/L (ref 98–107)
CO2 SERPL-SCNC: 27 MMOL/L (ref 21–32)
CREAT SERPL-MCNC: 1.14 MG/DL (ref 0.5–1.05)
EGFRCR SERPLBLD CKD-EPI 2021: 45 ML/MIN/1.73M*2
ERYTHROCYTE [DISTWIDTH] IN BLOOD BY AUTOMATED COUNT: 14.6 % (ref 11.5–14.5)
GLUCOSE SERPL-MCNC: 88 MG/DL (ref 74–99)
GRAM STN SPEC: NORMAL
GRAM STN SPEC: NORMAL
HCT VFR BLD AUTO: 33.8 % (ref 36–46)
HGB BLD-MCNC: 11.1 G/DL (ref 12–16)
MCH RBC QN AUTO: 30.2 PG (ref 26–34)
MCHC RBC AUTO-ENTMCNC: 32.8 G/DL (ref 32–36)
MCV RBC AUTO: 92 FL (ref 80–100)
NRBC BLD-RTO: 0 /100 WBCS (ref 0–0)
PLATELET # BLD AUTO: 130 X10*3/UL (ref 150–450)
POTASSIUM SERPL-SCNC: 3.6 MMOL/L (ref 3.5–5.3)
RBC # BLD AUTO: 3.68 X10*6/UL (ref 4–5.2)
SODIUM SERPL-SCNC: 132 MMOL/L (ref 136–145)
WBC # BLD AUTO: 10.1 X10*3/UL (ref 4.4–11.3)

## 2024-12-09 PROCEDURE — 2500000001 HC RX 250 WO HCPCS SELF ADMINISTERED DRUGS (ALT 637 FOR MEDICARE OP): Performed by: NURSE PRACTITIONER

## 2024-12-09 PROCEDURE — 2500000005 HC RX 250 GENERAL PHARMACY W/O HCPCS: Performed by: STUDENT IN AN ORGANIZED HEALTH CARE EDUCATION/TRAINING PROGRAM

## 2024-12-09 PROCEDURE — 85027 COMPLETE CBC AUTOMATED: CPT | Performed by: NURSE PRACTITIONER

## 2024-12-09 PROCEDURE — 80048 BASIC METABOLIC PNL TOTAL CA: CPT | Performed by: NURSE PRACTITIONER

## 2024-12-09 PROCEDURE — 99232 SBSQ HOSP IP/OBS MODERATE 35: CPT | Performed by: NURSE PRACTITIONER

## 2024-12-09 PROCEDURE — 36415 COLL VENOUS BLD VENIPUNCTURE: CPT | Performed by: NURSE PRACTITIONER

## 2024-12-09 PROCEDURE — 2500000004 HC RX 250 GENERAL PHARMACY W/ HCPCS (ALT 636 FOR OP/ED): Performed by: NURSE PRACTITIONER

## 2024-12-09 PROCEDURE — 2500000005 HC RX 250 GENERAL PHARMACY W/O HCPCS: Performed by: NURSE PRACTITIONER

## 2024-12-09 PROCEDURE — 99232 SBSQ HOSP IP/OBS MODERATE 35: CPT | Performed by: CLINICAL NURSE SPECIALIST

## 2024-12-09 PROCEDURE — 2060000001 HC INTERMEDIATE ICU ROOM DAILY

## 2024-12-09 PROCEDURE — 2500000002 HC RX 250 W HCPCS SELF ADMINISTERED DRUGS (ALT 637 FOR MEDICARE OP, ALT 636 FOR OP/ED): Performed by: INTERNAL MEDICINE

## 2024-12-09 PROCEDURE — 94640 AIRWAY INHALATION TREATMENT: CPT

## 2024-12-09 ASSESSMENT — ENCOUNTER SYMPTOMS
FLANK PAIN: 0
BRUISES/BLEEDS EASILY: 0
WOUND: 0
HEMATURIA: 0
BLOOD IN STOOL: 0
FATIGUE: 0
CONSTIPATION: 0
APPETITE CHANGE: 0
DIFFICULTY URINATING: 0
COLOR CHANGE: 0
PALPITATIONS: 0
ABDOMINAL DISTENTION: 0
ADENOPATHY: 0
PHOTOPHOBIA: 0
LIGHT-HEADEDNESS: 0
DIZZINESS: 0
CHILLS: 0
CONFUSION: 0
DYSPHORIC MOOD: 0
FEVER: 0
POLYPHAGIA: 0
BACK PAIN: 0
VOICE CHANGE: 0
DYSURIA: 0
FREQUENCY: 0
WHEEZING: 0
NERVOUS/ANXIOUS: 0
CHEST TIGHTNESS: 0
SLEEP DISTURBANCE: 0
HEADACHES: 0
DIARRHEA: 0
NUMBNESS: 0
POLYDIPSIA: 0
ARTHRALGIAS: 0
NAUSEA: 0
SINUS PAIN: 0
SORE THROAT: 0
TROUBLE SWALLOWING: 0
CHOKING: 0
APNEA: 0
WEAKNESS: 0
EYE PAIN: 0
SEIZURES: 0
MYALGIAS: 0
UNEXPECTED WEIGHT CHANGE: 0
ABDOMINAL PAIN: 0
EYE DISCHARGE: 0
NECK PAIN: 0
EYE ITCHING: 0
COUGH: 1
VOMITING: 0
NECK STIFFNESS: 0
SPEECH DIFFICULTY: 0
TREMORS: 0
HALLUCINATIONS: 0
SHORTNESS OF BREATH: 0

## 2024-12-09 ASSESSMENT — COGNITIVE AND FUNCTIONAL STATUS - GENERAL
WALKING IN HOSPITAL ROOM: A LITTLE
MOBILITY SCORE: 22
DAILY ACTIVITIY SCORE: 21
DAILY ACTIVITIY SCORE: 24
WALKING IN HOSPITAL ROOM: A LITTLE
HELP NEEDED FOR BATHING: A LITTLE
CLIMB 3 TO 5 STEPS WITH RAILING: A LITTLE
CLIMB 3 TO 5 STEPS WITH RAILING: A LITTLE
STANDING UP FROM CHAIR USING ARMS: A LITTLE
TOILETING: A LITTLE
DRESSING REGULAR LOWER BODY CLOTHING: A LITTLE
MOBILITY SCORE: 21

## 2024-12-09 ASSESSMENT — PAIN - FUNCTIONAL ASSESSMENT
PAIN_FUNCTIONAL_ASSESSMENT: 0-10
PAIN_FUNCTIONAL_ASSESSMENT: 0-10

## 2024-12-09 ASSESSMENT — PAIN SCALES - GENERAL
PAINLEVEL_OUTOF10: 0 - NO PAIN
PAINLEVEL_OUTOF10: 0 - NO PAIN

## 2024-12-09 ASSESSMENT — ACTIVITIES OF DAILY LIVING (ADL): LACK_OF_TRANSPORTATION: NO

## 2024-12-09 NOTE — PROGRESS NOTES
Linda Roberto is a 89 y.o. female on day 2 of admission presenting with Acute respiratory failure with hypoxia (Multi).      Subjective   Linda Roberto is a 89 y.o. female with PMHx of atrial fib on apixaban, HTN, mitral valve insufficiency and chronic bilateral leg edema who presented with cough and SOB for three days. O2 sats in triage were 80-84% on RA.  Family at bedside assist with HPI and say she has had dyspnea and moist cough, lives alone.  She was initially in afib RVR which has resolved. She is not on home O2. She says her leg edema is no worse recently. She has not slept upright since September due to chest pressure, which she denies otherwise. In the ED her clinical picture was concerning for pneumonia as well as pulmonary edema and she was started on IV Abx and placed on oxygen.  Remainder of ROS reviewed and negative except as indicated in HPI.      ED Course:  Vitals on presentation: T 36.8C>38.2C P 104>126 RR 20>32 /83 O2 82/RA  Remarkable labs: creatinine 1.25, , WBC 13.8k, lactate 1.5  EKG: atrial fibrillation with RVR. No STEMI. Normal axis.   Imaging: CXR: Small right pleural effusion   Interventions: Blood cultures, doxycycline, ceftriaxone, IV Lasix, IV magnesium    12/09/24: Patient seen by cardiology  Echo showing normal LV function no significant valvular disease patient continued on diuretics she continues to complain of ongoing cough.  She has been afebrile there is no leukocytosis.  A CT scan of the chest was done there is evidence of small to moderate right pleural effusion and right middle lobe atelectasis and bilateral lower lobe atelectasis.    Review of Systems   Constitutional:  Negative for appetite change, chills, fatigue, fever and unexpected weight change.   HENT:  Negative for congestion, ear discharge, ear pain, mouth sores, nosebleeds, sinus pain, sore throat, trouble swallowing and voice change.    Eyes:  Negative for photophobia, pain, discharge, itching and  visual disturbance.   Respiratory:  Positive for cough. Negative for apnea, choking, chest tightness, shortness of breath and wheezing.    Cardiovascular:  Negative for chest pain, palpitations and leg swelling.   Gastrointestinal:  Negative for abdominal distention, abdominal pain, blood in stool, constipation, diarrhea, nausea and vomiting.   Endocrine: Negative for cold intolerance, heat intolerance, polydipsia, polyphagia and polyuria.   Genitourinary:  Negative for decreased urine volume, difficulty urinating, dysuria, flank pain, frequency, hematuria and urgency.   Musculoskeletal:  Negative for arthralgias, back pain, gait problem, myalgias, neck pain and neck stiffness.   Skin:  Negative for color change, pallor and wound.   Allergic/Immunologic: Negative for food allergies and immunocompromised state.   Neurological:  Negative for dizziness, tremors, seizures, syncope, speech difficulty, weakness, light-headedness, numbness and headaches.   Hematological:  Negative for adenopathy. Does not bruise/bleed easily.   Psychiatric/Behavioral:  Negative for confusion, dysphoric mood, hallucinations, sleep disturbance and suicidal ideas. The patient is not nervous/anxious.           Objective     Last Recorded Vitals  /56 (BP Location: Right arm, Patient Position: Lying)   Pulse 71   Temp 36.7 °C (98.1 °F) (Temporal)   Resp 16   Wt 56 kg (123 lb 7.3 oz)   SpO2 97%     Image Results  ECG 12 lead    Result Date: 12/9/2024  Atrial fibrillation Ventricular premature complex Borderline low voltage, extremity leads    CT chest w IV contrast    Result Date: 12/9/2024  Interpreted By:  Ella Samuels, STUDY: CT CHEST W IV CONTRAST;  12/8/2024 9:02 pm   INDICATION: Signs/Symptoms:Shortness of breath.     COMPARISON: None.   ACCESSION NUMBER(S): [id number]   ORDERING CLINICIAN: JULITA CARTER   TECHNIQUE: Helical data acquisition of the chest was obtained following intravenous administration of 50 ML Omnipaque  350.  Images were reformatted in axial, coronal, and sagittal planes.   FINDINGS: LUNGS AND AIRWAYS: The trachea and central airways are patent. No endobronchial lesion is seen. Bilateral lower lobe peripheral endobronchial mucous plugs are noted with the associated peribronchial inflammation.   Moderate atelectasis is present in the right middle lobe. There is a small to moderate right pleural effusion with the accompanying right lower lobe atelectasis dependently. Minimal atelectasis is noted left lung base. 1.2 cm densely calcified nodule is present medial right lung base (image 220 of 319 feces.     MEDIASTINUM AND JASWINDER, LOWER NECK AND AXILLA: The visualized thyroid gland is within normal limits. 1 cm right paratracheal lymph node is noted. A few subcentimeter mediastinal lymph nodes are also visible. Calcified lymph nodes are noted subcarinal region and lower right hilum Esophagus appears within normal limits as seen.   HEART AND VESSELS: The thoracic aorta normal in course and caliber. Main pulmonary artery and its branches are normal in caliber. No coronary artery calcifications are seen. Please note, the study is not optimized for evaluation of coronary arteries. The cardiac chambers are not enlarged. There is no pericardial effusion seen.   UPPER ABDOMEN: 2.5 cm cyst is noted upper left kidney       CHEST WALL AND OSSEOUS STRUCTURES: Chest wall is within normal limits. No acute osseous pathology.There are no suspicious osseous lesions.       1.  Small-to-moderate right pleural effusion, moderate right middle lobe atelectasis and mild lower lobe atelectasis bilaterally   Signed by: Ella Samuels 12/9/2024 7:31 AM Dictation workstation:   [id number]    Transthoracic Echo (TTE) Complete    Result Date: 12/7/2024              [place] [id number]      Phone [phone] Fax [phone] TRANSTHORACIC ECHOCARDIOGRAM REPORT Patient Name:       ALAINA SIMON          Reading Physician:    [id number] Sunil Woods MD Study Date:         12/7/2024            Ordering Provider:    [id number] DOMINICK HEATON MRN/PID:            [id number]             Fellow: Accession#:         [id number]         Nurse:                ER Nurse Date of Birth/Age:  7/9/1932 / 89 years  Sonographer:          Tess Crowder RDCS Gender Assigned at  F                    Additional Staff: Birth: Height:             154.94 cm            Admit Date:           12/7/2024 Weight:             56.70 kg             Admission Status:     Inpatient -                                                                Routine BSA / BMI:          1.55 m2 / 23.62      Department Location:  [facility]                     kg/m2 Blood Pressure: 143 /91 mmHg Study Type:    TRANSTHORACIC ECHO (TTE) COMPLETE Diagnosis/ICD: Acute respiratory failure with hypoxia-J96.01 Indication:    CHF, SOB CPT Codes:     Echo Complete w Full Doppler-[id number] Patient History: Pertinent History: CHF. Study Detail: The following Echo studies were performed: 2D, Doppler, M-Mode and               color flow. Technically challenging study due to prominent lung               artifact, poor acoustic windows, patient lying in supine position               and small rib spaces. Optison used as a contrast agent for               endocardial border definition. Total contrast used for this               procedure was 3 mL via IV push.  PHYSICIAN INTERPRETATION: Left Ventricle: Left ventricular ejection fraction is normal, calculated by Alfred's biplane at 70%. There are no regional wall motion abnormalities. The left ventricular cavity size is normal. There is mild increased septal and mildly increased posterior left ventricular wall thickness. There is left ventricular concentric  remodeling. Left ventricular diastolic filling was indeterminate. Left Atrium: The left atrium is moderately dilated. Right Ventricle: The right ventricle is normal in size. There is severely reduced right ventricular systolic function. Right Atrium: The right atrium is normal in size. Aortic Valve: The aortic valve is trileaflet. The aortic valve dimensionless index is 0.52. There is no evidence of aortic valve regurgitation. The peak instantaneous gradient of the aortic valve is 3 mmHg. The mean gradient of the aortic valve is 1 mmHg. Mitral Valve: The mitral valve is normal in structure. There is trace mitral valve regurgitation. Tricuspid Valve: The tricuspid valve is structurally normal. There is mild to moderate tricuspid regurgitation. Pulmonic Valve: The pulmonic valve is structurally normal. There is no indication of pulmonic valve regurgitation. Pericardium: Trivial pericardial effusion. Aorta: The aortic root is normal.  CONCLUSIONS:  1. Left ventricular ejection fraction is normal, calculated by Alfred's biplane at 70%.  2. Left ventricular diastolic filling was indeterminate.  3. There is severely reduced right ventricular systolic function.  4. The left atrium is moderately dilated.  5. Mild to moderate tricuspid regurgitation. QUANTITATIVE DATA SUMMARY:  2D MEASUREMENTS:           Normal Ranges: Ao Root d:       2.70 cm   (2.0-3.7cm) LAs:             4.20 cm   (2.7-4.0cm) IVSd:            0.96 cm   (0.6-1.1cm) LVPWd:           1.11 cm   (0.6-1.1cm) LVIDd:           3.37 cm   (3.9-5.9cm) LVIDs:           2.62 cm LV Mass Index:   66.4 g/m2 LV % FS          22.3 %  LA VOLUME:                    Normal Ranges: LA Vol A4C:        64.0 ml    (22+/-6mL/m2) LA Vol A2C:        75.7 ml LA Vol BP:         75.7 ml LA Vol Index A4C:  41.4ml/m2 LA Vol Index A2C:  48.9 ml/m2 LA Vol Index BP:   48.9 ml/m2 LA Area A4C:       23.4 cm2 LA Area A2C:       23.4 cm2 LA Major Axis A4C: 7.3 cm LA Major Axis A2C: 6.2 cm  RA  VOLUME BY A/L METHOD:          Normal Ranges: RA Area A4C:             17.1 cm2  AORTA MEASUREMENTS:         Normal Ranges: Ao Sinus, d:        2.70 cm (2.1-3.5cm) Asc Ao, d:          3.40 cm (2.1-3.4cm)  LV SYSTOLIC FUNCTION BY 2D PLANIMETRY (MOD):                      Normal Ranges: EF-A4C View:    70 % (>=55%) EF-A2C View:    72 % EF-Biplane:     70 % LV EF Reported: 70 %  LV DIASTOLIC FUNCTION:           Normal Ranges: MV Peak E:             0.88 m/s  (0.7-1.2 m/s) MV e'                  0.128 m/s (>8.0) MV lateral e'          0.13 m/s MV medial e'           0.13 m/s E/e' Ratio:            6.87      (<8.0)  MITRAL VALVE:          Normal Ranges: MV DT:        162 msec (150-240msec)  AORTIC VALVE:                     Normal Ranges: AoV Vmax:                0.80 m/s (<=1.7m/s) AoV Peak P.6 mmHg (<20mmHg) AoV Mean P.0 mmHg (1.7-11.5mmHg) LVOT Max Festus:            0.43 m/s (<=1.1m/s) AoV VTI:                 15.30 cm (18-25cm) LVOT VTI:                7.89 cm LVOT Diameter:           2.00 cm  (1.8-2.4cm) AoV Area, VTI:           1.62 cm2 (2.5-5.5cm2) AoV Area,Vmax:           1.67 cm2 (2.5-4.5cm2) AoV Dimensionless Index: 0.52  RIGHT VENTRICLE: RV Basal 2.97 cm RV Mid   3.09 cm RV Major 5.7 cm TAPSE:   5.1 mm RV s'    0.06 m/s  TRICUSPID VALVE/RVSP:          Normal Ranges: Peak TR Velocity:     3.64 m/s RV Syst Pressure:     61 mmHg  (< 30mmHg) IVC Diam:             1.80 cm  [id number] Sunil Woosd MD Electronically signed on 2024 at 4:03:31 PM  ** Final **     XR chest 2 views    Result Date: 2024  Interpreted By:  Chelly Marquez, STUDY: XR CHEST 2 VIEWS;  2024 8:31 am   INDICATION: Signs/Symptoms:cough, hypoxia.   COMPARISON: None.   ACCESSION NUMBER(S): [id number]   ORDERING CLINICIAN: RTUDY TODD   FINDINGS: CARDIOMEDIASTINAL SILHOUETTE: The heart is enlarged.     LUNGS: There is a small right pleural effusion.   ABDOMEN: No remarkable upper abdominal findings.      BONES: No acute osseous changes.       Small right pleural effusion.   MACRO: None   Signed by: Chelly Marquez 12/7/2024 9:38 AM Dictation workstation:   [id number]       Lab Results  Results for orders placed or performed during the hospital encounter of 12/07/24 (from the past 24 hours)   POCT GLUCOSE   Result Value Ref Range    POCT Glucose 98 74 - 99 mg/dL   CBC   Result Value Ref Range    WBC 10.1 4.4 - 11.3 x10*3/uL    nRBC 0.0 0.0 - 0.0 /100 WBCs    RBC 3.68 (L) 4.00 - 5.20 x10*6/uL    Hemoglobin 11.1 (L) 12.0 - 16.0 g/dL    Hematocrit 33.8 (L) 36.0 - 46.0 %    MCV 92 80 - 100 fL    MCH 30.2 26.0 - 34.0 pg    MCHC 32.8 32.0 - 36.0 g/dL    RDW 14.6 (H) 11.5 - 14.5 %    Platelets 130 (L) 150 - 450 x10*3/uL   Basic Metabolic Panel   Result Value Ref Range    Glucose 88 74 - 99 mg/dL    Sodium 132 (L) 136 - 145 mmol/L    Potassium 3.6 3.5 - 5.3 mmol/L    Chloride 96 (L) 98 - 107 mmol/L    Bicarbonate 27 21 - 32 mmol/L    Anion Gap 13 10 - 20 mmol/L    Urea Nitrogen 22 6 - 23 mg/dL    Creatinine 1.14 (H) 0.50 - 1.05 mg/dL    eGFR 45 (L) >60 mL/min/1.73m*2    Calcium 8.7 8.6 - 10.3 mg/dL        Medications  Scheduled medications:  apixaban, 2.5 mg, oral, BID  aspirin, 81 mg, oral, Daily  cefTRIAXone, 1 g, intravenous, q24h  doxycycline, 100 mg, intravenous, q12h  furosemide, 40 mg, intravenous, Daily  guaiFENesin, 600 mg, oral, BID  ipratropium-albuteroL, 3 mL, nebulization, TID  metoprolol tartrate, 50 mg, oral, BID  oxygen, , inhalation, Continuous - Inhalation  perflutren lipid microspheres, 0.5-10 mL of dilution, intravenous, Once in imaging  perflutren lipid microspheres, 0.5-10 mL of dilution, intravenous, Once in imaging  polyethylene glycol, 17 g, oral, Daily  sulfur hexafluoride microsphr, 2 mL, intravenous, Once in imaging      Continuous medications:     PRN medications:  PRN medications: acetaminophen **OR** acetaminophen **OR** acetaminophen, ipratropium-albuteroL     Physical Exam  Vitals reviewed.    Constitutional:       General: She is not in acute distress.  HENT:      Head: Normocephalic and atraumatic.      Right Ear: External ear normal.      Left Ear: External ear normal.      Nose: Nose normal.      Mouth/Throat:      Mouth: Mucous membranes are moist.      Pharynx: Oropharynx is clear.   Eyes:      General: No scleral icterus.     Extraocular Movements: Extraocular movements intact.      Conjunctiva/sclera: Conjunctivae normal.      Pupils: Pupils are equal, round, and reactive to light.   Cardiovascular:      Rate and Rhythm: Normal rate and regular rhythm.      Pulses: Normal pulses.      Heart sounds: Normal heart sounds.   Pulmonary:      Effort: Pulmonary effort is normal. No respiratory distress.      Breath sounds: Wheezing and rhonchi present. No rales.   Chest:      Chest wall: No tenderness.   Abdominal:      General: Bowel sounds are normal. There is no distension.      Palpations: Abdomen is soft. There is no mass.      Tenderness: There is no abdominal tenderness. There is no rebound.   Musculoskeletal:         General: No swelling or deformity. Normal range of motion.      Cervical back: Normal range of motion.      Right lower leg: No edema.      Left lower leg: No edema.   Skin:     General: Skin is warm and dry.      Capillary Refill: Capillary refill takes less than 2 seconds.   Neurological:      General: No focal deficit present.      Mental Status: She is alert and oriented to person, place, and time.   Psychiatric:         Mood and Affect: Mood normal.         Behavior: Behavior normal.                  Assessment/Plan   This patient currently has cardiac telemetry ordered; if you would like to modify or discontinue the telemetry order, click here to go to the orders activity to modify/discontinue the order.    Acute respiratory failure with hypoxia  Continue  empiric doxycycline and ceftriaxone, continue Mucinex and Duoneb, sputum cx pending  CT of chest showing small to  moderate right-sided effusion bilateral lower lobe endobronchial mucous plugs  I educated pt on incentive spirometry 10x/hour  Will add flutter valve therapy to improve expectoration  Wean O2 off as tolerated to maintain O2 sat >91%, pt may need home O2 eval      Hx chronic leg edema, probable diastolic heart failure  Cardiology consulted, pt follows with them for edema  AMANDA 9/2/2022 revealed moderate LLE arterial occlusive disease significant stenosis and/or occlusion at the left supra-popliteal artery  Venous insufficiency reflux ultrasound 7/22/2022 showed bilateral saphenofemoral vein reflux  Echo 4/20/2022 showed normal LV systolic function with EF of 70% and severely reduced RVSP  Repeat echo revealed reduced RV function, normal EF  Continue IV Lasix daily for now     Atrial fib with RVR  Spontaneously converted in ED, now rate controlled  Continue metoprolol and apixaban     CKD, at baseline     DVT ppx: apixaban               Code Status: Full Code          DVT ppx:      Please see orders for more complete plan    Prateek Quintanilla, APRN-CNP

## 2024-12-09 NOTE — PROGRESS NOTES
12/09/24 1351   Discharge Planning   Living Arrangements Alone   Support Systems Children   Assistance Needed in the PM   Type of Residence Private residence   Number of Stairs Within Residence 7   Home or Post Acute Services None   Expected Discharge Disposition Home   Housing Stability   At any time in the past 12 months, were you homeless or living in a shelter (including now)? N   Transportation Needs   In the past 12 months, has lack of transportation kept you from meetings, work, or from getting things needed for daily living? No   Intensity of Service   Intensity of Service 0-30 min     I spoke with patient to introduce discharge planning and explain role of TCC. Pt states she lives alone in her single story home.  She tells me that her son picks her up in late afternoon and she stays with him over night and he takes her back home in the morning.  She states she does not use any DME but does have a walker.  She denies falls, her son does the driving and shopping. She is able to cook for herself and perform personal care.  She also does her own laundry which is in the basement.  She confirmed her PCP is Dr. Avina.  She is currently on 1 l NC which she does not use at home.  PT [facility] is 21.  She is expecting to return home on discharge. She may need O2 eval if not able to wean off.  Will follow.

## 2024-12-09 NOTE — CONSULTS
Nutrition Initial Assessment:   Nutrition Assessment    Reason for Assessment: Admission nursing screening    Medical history per chart:   atrial fib on apixaban, HTN, mitral valve insufficiency and chronic bilateral leg edema     HPI:  Patient presents with cough and shortness of breath    12/9:  Patient awake, alert at time of visit.  Patient reports variable appetite/meal intake, reports currently just wanting mainly fruit/lite type foods.  Reviewed need for adequate protein intake, patient reports liking all meats with good intake at home, declined need for supplements at this time.  Patient does endorse a weight loss over the past 5 years but reports this as gradual weight loss over the long term, significant recent weight loss indicated per available history.  Meal intake encouraged, will monitor and follow per protocol.        Current Diet: Adult diet Regular  Supplement(s): No  Average meal Intake during admission:  %, variable        Nutrition Related Findings:   Oral Symptoms: none     GI symptoms: no GI issues at this time.   BM:    Food allergies: NKFA. is allergic to lisinopril and penicillins.  Meds/Labs reviewed.  apixaban, 2.5 mg, oral, BID  aspirin, 81 mg, oral, Daily  cefTRIAXone, 1 g, intravenous, q24h  doxycycline, 100 mg, intravenous, q12h  furosemide, 40 mg, intravenous, Daily  guaiFENesin, 600 mg, oral, BID  ipratropium-albuteroL, 3 mL, nebulization, TID  metoprolol tartrate, 50 mg, oral, BID  oxygen, , inhalation, Continuous - Inhalation  perflutren lipid microspheres, 0.5-10 mL of dilution, intravenous, Once in imaging  perflutren lipid microspheres, 0.5-10 mL of dilution, intravenous, Once in imaging  polyethylene glycol, 17 g, oral, Daily  sulfur hexafluoride microsphr, 2 mL, intravenous, Once in imaging             Nutrition Significant Labs:    Results from last 7 days   Lab Units 12/09/24  0353 12/08/24  0521 12/07/24  0759   GLUCOSE mg/dL 88 72* 107*   SODIUM mmol/L 132* 133*  "133*   POTASSIUM mmol/L 3.6 3.5 3.7   CHLORIDE mmol/L 96* 100 92*   CO2 mmol/L 27 27 30   BUN mg/dL 22 16 17   CREATININE mg/dL 1.14* 0.87 1.25*   EGFR mL/min/1.73m*2 45* 63 41*   CALCIUM mg/dL 8.7 6.7* 9.4     Lab Results   Component Value Date    HGBA1C 5.8 08/17/2020     Results from last 7 days   Lab Units 12/08/24  1923   POCT GLUCOSE mg/dL 98       Anthropometrics:  Height: 154.9 cm (5' 1\")   Weight: 56 kg (123 lb 7.3 oz)   BMI (Calculated): 23.34  IBW/kg (Dietitian Calculated): 47.7 kg         Weight History:   Wt Readings from Last 10 Encounters:   12/09/24 56 kg (123 lb 7.3 oz)   11/04/24 57.6 kg (127 lb)   09/20/24 64 kg (141 lb)   06/13/24 66.2 kg (146 lb)   05/02/24 64.4 kg (142 lb)   11/01/23 67.6 kg (149 lb)   05/01/23 70.3 kg (155 lb)   03/06/23 70.1 kg (154 lb 8 oz)   11/28/22 70.3 kg (155 lb)   09/09/22 70.9 kg (156 lb 6 oz)        Weight Change %:  Weight History / % Weight Change: Potential 12.7% weight loss x 3 months per available weight history.  Patient reports gradual weight loss over the past 5 years, denies significant recent changes, weight at one time was over 200 lbs.  Significant Weight Loss: Yes  Interpretation of Weight Loss: >7.5% in 3 months       Nutrition Focused Physical Exam Findings:    Subcutaneous Fat Loss:   Orbital Fat Pads: Mild-Moderate (slight dark circles and slight hollowing)  Buccal Fat Pads: Mild-Moderate (flat cheeks, minimal bounce)  Triceps: Severe (negligible fat tissue)  Ribs: Defer  Muscle Wasting:  Temporalis: Mild-Moderate (slight depression)  Pectoralis (Clavicular Region): Severe (protruding prominent clavicle)  Deltoid/Trapezius: Severe (squared shoulders, acromion process prominent)  Interosseous: Severe (depressed area between thumb and forefinger)  Trapezius/Infraspinatus/Supraspinatus (Scapular Region): Defer  Quadriceps: Defer  Gastrocnemius: Defer  Edema:  Edema: +2 mild  Edema Location: BLE  Physical Findings:  Skin: Positive (PI to sacrum noted - " no staging)    Estimated Needs:   Total Energy Estimated Needs (kCal):  (4640-3472)  Method for Estimating Needs: 30, CBW  Total Protein Estimated Needs (g):  (65-70)  Method for Estimating Needs: 1.2, CBW     Method for Estimating Needs: per physician        Nutrition Diagnosis   Nutrition Diagnosis:  Malnutrition Diagnosis  Patient has Malnutrition Diagnosis: Yes  Diagnosis Status: New  Malnutrition Diagnosis: Moderate malnutrition related to chronic disease or condition  As Evidenced by: moderate/severe muscle and fat loss, predicted intake <75% of estimated needs, 12.7% weight loss x 3 months  Additional Assessment Information: advanced age            Nutrition Interventions/Recommendations   Nutrition Interventions and Recommendations:        Nutrition Prescription:  Individualized Nutrition Prescription Provided for : Moderate malnutrition indicated        Nutrition Interventions:   Food and/or Nutrient Delivery Interventions  Interventions: Meals and snacks  Meals and Snacks: General healthful diet  Goal: >75% intake of meals         Nutrition Education:   Education Documentation  No documentation found.      Nutrition Counseling  Counseling Theoretical Approach: Other (Comment)  Goal: Reviewed supplement options, meal/protein intake encouraged       Nutrition Monitoring and Evaluation   Monitoring/Evaluation:   Food/Nutrient Related History Monitoring  Monitoring and Evaluation Plan: Energy intake  Energy Intake: Estimated energy intake  Criteria: meet >75% of estimated needs    Body Composition/Growth/Weight History  Monitoring and Evaluation Plan: Weight  Weight: Weight change  Criteria: Maintain stable, dry weight    Biochemical Data, Medical Tests and Procedures  Monitoring and Evaluation Plan: Electrolyte/renal panel, Glucose/endocrine profile  Electrolyte and Renal Panel: BUN, Creatinine, Magnesium, Phosphorus, Potassium, Sodium  Criteria: WNL  Glucose/Endocrine Profile: Glucose, casual  Criteria:  WNL    Nutrition Focused Physical Findings  Monitoring and Evaluation Plan: Skin  Skin: Impaired wound healing  Criteria: Promote healing            Time Spent/Follow-up Reminder:   Follow Up  Time Spent (min): 45 minutes  Last Date of Nutrition Visit: 12/09/24  Nutrition Follow-Up Needed?: Dietitian to reassess per policy  Follow up Comment: 12/12-12/13

## 2024-12-09 NOTE — CARE PLAN
The patient's goals for the shift include      The clinical goals for the shift include SPO2 >88%      Problem: Nutrition  Goal: Adequate PO fluid intake  Outcome: Progressing  Goal: Nutrition support goals are met within 48 hrs  Outcome: Progressing  Goal: Electrolytes WNL  Outcome: Progressing  Goal: Promote healing  Outcome: Progressing  Goal: Maintain stable weight  Outcome: Progressing     Problem: Pain - Adult  Goal: Verbalizes/displays adequate comfort level or baseline comfort level  Outcome: Progressing     Problem: Safety - Adult  Goal: Free from fall injury  Outcome: Progressing     Problem: Discharge Planning  Goal: Discharge to home or other facility with appropriate resources  Outcome: Progressing     Problem: Chronic Conditions and Co-morbidities  Goal: Patient's chronic conditions and co-morbidity symptoms are monitored and maintained or improved  Outcome: Progressing     Problem: Skin  Goal: Participates in plan/prevention/treatment measures  Outcome: Progressing  Flowsheets (Taken 12/9/2024 1749)  Participates in plan/prevention/treatment measures: Elevate heels  Goal: Prevent/manage excess moisture  Outcome: Progressing  Goal: Prevent/minimize sheer/friction injuries  Outcome: Progressing  Goal: Promote/optimize nutrition  Outcome: Progressing

## 2024-12-09 NOTE — PROGRESS NOTES
"Subjective Data:  Today, Ms. Roberto is sitting at side of bed, daughter-in-law at bedside. Reports she continues to have shortness of breath that is unchanged, no chest pain but endorses chronic chest heaviness that has been ongoing for \"months\", no dizziness or lightheadedness. Monitor demonstrates AF with rates in the 90s.     Overnight Events:    None     Objective Data:  Last Recorded Vitals:  Vitals:    12/09/24 0043 12/09/24 0501 12/09/24 0732 12/09/24 0758   BP: 101/56 100/63  120/66   BP Location: Left arm Left arm  Right arm   Patient Position: Lying Lying  Lying   Pulse: 84 86  96   Resp: 18 18  18   Temp: 36.9 °C (98.5 °F) 37.1 °C (98.8 °F)  37.1 °C (98.8 °F)   TempSrc: Temporal Temporal  Temporal   SpO2: 94% 95% 99% 95%   Weight:  56 kg (123 lb 7.3 oz)     Height:           Last Labs:  CBC - 12/9/2024:  3:53 AM  10.1 11.1 130    33.8      CMP - 12/9/2024:  3:53 AM  8.7 7.5 19 --- 1.2   _ 4.1 11 51      PTT - No results in last year.  _   _ _     TROPHS   Date/Time Value Ref Range Status   12/07/2024 08:48 AM 18 0 - 13 ng/L Final   12/07/2024 07:59 AM 18 0 - 13 ng/L Final     BNP   Date/Time Value Ref Range Status   12/07/2024 07:59  0 - 99 pg/mL Final     HGBA1C   Date/Time Value Ref Range Status   08/17/2020 09:06 AM 5.8 % Final     Comment:          Diagnosis of Diabetes-Adults   Non-Diabetic: < or = 5.6%   Increased risk for developing diabetes: 5.7-6.4%   Diagnostic of diabetes: > or = 6.5%  .       Monitoring of Diabetes                Age (y)     Therapeutic Goal (%)   Adults:          >18           <7.0   Pediatrics:    13-18           <7.5                   7-12           <8.0                   0- 6            7.5-8.5   American Diabetes Association. Diabetes Care 33(S1), Jan 2010.       LDLCALC   Date/Time Value Ref Range Status   04/30/2024 07:32  <=99 mg/dL Final     Comment:                                 Near   Borderline      AGE      Desirable  Optimal    High     High     Very " High     0-19 Y     0 - 109     ---    110-129   >/= 130     ----    20-24 Y     0 - 119     ---    120-159   >/= 160     ----      >24 Y     0 -  99   100-129  130-159   160-189     >/=190       VLDL   Date/Time Value Ref Range Status   04/30/2024 07:32 AM 16 0 - 40 mg/dL Final   05/01/2023 08:40 AM 19 0 - 40 mg/dL Final      Last I/O:  No intake/output data recorded.    Past Cardiology Tests (Last 3 Years):  EKG:  ECG 12 lead 12/07/2024 (Preliminary)      ECG 12 Lead 09/20/2024    Echo:  Transthoracic Echo (TTE) Complete 12/07/2024    Ejection Fractions:  EF   Date/Time Value Ref Range Status   12/07/2024 11:22 AM 70 %      Cath:  No results found for this or any previous visit from the past 1095 days.    Stress Test:  No results found for this or any previous visit from the past 1095 days.    Cardiac Imaging:  No results found for this or any previous visit from the past 1095 days.      Inpatient Medications:  Scheduled medications   Medication Dose Route Frequency    apixaban  2.5 mg oral BID    aspirin  81 mg oral Daily    cefTRIAXone  1 g intravenous q24h    doxycycline  100 mg intravenous q12h    furosemide  40 mg intravenous Daily    guaiFENesin  600 mg oral BID    ipratropium-albuteroL  3 mL nebulization TID    metoprolol tartrate  50 mg oral BID    oxygen   inhalation Continuous - Inhalation    perflutren lipid microspheres  0.5-10 mL of dilution intravenous Once in imaging    perflutren lipid microspheres  0.5-10 mL of dilution intravenous Once in imaging    polyethylene glycol  17 g oral Daily    sulfur hexafluoride microsphr  2 mL intravenous Once in imaging     PRN medications   Medication    acetaminophen    Or    acetaminophen    Or    acetaminophen    ipratropium-albuteroL     Continuous Medications   Medication Dose Last Rate       Physical Exam:  Physical Exam  Vitals reviewed.   Constitutional:       Appearance: Normal appearance.   HENT:      Head: Normocephalic.      Mouth/Throat:      Mouth:  Mucous membranes are moist.   Cardiovascular:      Rate and Rhythm: Normal rate. Rhythm irregularly irregular.      Pulses: Normal pulses.      Heart sounds: Normal heart sounds. No murmur heard.     No friction rub. No gallop.   Pulmonary:      Effort: Pulmonary effort is normal.      Breath sounds: Rhonchi and rales (Bilateral lower lobes >Left) present. No wheezing.   Abdominal:      General: Bowel sounds are normal.      Palpations: Abdomen is soft.   Musculoskeletal:         General: Normal range of motion.      Cervical back: Normal range of motion.      Right lower leg: Edema present.      Left lower leg: Edema present.   Skin:     General: Skin is warm and dry.   Neurological:      General: No focal deficit present.      Mental Status: She is alert and oriented to person, place, and time.   Psychiatric:         Mood and Affect: Mood normal.         Behavior: Behavior normal.         Thought Content: Thought content normal.         Judgment: Judgment normal.           Assessment/Plan   1) Shortness of breath  I suspect underlying pulmonary causes along with diastolic dysfunction (? Restrictive Cardiomyopathy of elderly)  On IV diuresis, Metoprolol  Echo with normal LV function and no sig valvular disease    12/9/24: Remains with shortness of breath that she reports is unchanged since admission. CT chest with small-to-moderate right pleural effusion, moderate right middle lobe atelectasis and mild lower lobe atelectasis bilaterally. Recommend to continue on IV diuretic as ordered. Cr worsening today to 1.14 fro 0.87. Recommend pulmonary hygiene per RT discretion. Continue to monitor strict I/O, daily BMP and Mg.      2) Permanent Atrial fib  Rates controlled  On Eliquis    12/9/24: Stable, rate controlled.     3) Acute on  Chronic right heart failure  I suspect due to restrictive CMP and ? Chronic hypoxia from Lungs  Check CT chest to R/O pulmonary pathology  May consider Pulmonary consult    12/9/24: CT as  above, recommend to continue on IV diuretics as ordered.       Code Status:  Full Code    Laurie Chinchilla, APRN-CNP

## 2024-12-09 NOTE — PROGRESS NOTES
Social work consult placed for positive medical risk screen. SW reviewed pt's chart and communicated with T[facility]. No SW needs foreseen at this time. SW signing off; available upon request.    WENDY Wilhelm, LSW ([id number])   Care Transitions

## 2024-12-10 LAB
ANION GAP SERPL CALC-SCNC: 11 MMOL/L (ref 10–20)
BUN SERPL-MCNC: 18 MG/DL (ref 6–23)
CALCIUM SERPL-MCNC: 8.5 MG/DL (ref 8.6–10.3)
CHLORIDE SERPL-SCNC: 97 MMOL/L (ref 98–107)
CO2 SERPL-SCNC: 30 MMOL/L (ref 21–32)
CREAT SERPL-MCNC: 1.01 MG/DL (ref 0.5–1.05)
EGFRCR SERPLBLD CKD-EPI 2021: 52 ML/MIN/1.73M*2
GLUCOSE SERPL-MCNC: 161 MG/DL (ref 74–99)
HOLD SPECIMEN: NORMAL
MAGNESIUM SERPL-MCNC: 1.62 MG/DL (ref 1.6–2.4)
POTASSIUM SERPL-SCNC: 3.3 MMOL/L (ref 3.5–5.3)
SODIUM SERPL-SCNC: 135 MMOL/L (ref 136–145)

## 2024-12-10 PROCEDURE — 2500000004 HC RX 250 GENERAL PHARMACY W/ HCPCS (ALT 636 FOR OP/ED): Performed by: NURSE PRACTITIONER

## 2024-12-10 PROCEDURE — 99232 SBSQ HOSP IP/OBS MODERATE 35: CPT | Performed by: HOSPITALIST

## 2024-12-10 PROCEDURE — 2500000005 HC RX 250 GENERAL PHARMACY W/O HCPCS: Performed by: NURSE PRACTITIONER

## 2024-12-10 PROCEDURE — 36415 COLL VENOUS BLD VENIPUNCTURE: CPT | Performed by: CLINICAL NURSE SPECIALIST

## 2024-12-10 PROCEDURE — 83735 ASSAY OF MAGNESIUM: CPT | Performed by: CLINICAL NURSE SPECIALIST

## 2024-12-10 PROCEDURE — 94640 AIRWAY INHALATION TREATMENT: CPT

## 2024-12-10 PROCEDURE — 2500000002 HC RX 250 W HCPCS SELF ADMINISTERED DRUGS (ALT 637 FOR MEDICARE OP, ALT 636 FOR OP/ED): Performed by: INTERNAL MEDICINE

## 2024-12-10 PROCEDURE — 94669 MECHANICAL CHEST WALL OSCILL: CPT

## 2024-12-10 PROCEDURE — 2500000002 HC RX 250 W HCPCS SELF ADMINISTERED DRUGS (ALT 637 FOR MEDICARE OP, ALT 636 FOR OP/ED): Performed by: CLINICAL NURSE SPECIALIST

## 2024-12-10 PROCEDURE — 2060000001 HC INTERMEDIATE ICU ROOM DAILY

## 2024-12-10 PROCEDURE — 2500000005 HC RX 250 GENERAL PHARMACY W/O HCPCS: Performed by: HOSPITALIST

## 2024-12-10 PROCEDURE — 99232 SBSQ HOSP IP/OBS MODERATE 35: CPT | Performed by: CLINICAL NURSE SPECIALIST

## 2024-12-10 PROCEDURE — 80048 BASIC METABOLIC PNL TOTAL CA: CPT | Performed by: CLINICAL NURSE SPECIALIST

## 2024-12-10 PROCEDURE — 2500000001 HC RX 250 WO HCPCS SELF ADMINISTERED DRUGS (ALT 637 FOR MEDICARE OP): Performed by: NURSE PRACTITIONER

## 2024-12-10 PROCEDURE — 2500000004 HC RX 250 GENERAL PHARMACY W/ HCPCS (ALT 636 FOR OP/ED): Performed by: CLINICAL NURSE SPECIALIST

## 2024-12-10 PROCEDURE — 2500000001 HC RX 250 WO HCPCS SELF ADMINISTERED DRUGS (ALT 637 FOR MEDICARE OP): Performed by: CLINICAL NURSE SPECIALIST

## 2024-12-10 RX ORDER — TALC
3 POWDER (GRAM) TOPICAL DAILY
Status: DISCONTINUED | OUTPATIENT
Start: 2024-12-10 | End: 2024-12-12 | Stop reason: HOSPADM

## 2024-12-10 RX ORDER — POTASSIUM CHLORIDE 20 MEQ/1
40 TABLET, EXTENDED RELEASE ORAL ONCE
Status: COMPLETED | OUTPATIENT
Start: 2024-12-10 | End: 2024-12-10

## 2024-12-10 RX ORDER — LANOLIN ALCOHOL/MO/W.PET/CERES
800 CREAM (GRAM) TOPICAL ONCE
Status: COMPLETED | OUTPATIENT
Start: 2024-12-10 | End: 2024-12-10

## 2024-12-10 ASSESSMENT — COGNITIVE AND FUNCTIONAL STATUS - GENERAL
WALKING IN HOSPITAL ROOM: A LITTLE
DAILY ACTIVITIY SCORE: 24
MOBILITY SCORE: 22
DAILY ACTIVITIY SCORE: 24
CLIMB 3 TO 5 STEPS WITH RAILING: A LITTLE
CLIMB 3 TO 5 STEPS WITH RAILING: A LITTLE
MOBILITY SCORE: 22
WALKING IN HOSPITAL ROOM: A LITTLE

## 2024-12-10 ASSESSMENT — PAIN - FUNCTIONAL ASSESSMENT
PAIN_FUNCTIONAL_ASSESSMENT: 0-10

## 2024-12-10 ASSESSMENT — PAIN SCALES - GENERAL
PAINLEVEL_OUTOF10: 0 - NO PAIN

## 2024-12-10 NOTE — CARE PLAN
Problem: Nutrition  Goal: Adequate PO fluid intake  Outcome: Progressing     Problem: Pain - Adult  Goal: Verbalizes/displays adequate comfort level or baseline comfort level  Outcome: Progressing     Problem: Safety - Adult  Goal: Free from fall injury  Outcome: Progressing     Problem: Chronic Conditions and Co-morbidities  Goal: Patient's chronic conditions and co-morbidity symptoms are monitored and maintained or improved  Outcome: Progressing

## 2024-12-10 NOTE — CARE PLAN
The patient's goals for the shift include      The clinical goals for the shift include SPO2 >88%      Problem: Nutrition  Goal: Adequate PO fluid intake  Outcome: Progressing  Goal: Nutrition support goals are met within 48 hrs  Outcome: Progressing  Goal: Electrolytes WNL  Outcome: Progressing  Goal: Promote healing  Outcome: Progressing  Goal: Maintain stable weight  Outcome: Progressing     Problem: Pain - Adult  Goal: Verbalizes/displays adequate comfort level or baseline comfort level  Outcome: Progressing     Problem: Safety - Adult  Goal: Free from fall injury  Outcome: Progressing     Problem: Discharge Planning  Goal: Discharge to home or other facility with appropriate resources  Outcome: Progressing     Problem: Chronic Conditions and Co-morbidities  Goal: Patient's chronic conditions and co-morbidity symptoms are monitored and maintained or improved  Outcome: Progressing     Problem: Skin  Goal: Participates in plan/prevention/treatment measures  Outcome: Progressing  Goal: Prevent/manage excess moisture  Outcome: Progressing  Goal: Prevent/minimize sheer/friction injuries  Outcome: Progressing  Goal: Promote/optimize nutrition  Outcome: Progressing

## 2024-12-10 NOTE — PROGRESS NOTES
Linda Roberto [id number]   Service: Internal Medicine / Hospitalist Date of service: 12/10/2024                          Full Code                Overnight Events: No new overnight events reported by patient or nursing.     Subjective    Linda Roberto is a 89 y.o. female with PMHx of atrial fib on apixaban, HTN, mitral valve insufficiency and chronic bilateral leg edema who presented with cough and SOB for three days. O2 sats in triage were 80-84% on RA.  Family at bedside assist with HPI and say she has had dyspnea and moist cough, lives alone.  She was initially in afib RVR which has resolved. She is not on home O2. She says her leg edema is no worse recently. She has not slept upright since September due to chest pressure, which she denies otherwise. In the ED her clinical picture was concerning for pneumonia as well as pulmonary edema and she was started on IV Abx and placed on oxygen.  Remainder of ROS reviewed and negative except as indicated in HPI.      ED Course:  Vitals on presentation: T 36.8C>38.2C P 104>126 RR 20>32 /83 O2 82/RA  Remarkable labs: creatinine 1.25, , WBC 13.8k, lactate 1.5  EKG: atrial fibrillation with RVR. No STEMI. Normal axis.   Imaging: CXR: Small right pleural effusion   Interventions: Blood cultures, doxycycline, ceftriaxone, IV Lasix, IV magnesium     12/09/24: Patient seen by cardiology  Echo showing normal LV function no significant valvular disease patient continued on diuretics she continues to complain of ongoing cough.  She has been afebrile there is no leukocytosis.  A CT scan of the chest was done there is evidence of small to moderate  right pleural effusion and right middle lobe atelectasis and bilateral lower lobe atelectasis.       12/11.........Patient reported she was feeling better express her desire to be discharged soon.  No reported ; palpitations, headaches, chest pains, nausea or vomiting.             Review of system otherwise negative if not  aforementioned above in subjective.    Objective              Physical Exam     Constitutional:       Appearance: Patient appeared in no acute cardiopulmonary distress.     Comments: Patient alert and oriented to person place time and situation.  HEENT:      Head: Normocephalic and atraumatic.Trachea midline      Nose:No observed congestion or rhinorrhea.     Mouth/Throat: Mucous membranes Moist, Trachea appeared  midline.  Eyes:      Extraocular Movements: Extraocular movements intact.      Pupils: Pupils are equal, round, and reactive to light.      Comments: No scleral icterus or conjunctival injection appreciated.   Cardiovascular:      Rate and Rhythm: Irregular rate and rhythm.No clicks rubs or gallops.No peripheral stigmata of endocarditis appreciated.     Pulmonary:      Isolated anterior crackles, diminished lung bases posteriorly.  Abdominal:      General: Abdomen soft, nontender, active bowel sounds, no involuntary guarding or rebound tenderness appreciated.     Comments: None   Musculoskeletal:       Patient appeared to have full active range of motion for upper and lower extremities, no acute apparent joint deformity appreciated on examination.   No pitting edema or cyanosis appreciated.       Lymphadenopathy:      No appreciable palpable lymphadenopathy  Skin:     General: Skin is warm.      Coloration:  No jaundice     Findings: No abnormal appearing skin rashes or lesions that appeared acute noted on unclothed area of the skin..   Neurological:      General: No focal sensory or motor deficits appreciated, no meningeal signs or dysmetria noted.      Cranial Nerves: Cranial nerves II to XII appearing grossly intact.     Genitals:  Deferred  Psychiatric:         The patient appears to be displaying normal mood and affect at the time of evaluation.    Labs:     Lab Results   Component Value Date    GLUCOSE 88 12/09/2024    CALCIUM 8.7 12/09/2024     (L) 12/09/2024    K 3.6 12/09/2024    CO2 27  12/09/2024    CL 96 (L) 12/09/2024    BUN 22 12/09/2024    CREATININE 1.14 (H) 12/09/2024      Lab Results   Component Value Date    WBC 10.1 12/09/2024    HGB 11.1 (L) 12/09/2024    HCT 33.8 (L) 12/09/2024    MCV 92 12/09/2024     (L) 12/09/2024      [unfilled]   [unfilled]   Susceptibility data from last 90 days.  Collected Specimen Info Organism   12/07/24 Fluid from SPUTUM Normal throat florin               X-rays/ Images    [unfilled]   Radiology Results (last 21 days)    Procedure Component Value Units Date/Time   CT chest w IV contrast [[id number]] Collected: 12/09/24 0732   Order Status: Completed Updated: 12/09/24 0732   Narrative:     Interpreted By:  Ella Samuels,  STUDY:  CT CHEST W IV CONTRAST;  12/8/2024 9:02 pm      INDICATION:  Signs/Symptoms:Shortness of breath.          COMPARISON:  None.      ACCESSION NUMBER(S):  [id number]      ORDERING CLINICIAN:  JULITA CARTER      TECHNIQUE:  Helical data acquisition of the chest was obtained following  intravenous administration of 50 ML Omnipaque 350.  Images were  reformatted in axial, coronal, and sagittal planes.      FINDINGS:  LUNGS AND AIRWAYS:  The trachea and central airways are patent. No endobronchial lesion  is seen. Bilateral lower lobe peripheral endobronchial mucous plugs  are noted with the associated peribronchial inflammation.      Moderate atelectasis is present in the right middle lobe. There is a  small to moderate right pleural effusion with the accompanying right  lower lobe atelectasis dependently. Minimal atelectasis is noted left  lung base. 1.2 cm densely calcified nodule is present medial right  lung base (image 220 of 319 feces.          MEDIASTINUM AND JASWINDER, LOWER NECK AND AXILLA:  The visualized thyroid gland is within normal limits.  1 cm right paratracheal lymph node is noted. A few subcentimeter  mediastinal lymph nodes are also visible. Calcified lymph nodes are  noted subcarinal region and lower right  hilum Esophagus appears  within normal limits as seen.      HEART AND VESSELS:  The thoracic aorta normal in course and caliber.  Main pulmonary artery and its branches are normal in caliber.  No coronary artery calcifications are seen. Please note, the study is  not optimized for evaluation of coronary arteries. The cardiac  chambers are not enlarged. There is no pericardial effusion seen.      UPPER ABDOMEN:  2.5 cm cyst is noted upper left kidney              CHEST WALL AND OSSEOUS STRUCTURES:  Chest wall is within normal limits.  No acute osseous pathology.There are no suspicious osseous lesions.       Impression:     1.  Small-to-moderate right pleural effusion, moderate right middle  lobe atelectasis and mild lower lobe atelectasis bilaterally      Signed by: Ella Samuels 12/9/2024 7:31 AM  Dictation workstation:   [id number]   XR chest 2 views [[id number]] Collected: 12/07/24 0939   Order Status: Completed Updated: 12/07/24 0939   Narrative:     Interpreted By:  Chelly Marquez,  STUDY:  XR CHEST 2 VIEWS;  12/7/2024 8:31 am      INDICATION:  Signs/Symptoms:cough, hypoxia.      COMPARISON:  None.      ACCESSION NUMBER(S):  [id number]      ORDERING CLINICIAN:  TRUDY TODD      FINDINGS:  CARDIOMEDIASTINAL SILHOUETTE:  The heart is enlarged.          LUNGS:  There is a small right pleural effusion.      ABDOMEN:  No remarkable upper abdominal findings.          BONES:  No acute osseous changes.       Impression:     Small right pleural effusion.      MACRO:  None      Signed by: Chelly Marquez 12/7/2024 9:38 AM  Dictation workstation:   [id number]             Medical Problems       Problem List       * (Principal) Acute respiratory failure with hypoxia (Multi)    Benign essential hypertension (Chronic)    Bilateral lower extremity edema (Chronic)    Chronic constipation (Chronic)    Chronic low back pain    Diverticulosis of colon    Hyperlipidemia    Lumbar radiculopathy    Mitral valve insufficiency     Mild vitamin D deficiency    Venous reflux    Medicare annual wellness visit, subsequent    Abnormal ankle brachial index (AMANDA) (Chronic)    Overview Signed 5/1/2024  4:31 PM by Laurie Avina DO     AMANDA performed 09/02/2022 revealed 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          Alterations of sensations    Fall    Stage 3b chronic kidney disease (Multi) (Chronic)    Claudication of lower extremity (CMS-HCC)    Disorder of skin of upper extremity    History of cataract    Pain of lower extremity    Right foot pain    Urinary tract infection    Atherosclerosis of native artery of both lower extremities (CMS-HCC)    Onychomycosis               Above medical problems may be reflective of historical medical problems that may have resolved and may not related to acute clinical condition/medical problems.    Clinical impression/plan:      Acute respiratory failure with hypoxia  Continue  empiric doxycycline and ceftriaxone, continue Mucinex and Duoneb, sputum cx pending  CT of chest showing small to moderate right-sided effusion bilateral lower lobe endobronchial mucous plugs  I educated pt on incentive spirometry 10x/hour  Will add flutter valve therapy to improve expectoration  Wean O2 off as tolerated to maintain O2 sat >91%, pt may need home O2 eval      Hx chronic leg edema, probable diastolic heart failure  Cardiology consulted, pt follows with them for edema  AMANDA 9/2/2022 revealed moderate LLE arterial occlusive disease significant stenosis and/or occlusion at the left supra-popliteal artery  Venous insufficiency reflux ultrasound 7/22/2022 showed bilateral saphenofemoral vein reflux  Echo 4/20/2022 showed normal LV systolic function with EF of 70% and severely reduced RVSP  Repeat echo revealed reduced RV function, normal EF  Continue IV Lasix daily for  now    Hypokalemia  -Supplement potassium     Atrial fib with RVR  Spontaneously converted in ED, now rate controlled  Continue metoprolol and apixaban     CKD, at baseline     DVT ppx: apixaban        Code Status: Full Code       Disposition/additional care plan/interventions: 12/10/2024    As needed melatonin for insomnia.    Agree with flutter valve    Continue monitor respiratory status closely    IV diuresis duration as per cardiology    Hypokalemia.............. supplement potassium    Monitor surrogate magnesium levels    2D echocardiogram reviewed, EF preserved, severely reduced right ventricular systolic function    Permanent A-fib with acceptable ventricular response, continue rate control therapy and anticoagulation with Eliquis          The patient was informed of differential diagnosis , work up , plan of care and possible sequelae of clinical disposition.Patient in agreement with plan of care. Further recommendations forthcoming in accordance with patient's clinical disposition and response to care.    Discharge planning:Discharge timing in accordance with recommendation by cardiology, continue IV diuretics, possible discharge in the next 24 to 48 hours depending on clinical response.    Care time:> 35 mins           Dictation performed with assistance of voice recognition device therefore transcription errors are possible.

## 2024-12-10 NOTE — PROGRESS NOTES
"Subjective Data:  Today, Ms. Roberto is sitting at side of bed, daughter-in-law at bedside. Reports she continues to have shortness of breath that is unchanged, no chest pain but endorses chronic chest heaviness that has been ongoing for \"months\", no dizziness or lightheadedness. Monitor demonstrates AF with rates in the 90s.     12/10/24: Sitting up at side of bed, no acute distress. Patient denies chest pain or pressure, shortness of breath is reported as improved, no dizziness or lightheadedness. Monitor demonstrates AF with rates in 90s this morning. No labs for review today.    Overnight Events:    None     Objective Data:  Last Recorded Vitals:  Vitals:    12/10/24 0436 12/10/24 0733 12/10/24 0739 12/10/24 0800   BP: 117/80   131/76   BP Location: Left arm   Left arm   Patient Position: Lying   Lying   Pulse: 87   97   Resp: 18   18   Temp: 36.5 °C (97.7 °F)   37 °C (98.6 °F)   TempSrc: Temporal   Temporal   SpO2: 97% 100% 100% 90%   Weight: 54.5 kg (120 lb 2.4 oz)      Height:           Last Labs:  CBC - 12/9/2024:  3:53 AM  10.1 11.1 130    33.8      CMP - 12/9/2024:  3:53 AM  8.7 7.5 19 --- 1.2   _ 4.1 11 51      PTT - No results in last year.  _   _ _     TROPHS   Date/Time Value Ref Range Status   12/07/2024 08:48 AM 18 0 - 13 ng/L Final   12/07/2024 07:59 AM 18 0 - 13 ng/L Final     BNP   Date/Time Value Ref Range Status   12/07/2024 07:59  0 - 99 pg/mL Final     HGBA1C   Date/Time Value Ref Range Status   08/17/2020 09:06 AM 5.8 % Final     Comment:          Diagnosis of Diabetes-Adults   Non-Diabetic: < or = 5.6%   Increased risk for developing diabetes: 5.7-6.4%   Diagnostic of diabetes: > or = 6.5%  .       Monitoring of Diabetes                Age (y)     Therapeutic Goal (%)   Adults:          >18           <7.0   Pediatrics:    13-18           <7.5                   7-12           <8.0                   0- 6            7.5-8.5   American Diabetes Association. Diabetes Care 33(S1), Jan 2010.   "     LDLCALC   Date/Time Value Ref Range Status   04/30/2024 07:32  <=99 mg/dL Final     Comment:                                 Near   Borderline      AGE      Desirable  Optimal    High     High     Very High     0-19 Y     0 - 109     ---    110-129   >/= 130     ----    20-24 Y     0 - 119     ---    120-159   >/= 160     ----      >24 Y     0 -  99   100-129  130-159   160-189     >/=190       VLDL   Date/Time Value Ref Range Status   04/30/2024 07:32 AM 16 0 - 40 mg/dL Final   05/01/2023 08:40 AM 19 0 - 40 mg/dL Final      Last I/O:  I/O last 3 completed shifts:  In: 800 (14.7 mL/kg) [P.O.:150; I.V.:50 (0.9 mL/kg); IV Piggyback:600]  Out: - (0 mL/kg)   Weight: 54.5 kg     Past Cardiology Tests (Last 3 Years):  EKG:  ECG 12 lead 12/07/2024 (Preliminary)      ECG 12 Lead 09/20/2024    Echo:  Transthoracic Echo (TTE) Complete 12/07/2024   1. Left ventricular ejection fraction is normal, calculated by Alfred's biplane at 70%.   2. Left ventricular diastolic filling was indeterminate.   3. There is severely reduced right ventricular systolic function.   4. The left atrium is moderately dilated.   5. Mild to moderate tricuspid regurgitation.  Ejection Fractions:  EF   Date/Time Value Ref Range Status   12/07/2024 11:22 AM 70 %      Cath:  No results found for this or any previous visit from the past 1095 days.    Stress Test:  No results found for this or any previous visit from the past 1095 days.    Cardiac Imaging:  No results found for this or any previous visit from the past 1095 days.      Inpatient Medications:  Scheduled medications   Medication Dose Route Frequency    apixaban  2.5 mg oral BID    aspirin  81 mg oral Daily    cefTRIAXone  1 g intravenous q24h    doxycycline  100 mg intravenous q12h    furosemide  40 mg intravenous Daily    guaiFENesin  600 mg oral BID    ipratropium-albuteroL  3 mL nebulization TID    metoprolol tartrate  50 mg oral BID    perflutren lipid microspheres  0.5-10 mL of  dilution intravenous Once in imaging    perflutren lipid microspheres  0.5-10 mL of dilution intravenous Once in imaging    polyethylene glycol  17 g oral Daily    sulfur hexafluoride microsphr  2 mL intravenous Once in imaging     PRN medications   Medication    acetaminophen    Or    acetaminophen    Or    acetaminophen    ipratropium-albuteroL    oxygen     Continuous Medications   Medication Dose Last Rate       Physical Exam:  Physical Exam  Vitals reviewed.   Constitutional:       Appearance: Normal appearance.   HENT:      Head: Normocephalic.      Mouth/Throat:      Mouth: Mucous membranes are moist.   Cardiovascular:      Rate and Rhythm: Normal rate. Rhythm irregularly irregular.      Pulses: Normal pulses.      Heart sounds: Normal heart sounds. No murmur heard.     No friction rub. No gallop.   Pulmonary:      Effort: Pulmonary effort is normal.      Breath sounds: Rales (L>R) present. No wheezing or rhonchi.   Abdominal:      General: Bowel sounds are normal.      Palpations: Abdomen is soft.   Musculoskeletal:         General: Normal range of motion.      Cervical back: Normal range of motion.      Right lower leg: Edema (trace) present.      Left lower leg: Edema (trace) present.   Skin:     General: Skin is warm and dry.   Neurological:      General: No focal deficit present.      Mental Status: She is alert and oriented to person, place, and time.   Psychiatric:         Mood and Affect: Mood normal.         Behavior: Behavior normal.         Thought Content: Thought content normal.         Judgment: Judgment normal.           Assessment/Plan   1) Shortness of breath  I suspect underlying pulmonary causes along with diastolic dysfunction (? Restrictive Cardiomyopathy of elderly)  On IV diuresis, Metoprolol  Echo with normal LV function and no sig valvular disease    12/9/24: Remains with shortness of breath that she reports is unchanged since admission. CT chest with small-to-moderate right pleural  effusion, moderate right middle lobe atelectasis and mild lower lobe atelectasis bilaterally. Recommend to continue on IV diuretic as ordered. Cr worsening today to 1.14 fro 0.87. Recommend pulmonary hygiene per RT discretion. Continue to monitor strict I/O, daily BMP and Mg.     12/10/24: Shortness of breath has improved, continues to have faint bibasilar rales >L. Received IV diuretic this morning, no labs for review, labs ordered. Will add SGLT2i today. Continue to monitor strict I/O, daily BMP and Mg. Continue on IV antibiotics, pulmonary hygiene per medicine service.      2) Permanent Atrial fib  Rates controlled  On Eliquis    12/9/24: Stable, rate controlled.  12/10/24: Rate controlled, continue on metoprolol tartrate 50 mg BID and Apixaban 2.5 mg twice daily for thromboembolism ppx.      3) Acute on  Chronic right heart failure  I suspect due to restrictive CMP and ? Chronic hypoxia from Lungs  Check CT chest to R/O pulmonary pathology  May consider Pulmonary consult    12/9/24: CT as above, recommend to continue on IV diuretics as ordered.   12/10/24: Continues on IV diuretic, added SGLT2i today. Labs ordered and pending today.      Code Status:  Full Code    Laurie Chinchilla, APRN-CNP

## 2024-12-11 LAB
ANION GAP SERPL CALC-SCNC: 13 MMOL/L (ref 10–20)
BACTERIA BLD CULT: NORMAL
BACTERIA BLD CULT: NORMAL
BUN SERPL-MCNC: 21 MG/DL (ref 6–23)
CALCIUM SERPL-MCNC: 9 MG/DL (ref 8.6–10.3)
CHLORIDE SERPL-SCNC: 96 MMOL/L (ref 98–107)
CO2 SERPL-SCNC: 32 MMOL/L (ref 21–32)
CREAT SERPL-MCNC: 1.29 MG/DL (ref 0.5–1.05)
EGFRCR SERPLBLD CKD-EPI 2021: 39 ML/MIN/1.73M*2
GLUCOSE SERPL-MCNC: 134 MG/DL (ref 74–99)
POTASSIUM SERPL-SCNC: 4.3 MMOL/L (ref 3.5–5.3)
SODIUM SERPL-SCNC: 137 MMOL/L (ref 136–145)

## 2024-12-11 PROCEDURE — 2500000004 HC RX 250 GENERAL PHARMACY W/ HCPCS (ALT 636 FOR OP/ED): Performed by: NURSE PRACTITIONER

## 2024-12-11 PROCEDURE — 94669 MECHANICAL CHEST WALL OSCILL: CPT

## 2024-12-11 PROCEDURE — 2500000005 HC RX 250 GENERAL PHARMACY W/O HCPCS: Performed by: HOSPITALIST

## 2024-12-11 PROCEDURE — 80048 BASIC METABOLIC PNL TOTAL CA: CPT | Performed by: HOSPITALIST

## 2024-12-11 PROCEDURE — 2500000001 HC RX 250 WO HCPCS SELF ADMINISTERED DRUGS (ALT 637 FOR MEDICARE OP): Performed by: CLINICAL NURSE SPECIALIST

## 2024-12-11 PROCEDURE — 36415 COLL VENOUS BLD VENIPUNCTURE: CPT | Performed by: HOSPITALIST

## 2024-12-11 PROCEDURE — 2500000001 HC RX 250 WO HCPCS SELF ADMINISTERED DRUGS (ALT 637 FOR MEDICARE OP): Performed by: NURSE PRACTITIONER

## 2024-12-11 PROCEDURE — 99232 SBSQ HOSP IP/OBS MODERATE 35: CPT | Performed by: NURSE PRACTITIONER

## 2024-12-11 PROCEDURE — 94640 AIRWAY INHALATION TREATMENT: CPT

## 2024-12-11 PROCEDURE — 2060000001 HC INTERMEDIATE ICU ROOM DAILY

## 2024-12-11 PROCEDURE — 2500000002 HC RX 250 W HCPCS SELF ADMINISTERED DRUGS (ALT 637 FOR MEDICARE OP, ALT 636 FOR OP/ED): Performed by: HOSPITALIST

## 2024-12-11 PROCEDURE — 99232 SBSQ HOSP IP/OBS MODERATE 35: CPT | Performed by: HOSPITALIST

## 2024-12-11 PROCEDURE — 2500000004 HC RX 250 GENERAL PHARMACY W/ HCPCS (ALT 636 FOR OP/ED): Performed by: HOSPITALIST

## 2024-12-11 PROCEDURE — 2500000002 HC RX 250 W HCPCS SELF ADMINISTERED DRUGS (ALT 637 FOR MEDICARE OP, ALT 636 FOR OP/ED): Performed by: INTERNAL MEDICINE

## 2024-12-11 RX ORDER — FUROSEMIDE 40 MG/1
40 TABLET ORAL DAILY
Status: DISCONTINUED | OUTPATIENT
Start: 2024-12-12 | End: 2024-12-12 | Stop reason: HOSPADM

## 2024-12-11 RX ORDER — LANOLIN ALCOHOL/MO/W.PET/CERES
400 CREAM (GRAM) TOPICAL 2 TIMES DAILY
Status: DISCONTINUED | OUTPATIENT
Start: 2024-12-11 | End: 2024-12-12 | Stop reason: HOSPADM

## 2024-12-11 RX ORDER — POTASSIUM CHLORIDE 750 MG/1
40 TABLET, FILM COATED, EXTENDED RELEASE ORAL ONCE
Status: COMPLETED | OUTPATIENT
Start: 2024-12-11 | End: 2024-12-11

## 2024-12-11 ASSESSMENT — COGNITIVE AND FUNCTIONAL STATUS - GENERAL
DAILY ACTIVITIY SCORE: 24
CLIMB 3 TO 5 STEPS WITH RAILING: A LITTLE
CLIMB 3 TO 5 STEPS WITH RAILING: A LITTLE
MOVING TO AND FROM BED TO CHAIR: A LITTLE
MOVING FROM LYING ON BACK TO SITTING ON SIDE OF FLAT BED WITH BEDRAILS: A LITTLE
STANDING UP FROM CHAIR USING ARMS: A LITTLE
MOBILITY SCORE: 22
DAILY ACTIVITIY SCORE: 24
MOBILITY SCORE: 18
TURNING FROM BACK TO SIDE WHILE IN FLAT BAD: A LITTLE
WALKING IN HOSPITAL ROOM: A LITTLE
WALKING IN HOSPITAL ROOM: A LITTLE

## 2024-12-11 ASSESSMENT — PAIN - FUNCTIONAL ASSESSMENT
PAIN_FUNCTIONAL_ASSESSMENT: 0-10

## 2024-12-11 ASSESSMENT — PAIN SCALES - GENERAL
PAINLEVEL_OUTOF10: 0 - NO PAIN

## 2024-12-11 NOTE — PROGRESS NOTES
Discussed with Dr Paz. Cardiology is still diuresing. Anticipate discharge home in the next 24-48 hours.

## 2024-12-11 NOTE — CARE PLAN
Problem: Nutrition  Goal: Adequate PO fluid intake  Outcome: Progressing     Problem: Pain - Adult  Goal: Verbalizes/displays adequate comfort level or baseline comfort level  Outcome: Progressing     Problem: Safety - Adult  Goal: Free from fall injury  Outcome: Progressing

## 2024-12-11 NOTE — CARE PLAN
Problem: Discharge Planning  Goal: Discharge to home or other facility with appropriate resources  Outcome: Progressing     Problem: Chronic Conditions and Co-morbidities  Goal: Patient's chronic conditions and co-morbidity symptoms are monitored and maintained or improved  Outcome: Progressing     Problem: Skin  Goal: Participates in plan/prevention/treatment measures  Outcome: Progressing  Goal: Prevent/manage excess moisture  Outcome: Progressing  Goal: Prevent/minimize sheer/friction injuries  Outcome: Progressing  Goal: Promote/optimize nutrition  Outcome: Progressing

## 2024-12-11 NOTE — PROGRESS NOTES
"Subjective Data:  Today, Ms. Roberto is sitting at side of bed, daughter-in-law at bedside. Reports she continues to have shortness of breath that is unchanged, no chest pain but endorses chronic chest heaviness that has been ongoing for \"months\", no dizziness or lightheadedness. Monitor demonstrates AF with rates in the 90s.     12/10/24: Sitting up at side of bed, no acute distress. Patient denies chest pain or pressure, shortness of breath is reported as improved, no dizziness or lightheadedness. Monitor demonstrates AF with rates in 90s this morning. No labs for review today.  12-11-24: Daughter at bedside.  Had good night.  No CP/pressure.  Dyspnea has improved, on room air.  Still with productive cough. K 3.3, creatinine 1.01.  Monitor: Afib.    Overnight Events:    None     Objective Data:  Last Recorded Vitals:  Vitals:    12/11/24 0016 12/11/24 0501 12/11/24 0739 12/11/24 0826   BP: 102/63 115/70  117/54   BP Location:    Left arm   Patient Position: Lying Lying  Lying   Pulse: 92 93  100   Resp: 17 18  18   Temp: 36.8 °C (98.3 °F) 36.6 °C (97.8 °F)  36.8 °C (98.3 °F)   TempSrc: Temporal Temporal  Temporal   SpO2: 93% 93% 93% 90%   Weight:  55.2 kg (121 lb 11.1 oz)     Height:           Last Labs:  Results from last 7 days   Lab Units 12/10/24  0929 12/09/24  0353 12/08/24  0521   SODIUM mmol/L 135* 132* 133*   POTASSIUM mmol/L 3.3* 3.6 3.5   CHLORIDE mmol/L 97* 96* 100   CO2 mmol/L 30 27 27   BUN mg/dL 18 22 16   CREATININE mg/dL 1.01 1.14* 0.87   GLUCOSE mg/dL 161* 88 72*   CALCIUM mg/dL 8.5* 8.7 6.7*     Results from last 7 days   Lab Units 12/09/24  0353   WBC AUTO x10*3/uL 10.1   HEMOGLOBIN g/dL 11.1*   HEMATOCRIT % 33.8*   PLATELETS AUTO x10*3/uL 130*         TROPHS   Date/Time Value Ref Range Status   12/07/2024 08:48 AM 18 0 - 13 ng/L Final   12/07/2024 07:59 AM 18 0 - 13 ng/L Final     BNP   Date/Time Value Ref Range Status   12/07/2024 07:59  0 - 99 pg/mL Final     HGBA1C   Date/Time Value Ref " Range Status   08/17/2020 09:06 AM 5.8 % Final     Comment:          Diagnosis of Diabetes-Adults   Non-Diabetic: < or = 5.6%   Increased risk for developing diabetes: 5.7-6.4%   Diagnostic of diabetes: > or = 6.5%  .       Monitoring of Diabetes                Age (y)     Therapeutic Goal (%)   Adults:          >18           <7.0   Pediatrics:    13-18           <7.5                   7-12           <8.0                   0- 6            7.5-8.5   American Diabetes Association. Diabetes Care 33(S1), Jan 2010.       LDLCALC   Date/Time Value Ref Range Status   04/30/2024 07:32  <=99 mg/dL Final     Comment:                                 Near   Borderline      AGE      Desirable  Optimal    High     High     Very High     0-19 Y     0 - 109     ---    110-129   >/= 130     ----    20-24 Y     0 - 119     ---    120-159   >/= 160     ----      >24 Y     0 -  99   100-129  130-159   160-189     >/=190       VLDL   Date/Time Value Ref Range Status   04/30/2024 07:32 AM 16 0 - 40 mg/dL Final   05/01/2023 08:40 AM 19 0 - 40 mg/dL Final      Last I/O:  I/O last 3 completed shifts:  In: 470 (8.5 mL/kg) [P.O.:120; IV Piggyback:350]  Out: 300 (5.4 mL/kg) [Urine:300 (0.2 mL/kg/hr)]  Weight: 55.2 kg     Past Cardiology Tests (Last 3 Years):    Echo:  Transthoracic Echo (TTE) Complete 12/07/2024   1. Left ventricular ejection fraction is normal, calculated by Alfred's biplane at 70%.   2. Left ventricular diastolic filling was indeterminate.   3. There is severely reduced right ventricular systolic function.   4. The left atrium is moderately dilated.   5. Mild to moderate tricuspid regurgitation.  Ejection Fractions:  EF   Date/Time Value Ref Range Status   12/07/2024 11:22 AM 70 %      CT Chest:  IMPRESSION:  1.  Small-to-moderate right pleural effusion, moderate right middle  lobe atelectasis and mild lower lobe atelectasis bilaterally    Inpatient Medications:  Scheduled medications   Medication Dose Route Frequency     apixaban  2.5 mg oral BID    aspirin  81 mg oral Daily    cefTRIAXone  1 g intravenous q24h    doxycycline  100 mg intravenous q12h    empagliflozin  10 mg oral Daily    furosemide  40 mg intravenous Daily    guaiFENesin  600 mg oral BID    ipratropium-albuteroL  3 mL nebulization TID    melatonin  3 mg oral Daily    metoprolol tartrate  50 mg oral BID    perflutren lipid microspheres  0.5-10 mL of dilution intravenous Once in imaging    perflutren lipid microspheres  0.5-10 mL of dilution intravenous Once in imaging    polyethylene glycol  17 g oral Daily    sulfur hexafluoride microsphr  2 mL intravenous Once in imaging     PRN medications   Medication    acetaminophen    Or    acetaminophen    Or    acetaminophen    ipratropium-albuteroL    oxygen     Continuous Medications   Medication Dose Last Rate       Physical Exam:  Physical Exam  Vitals reviewed.   Constitutional:       Appearance: Normal appearance.   HENT:      Head: Normocephalic.      Mouth/Throat:      Mouth: Mucous membranes are moist.   Cardiovascular:      Rate and Rhythm: Normal rate. Rhythm irregularly irregular.      Pulses: Normal pulses.      Heart sounds: Normal heart sounds. No murmur heard.     No friction rub. No gallop.   Pulmonary:      Effort: Pulmonary effort is normal.      Breath sounds: No wheezing, rhonchi or rales.      Comments: R base with crackles and wheezes  Abdominal:      General: Bowel sounds are normal.      Palpations: Abdomen is soft.   Musculoskeletal:         General: Normal range of motion.      Cervical back: Normal range of motion.      Right lower leg: No edema.      Left lower leg: No edema.   Skin:     General: Skin is warm and dry.   Neurological:      General: No focal deficit present.      Mental Status: She is alert and oriented to person, place, and time.   Psychiatric:         Mood and Affect: Mood normal.         Behavior: Behavior normal.         Thought Content: Thought content normal.          Judgment: Judgment normal.           Assessment/Plan   1) Shortness of breath  I suspect underlying pulmonary causes along with diastolic dysfunction (? Restrictive Cardiomyopathy of elderly)  On IV diuresis, Metoprolol  Echo with normal LV function and no sig valvular disease    12/9/24: Remains with shortness of breath that she reports is unchanged since admission. CT chest with small-to-moderate right pleural effusion, moderate right middle lobe atelectasis and mild lower lobe atelectasis bilaterally. Recommend to continue on IV diuretic as ordered. Cr worsening today to 1.14 fro 0.87. Recommend pulmonary hygiene per RT discretion. Continue to monitor strict I/O, daily BMP and Mg.     12/10/24: Shortness of breath has improved, continues to have faint bibasilar rales >L. Received IV diuretic this morning, no labs for review, labs ordered. Will add SGLT2i today. Continue to monitor strict I/O, daily BMP and Mg. Continue on IV antibiotics, pulmonary hygiene per medicine service.     12-11-24: Combination of RHF and PNA: Will continue on IV Lasix today and change to oral tomorrow.  She should be on low sodium diet. Continue on jardiance, and metoprolol. On ATB per IMS.     2) Permanent Atrial fib  Rates controlled  On Eliquis    12/9/24: Stable, rate controlled.  12/10/24: Rate controlled, continue on metoprolol tartrate 50 mg BID and Apixaban 2.5 mg twice daily for thromboembolism ppx.   12-11-24: Rate controlled Afib.  Continue on BB and Eliquis.      3) Acute on  Chronic right heart failure  I suspect due to restrictive CMP and ? Chronic hypoxia from Lungs  Check CT chest to R/O pulmonary pathology  May consider Pulmonary consult    12/9/24: CT as above, recommend to continue on IV diuretics as ordered.   12/10/24: Continues on IV diuretic, added SGLT2i today. Labs ordered and pending today.  12-11-24: IV lasix today and change to oral tomorrow. Continue on Jardiance.       Code Status:  Full Code    Maddi TYRELL  DENZEL Conroy-CNP

## 2024-12-12 ENCOUNTER — PHARMACY VISIT (OUTPATIENT)
Dept: PHARMACY | Facility: CLINIC | Age: 89
End: 2024-12-12
Payer: COMMERCIAL

## 2024-12-12 VITALS
SYSTOLIC BLOOD PRESSURE: 125 MMHG | WEIGHT: 117.5 LBS | DIASTOLIC BLOOD PRESSURE: 71 MMHG | HEART RATE: 70 BPM | TEMPERATURE: 98.3 F | HEIGHT: 61 IN | BODY MASS INDEX: 22.19 KG/M2 | RESPIRATION RATE: 16 BRPM | OXYGEN SATURATION: 92 %

## 2024-12-12 LAB
ANION GAP SERPL CALC-SCNC: 12 MMOL/L (ref 10–20)
BUN SERPL-MCNC: 24 MG/DL (ref 6–23)
CALCIUM SERPL-MCNC: 9 MG/DL (ref 8.6–10.3)
CHLORIDE SERPL-SCNC: 98 MMOL/L (ref 98–107)
CO2 SERPL-SCNC: 32 MMOL/L (ref 21–32)
CREAT SERPL-MCNC: 1.27 MG/DL (ref 0.5–1.05)
EGFRCR SERPLBLD CKD-EPI 2021: 40 ML/MIN/1.73M*2
GLUCOSE SERPL-MCNC: 88 MG/DL (ref 74–99)
MAGNESIUM SERPL-MCNC: 2 MG/DL (ref 1.6–2.4)
POTASSIUM SERPL-SCNC: 4.7 MMOL/L (ref 3.5–5.3)
PROCALCITONIN SERPL-MCNC: 0.05 NG/ML
SODIUM SERPL-SCNC: 137 MMOL/L (ref 136–145)

## 2024-12-12 PROCEDURE — 2500000004 HC RX 250 GENERAL PHARMACY W/ HCPCS (ALT 636 FOR OP/ED): Performed by: NURSE PRACTITIONER

## 2024-12-12 PROCEDURE — RXMED WILLOW AMBULATORY MEDICATION CHARGE

## 2024-12-12 PROCEDURE — 80048 BASIC METABOLIC PNL TOTAL CA: CPT | Performed by: HOSPITALIST

## 2024-12-12 PROCEDURE — 83735 ASSAY OF MAGNESIUM: CPT | Performed by: HOSPITALIST

## 2024-12-12 PROCEDURE — 2500000001 HC RX 250 WO HCPCS SELF ADMINISTERED DRUGS (ALT 637 FOR MEDICARE OP): Performed by: NURSE PRACTITIONER

## 2024-12-12 PROCEDURE — 36415 COLL VENOUS BLD VENIPUNCTURE: CPT | Performed by: HOSPITALIST

## 2024-12-12 PROCEDURE — 2500000001 HC RX 250 WO HCPCS SELF ADMINISTERED DRUGS (ALT 637 FOR MEDICARE OP): Performed by: CLINICAL NURSE SPECIALIST

## 2024-12-12 PROCEDURE — 2500000004 HC RX 250 GENERAL PHARMACY W/ HCPCS (ALT 636 FOR OP/ED): Performed by: HOSPITALIST

## 2024-12-12 PROCEDURE — 99232 SBSQ HOSP IP/OBS MODERATE 35: CPT | Performed by: NURSE PRACTITIONER

## 2024-12-12 PROCEDURE — 84145 PROCALCITONIN (PCT): CPT | Mod: PORLAB | Performed by: HOSPITALIST

## 2024-12-12 PROCEDURE — 99239 HOSP IP/OBS DSCHRG MGMT >30: CPT | Performed by: HOSPITALIST

## 2024-12-12 RX ORDER — DAPAGLIFLOZIN 10 MG/1
10 TABLET, FILM COATED ORAL DAILY
Qty: 30 TABLET | Refills: 0 | Status: SHIPPED | OUTPATIENT
Start: 2024-12-13

## 2024-12-12 RX ORDER — FUROSEMIDE 40 MG/1
40 TABLET ORAL DAILY
Qty: 30 TABLET | Refills: 0 | Status: SHIPPED | OUTPATIENT
Start: 2024-12-13 | End: 2025-01-12

## 2024-12-12 RX ORDER — ACETAMINOPHEN 325 MG/1
650 TABLET ORAL EVERY 4 HOURS PRN
Qty: 30 TABLET | Refills: 0 | Status: SHIPPED | OUTPATIENT
Start: 2024-12-12

## 2024-12-12 ASSESSMENT — COGNITIVE AND FUNCTIONAL STATUS - GENERAL
MOBILITY SCORE: 22
WALKING IN HOSPITAL ROOM: A LITTLE
DAILY ACTIVITIY SCORE: 24
CLIMB 3 TO 5 STEPS WITH RAILING: A LITTLE

## 2024-12-12 ASSESSMENT — PAIN - FUNCTIONAL ASSESSMENT
PAIN_FUNCTIONAL_ASSESSMENT: 0-10
PAIN_FUNCTIONAL_ASSESSMENT: 0-10

## 2024-12-12 ASSESSMENT — PAIN SCALES - GENERAL
PAINLEVEL_OUTOF10: 0 - NO PAIN
PAINLEVEL_OUTOF10: 0 - NO PAIN

## 2024-12-12 NOTE — CARE PLAN
Patient being discharged with all belongings. Daughter in law notified for transportation    The clinical goals for the shift include stable for discharge      Problem: Nutrition  Goal: Adequate PO fluid intake  Outcome: Adequate for Discharge  Goal: Nutrition support goals are met within 48 hrs  Outcome: Adequate for Discharge  Goal: Electrolytes WNL  Outcome: Adequate for Discharge  Goal: Promote healing  Outcome: Adequate for Discharge  Goal: Maintain stable weight  Outcome: Adequate for Discharge     Problem: Pain - Adult  Goal: Verbalizes/displays adequate comfort level or baseline comfort level  Outcome: Adequate for Discharge     Problem: Safety - Adult  Goal: Free from fall injury  Outcome: Adequate for Discharge     Problem: Discharge Planning  Goal: Discharge to home or other facility with appropriate resources  Outcome: Adequate for Discharge     Problem: Chronic Conditions and Co-morbidities  Goal: Patient's chronic conditions and co-morbidity symptoms are monitored and maintained or improved  Outcome: Adequate for Discharge     Problem: Skin  Goal: Participates in plan/prevention/treatment measures  Outcome: Adequate for Discharge  Goal: Prevent/manage excess moisture  Outcome: Adequate for Discharge  Goal: Prevent/minimize sheer/friction injuries  Outcome: Adequate for Discharge  Goal: Promote/optimize nutrition  Outcome: Adequate for Discharge

## 2024-12-12 NOTE — PROGRESS NOTES
Spoke to patient whom declined HHC on discharge. She states that her daughter assists her with everything she needs. Advised her that if she feels that she needs HHC once discharged she will need to contact her PCP for orders. She denied any other home going needs at this time. [facility] will continue to follow for needs if they arise.

## 2024-12-12 NOTE — DISCHARGE INSTRUCTIONS
Patient should seek medical attention immediately if condition should worsen, new symptoms develop , no further improvement or recurrence of presenting symptomatology, patient warned.    Recommend close follow-up with family physician concerning monitoring of renal function while on increased dose of diuretics .    Follow-up with cardiology as scheduled.    Small-to-moderate right pleural effusion, moderate right middle   lobe atelectasis and mild lower lobe atelectasis bilaterally ..............................Recommend follow-up chest radiograph with family physician in 2 of 3 weeks, or sooner dependent on clinical status.

## 2024-12-12 NOTE — PROGRESS NOTES
Linda Roberto [id number]   Service: Internal Medicine / Hospitalist Date of service: 12/10/2024                                  Full Code              Subjective     Linda Roberto is a 89 y.o. female with PMHx of atrial fib on apixaban, HTN, mitral valve insufficiency and chronic bilateral leg edema who presented with cough and SOB for three days. O2 sats in triage were 80-84% on RA.  Family at bedside assist with HPI and say she has had dyspnea and moist cough, lives alone.  She was initially in afib RVR which has resolved. She is not on home O2. She says her leg edema is no worse recently. She has not slept upright since September due to chest pressure, which she denies otherwise. In the ED her clinical picture was concerning for pneumonia as well as pulmonary edema and she was started on IV Abx and placed on oxygen.  Remainder of ROS reviewed and negative except as indicated in HPI.      ED Course:  Vitals on presentation: T 36.8C>38.2C P 104>126 RR 20>32 /83 O2 82/RA  Remarkable labs: creatinine 1.25, , WBC 13.8k, lactate 1.5  EKG: atrial fibrillation with RVR. No STEMI. Normal axis.   Imaging: CXR: Small right pleural effusion   Interventions: Blood cultures, doxycycline, ceftriaxone, IV Lasix, IV magnesium     12/09/24: Patient seen by cardiology  Echo showing normal LV function no significant valvular disease patient continued on diuretics she continues to complain of ongoing cough.  She has been afebrile there is no leukocytosis.  A CT scan of the chest was done there is evidence of small to moderate  right pleural effusion and right middle lobe atelectasis and bilateral lower lobe atelectasis.       12/10........Patient reported she was feeling better express her desire to be discharged soon.  No reported ; palpitations, headaches, chest pains, nausea or vomiting.      12/11................No new complaints voiced by patient at the time of evaluation.  No reported headaches, chest pains,  palpitations, nausea or vomiting.     Review of Systems:   Review of system otherwise negative if not aforementioned above in subjective.     Objective        Physical Exam      Constitutional:       Appearance: Patient appeared in no acute cardiopulmonary distress.     Comments: Patient alert and oriented to person place time and situation.Patient appeared clinically improved was able to speak in full sentences.  HEENT:      Head: Normocephalic and atraumatic.Trachea midline      Nose:No observed congestion or rhinorrhea.     Mouth/Throat: Mucous membranes Moist, Trachea appeared  midline.  Eyes:      Extraocular Movements: Extraocular movements intact.      Pupils: Pupils are equal, round, and reactive to light.      Comments: No scleral icterus or conjunctival injection appreciated.   Cardiovascular:      Rate and Rhythm: Irregular rate and rhythm.No clicks rubs or gallops.No peripheral stigmata of endocarditis appreciated.     Pulmonary:      Isolated anterior crackles, less pronounced today. diminished lung bases posteriorly.  Abdominal:      General: Abdomen soft, nontender, active bowel sounds, no involuntary guarding or rebound tenderness appreciated.     Comments: None   Musculoskeletal:       Patient appeared to have full active range of motion for upper and lower extremities, no acute apparent joint deformity appreciated on examination.   No pitting edema or cyanosis appreciated.       Lymphadenopathy:      No appreciable palpable lymphadenopathy  Skin:     General: Skin is warm.      Coloration:  No jaundice     Findings: No abnormal appearing skin rashes or lesions that appeared acute noted on unclothed area of the skin..   Neurological:      General: No focal sensory or motor deficits appreciated, no meningeal signs or dysmetria noted.      Cranial Nerves: Cranial nerves II to XII appearing grossly intact.     Genitals:  Deferred  Psychiatric:         The patient appears to be displaying normal mood  and affect at the time of evaluation.     Labs:           Lab Results   Component Value Date     GLUCOSE 88 12/09/2024     CALCIUM 8.7 12/09/2024      (L) 12/09/2024     K 3.6 12/09/2024     CO2 27 12/09/2024     CL 96 (L) 12/09/2024     BUN 22 12/09/2024     CREATININE 1.14 (H) 12/09/2024            Lab Results   Component Value Date     WBC 10.1 12/09/2024     HGB 11.1 (L) 12/09/2024     HCT 33.8 (L) 12/09/2024     MCV 92 12/09/2024      (L) 12/09/2024      Lab Results   Component Value Date    GLUCOSE 161 (H) 12/10/2024    CALCIUM 8.5 (L) 12/10/2024     (L) 12/10/2024    K 3.3 (L) 12/10/2024    CO2 30 12/10/2024    CL 97 (L) 12/10/2024    BUN 18 12/10/2024    CREATININE 1.01 12/10/2024         [unfilled]   [unfilled]   Susceptibility data from last 90 days.  Collected Specimen Info Organism   12/07/24 Fluid from SPUTUM Normal throat florin                   X-rays/ Images     [unfilled]   Radiology Results (last 21 days)     Procedure Component Value Units Date/Time   CT chest w IV contrast [[id number]] Collected: 12/09/24 0732   Order Status: Completed Updated: 12/09/24 0732   Narrative:     Interpreted By:  Ella Samuels,  STUDY:  CT CHEST W IV CONTRAST;  12/8/2024 9:02 pm      INDICATION:  Signs/Symptoms:Shortness of breath.          COMPARISON:  None.      ACCESSION NUMBER(S):  [id number]      ORDERING CLINICIAN:  JULITA CARTER      TECHNIQUE:  Helical data acquisition of the chest was obtained following  intravenous administration of 50 ML Omnipaque 350.  Images were  reformatted in axial, coronal, and sagittal planes.      FINDINGS:  LUNGS AND AIRWAYS:  The trachea and central airways are patent. No endobronchial lesion  is seen. Bilateral lower lobe peripheral endobronchial mucous plugs  are noted with the associated peribronchial inflammation.      Moderate atelectasis is present in the right middle lobe. There is a  small to moderate right pleural effusion with the  accompanying right  lower lobe atelectasis dependently. Minimal atelectasis is noted left  lung base. 1.2 cm densely calcified nodule is present medial right  lung base (image 220 of 319 feces.          MEDIASTINUM AND JASWINDER, LOWER NECK AND AXILLA:  The visualized thyroid gland is within normal limits.  1 cm right paratracheal lymph node is noted. A few subcentimeter  mediastinal lymph nodes are also visible. Calcified lymph nodes are  noted subcarinal region and lower right hilum Esophagus appears  within normal limits as seen.      HEART AND VESSELS:  The thoracic aorta normal in course and caliber.  Main pulmonary artery and its branches are normal in caliber.  No coronary artery calcifications are seen. Please note, the study is  not optimized for evaluation of coronary arteries. The cardiac  chambers are not enlarged. There is no pericardial effusion seen.      UPPER ABDOMEN:  2.5 cm cyst is noted upper left kidney              CHEST WALL AND OSSEOUS STRUCTURES:  Chest wall is within normal limits.  No acute osseous pathology.There are no suspicious osseous lesions.       Impression:     1.  Small-to-moderate right pleural effusion, moderate right middle  lobe atelectasis and mild lower lobe atelectasis bilaterally      Signed by: Ella Samuels 12/9/2024 7:31 AM  Dictation workstation:   [id number]   XR chest 2 views [[id number]] Collected: 12/07/24 0939   Order Status: Completed Updated: 12/07/24 0939   Narrative:     Interpreted By:  Chelly Marquez,  STUDY:  XR CHEST 2 VIEWS;  12/7/2024 8:31 am      INDICATION:  Signs/Symptoms:cough, hypoxia.      COMPARISON:  None.      ACCESSION NUMBER(S):  [id number]      ORDERING CLINICIAN:  TRUDY TODD      FINDINGS:  CARDIOMEDIASTINAL SILHOUETTE:  The heart is enlarged.          LUNGS:  There is a small right pleural effusion.      ABDOMEN:  No remarkable upper abdominal findings.          BONES:  No acute osseous changes.       Impression:     Small right pleural  effusion.      MACRO:  None      Signed by: Chelly Marquez 12/7/2024 9:38 AM  Dictation workstation:   [id number]                  Medical Problems         Problem List         * (Principal) Acute respiratory failure with hypoxia (Multi)     Benign essential hypertension (Chronic)     Bilateral lower extremity edema (Chronic)     Chronic constipation (Chronic)     Chronic low back pain     Diverticulosis of colon     Hyperlipidemia     Lumbar radiculopathy     Mitral valve insufficiency     Mild vitamin D deficiency     Venous reflux     Medicare annual wellness visit, subsequent     Abnormal ankle brachial index (AMANDA) (Chronic)     Overview Signed 5/1/2024  4:31 PM by Laurie Avina DO       AMANDA performed 09/02/2022 revealed 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            Alterations of sensations     Fall     Stage 3b chronic kidney disease (Multi) (Chronic)     Claudication of lower extremity (CMS-HCC)     Disorder of skin of upper extremity     History of cataract     Pain of lower extremity     Right foot pain     Urinary tract infection     Atherosclerosis of native artery of both lower extremities (CMS-HCC)     Onychomycosis                  Above medical problems may be reflective of historical medical problems that may have resolved and may not related to acute clinical condition/medical problems.     Clinical impression/plan:        Acute respiratory failure with hypoxia  Continue  empiric doxycycline and ceftriaxone, continue Mucinex and Duoneb, sputum cx pending  CT of chest showing small to moderate right-sided effusion bilateral lower lobe endobronchial mucous plugs  I educated pt on incentive spirometry 10x/hour  Will add flutter valve therapy to improve expectoration  Wean O2 off as tolerated to maintain O2 sat >91%, pt may need home O2 eval      Hx chronic leg  edema, probable diastolic heart failure  Cardiology consulted, pt follows with them for edema  AMANDA 9/2/2022 revealed moderate LLE arterial occlusive disease significant stenosis and/or occlusion at the left supra-popliteal artery  Venous insufficiency reflux ultrasound 7/22/2022 showed bilateral saphenofemoral vein reflux  Echo 4/20/2022 showed normal LV systolic function with EF of 70% and severely reduced RVSP  Repeat echo revealed reduced RV function, normal EF  Continue IV Lasix daily for now     Hypokalemia  -Supplement potassium     Atrial fib with RVR  Spontaneously converted in ED, now rate controlled  Continue metoprolol and apixaban     CKD, at baseline     DVT ppx: apixaban        Code Status: Full Code         Disposition/additional care plan/interventions: 12/10/2024     As needed melatonin for insomnia.     Agree with flutter valve     Continue monitor respiratory status closely     IV diuresis duration as per cardiology     Hypokalemia.............. supplement potassium     Monitor surrogate magnesium levels     2D echocardiogram reviewed, EF preserved, severely reduced right ventricular systolic function     Permanent A-fib with acceptable ventricular response, continue rate control therapy and anticoagulation with Eliquis         Disposition/additional care plan/interventions: 12/11/2024    Continue diuresis as per cardiology today monitor clinical response.      Will continue to monitor electrolytes  Patient appeared clinically improved adventitious sounds appreciated on auscultation of the lungs today but less pronounced than in the last 24 hours.       Further recommendations forthcoming in accordance with patient's clinical disposition and response to care.     Discharge planning:Discharge timing in accordance with recommendation by cardiology, continue IV diuretics, possible discharge in the next 24 hours depending on clinical response.     Care time:> 35 mins              Dictation performed  with assistance of voice recognition device therefore transcription errors are possible.

## 2024-12-12 NOTE — PROGRESS NOTES
Linda Roberto [id number]   Service: Internal Medicine / Hospitalist Date of service: 12/12/2024                                  Full Code               Subjective     Linda Roberto is a 89 y.o. female with PMHx of atrial fib on apixaban, HTN, mitral valve insufficiency and chronic bilateral leg edema who presented with cough and SOB for three days. O2 sats in triage were 80-84% on RA.  Family at bedside assist with HPI and say she has had dyspnea and moist cough, lives alone.  She was initially in afib RVR which has resolved. She is not on home O2. She says her leg edema is no worse recently. She has not slept upright since September due to chest pressure, which she denies otherwise. In the ED her clinical picture was concerning for pneumonia as well as pulmonary edema and she was started on IV Abx and placed on oxygen.  Remainder of ROS reviewed and negative except as indicated in HPI.      ED Course:  Vitals on presentation: T 36.8C>38.2C P 104>126 RR 20>32 /83 O2 82/RA  Remarkable labs: creatinine 1.25, , WBC 13.8k, lactate 1.5  EKG: atrial fibrillation with RVR. No STEMI. Normal axis.   Imaging: CXR: Small right pleural effusion   Interventions: Blood cultures, doxycycline, ceftriaxone, IV Lasix, IV magnesium     12/09/24: Patient seen by cardiology  Echo showing normal LV function no significant valvular disease patient continued on diuretics she continues to complain of ongoing cough.  She has been afebrile there is no leukocytosis.  A CT scan of the chest was done there is evidence of small to moderate  right pleural effusion and right middle lobe atelectasis and bilateral lower lobe atelectasis.        12/10........Patient reported she was feeling better express her desire to be discharged soon.  No reported ; palpitations, headaches, chest pains, nausea or vomiting.        12/11................No new complaints voiced by patient at the time of evaluation.  No reported headaches, chest pains,  palpitations, nausea or vomiting.    12/12.......................Patient reported feeling well this morning.  She appeared excited about possible discharge home today no acute complaints voiced by patient.  Patient seen and examined in presence of her nurse.  No reported: chest pains, headaches, fevers, chills, nausea  or vomiting.     Review of Systems:   Review of system otherwise negative if not aforementioned above in subjective.     Objective        Physical Exam      Constitutional:       Appearance: Patient appeared in no acute cardiopulmonary distress.     Comments: Patient alert and oriented to person place time and situation.Patient appeared clinically improved was able to speak in full sentences.  HEENT:      Head: Normocephalic and atraumatic.Trachea midline      Nose:No observed congestion or rhinorrhea.     Mouth/Throat: Mucous membranes Moist, Trachea appeared  midline.  Eyes:      Extraocular Movements: Extraocular movements intact.      Pupils: Pupils are equal, round, and reactive to light.      Comments: No scleral icterus or conjunctival injection appreciated.   Cardiovascular:      Rate and Rhythm: Irregular rate and rhythm.No clicks rubs or gallops.No peripheral stigmata of endocarditis appreciated.     Pulmonary:      Lung bases diminished without appreciation of adventitious sounds.  Abdominal:      General: Abdomen soft, nontender, active bowel sounds, no involuntary guarding or rebound tenderness appreciated.     Comments: None   Musculoskeletal:       Patient appeared to have full active range of motion for upper and lower extremities, no acute apparent joint deformity appreciated on examination.   No pitting edema or cyanosis appreciated.       Lymphadenopathy:      No appreciable palpable lymphadenopathy  Skin:     General: Skin is warm.      Coloration:  No jaundice     Findings: No abnormal appearing skin rashes or lesions that appeared acute noted on unclothed area of the skin..    Neurological:      General: No focal sensory or motor deficits appreciated, no meningeal signs or dysmetria noted.      Cranial Nerves: Cranial nerves II to XII appearing grossly intact.     Genitals:  Deferred  Psychiatric:         The patient appears to be displaying normal mood and affect at the time of evaluation.     Labs:               Lab Results   Component Value Date     GLUCOSE 88 12/09/2024     CALCIUM 8.7 12/09/2024      (L) 12/09/2024     K 3.6 12/09/2024     CO2 27 12/09/2024     CL 96 (L) 12/09/2024     BUN 22 12/09/2024     CREATININE 1.14 (H) 12/09/2024                Lab Results   Component Value Date     WBC 10.1 12/09/2024     HGB 11.1 (L) 12/09/2024     HCT 33.8 (L) 12/09/2024     MCV 92 12/09/2024      (L) 12/09/2024            Lab Results   Component Value Date     GLUCOSE 161 (H) 12/10/2024     CALCIUM 8.5 (L) 12/10/2024      (L) 12/10/2024     K 3.3 (L) 12/10/2024     CO2 30 12/10/2024     CL 97 (L) 12/10/2024     BUN 18 12/10/2024     CREATININE 1.01 12/10/2024         [unfilled]   [unfilled]   Susceptibility data from last 90 days.  Collected Specimen Info Organism   12/07/24 Fluid from SPUTUM Normal throat florin                   X-rays/ Images     [unfilled]   Radiology Results (last 21 days)     Procedure Component Value Units Date/Time   CT chest w IV contrast [[id number]] Collected: 12/09/24 0732   Order Status: Completed Updated: 12/09/24 0732   Narrative:     Interpreted By:  Ella Samuels,  STUDY:  CT CHEST W IV CONTRAST;  12/8/2024 9:02 pm      INDICATION:  Signs/Symptoms:Shortness of breath.          COMPARISON:  None.      ACCESSION NUMBER(S):  [id number]      ORDERING CLINICIAN:  JULITA CARTER      TECHNIQUE:  Helical data acquisition of the chest was obtained following  intravenous administration of 50 ML Omnipaque 350.  Images were  reformatted in axial, coronal, and sagittal planes.      FINDINGS:  LUNGS AND AIRWAYS:  The trachea and central  airways are patent. No endobronchial lesion  is seen. Bilateral lower lobe peripheral endobronchial mucous plugs  are noted with the associated peribronchial inflammation.      Moderate atelectasis is present in the right middle lobe. There is a  small to moderate right pleural effusion with the accompanying right  lower lobe atelectasis dependently. Minimal atelectasis is noted left  lung base. 1.2 cm densely calcified nodule is present medial right  lung base (image 220 of 319 feces.          MEDIASTINUM AND JASWINDER, LOWER NECK AND AXILLA:  The visualized thyroid gland is within normal limits.  1 cm right paratracheal lymph node is noted. A few subcentimeter  mediastinal lymph nodes are also visible. Calcified lymph nodes are  noted subcarinal region and lower right hilum Esophagus appears  within normal limits as seen.      HEART AND VESSELS:  The thoracic aorta normal in course and caliber.  Main pulmonary artery and its branches are normal in caliber.  No coronary artery calcifications are seen. Please note, the study is  not optimized for evaluation of coronary arteries. The cardiac  chambers are not enlarged. There is no pericardial effusion seen.      UPPER ABDOMEN:  2.5 cm cyst is noted upper left kidney              CHEST WALL AND OSSEOUS STRUCTURES:  Chest wall is within normal limits.  No acute osseous pathology.There are no suspicious osseous lesions.       Impression:     1.  Small-to-moderate right pleural effusion, moderate right middle  lobe atelectasis and mild lower lobe atelectasis bilaterally      Signed by: Ella Samuels 12/9/2024 7:31 AM  Dictation workstation:   [id number]   XR chest 2 views [[id number]] Collected: 12/07/24 0939   Order Status: Completed Updated: 12/07/24 0939   Narrative:     Interpreted By:  Chelly Marquez,  STUDY:  XR CHEST 2 VIEWS;  12/7/2024 8:31 am      INDICATION:  Signs/Symptoms:cough, hypoxia.      COMPARISON:  None.      ACCESSION NUMBER(S):  [id number]       ORDERING CLINICIAN:  TRUDY TODD      FINDINGS:  CARDIOMEDIASTINAL SILHOUETTE:  The heart is enlarged.          LUNGS:  There is a small right pleural effusion.      ABDOMEN:  No remarkable upper abdominal findings.          BONES:  No acute osseous changes.       Impression:     Small right pleural effusion.      MACRO:  None      Signed by: Chelly Marquez 12/7/2024 9:38 AM  Dictation workstation:   [id number]                  Medical Problems         Problem List         * (Principal) Acute respiratory failure with hypoxia (Multi)     Benign essential hypertension (Chronic)     Bilateral lower extremity edema (Chronic)     Chronic constipation (Chronic)     Chronic low back pain     Diverticulosis of colon     Hyperlipidemia     Lumbar radiculopathy     Mitral valve insufficiency     Mild vitamin D deficiency     Venous reflux     Medicare annual wellness visit, subsequent     Abnormal ankle brachial index (AMANDA) (Chronic)     Overview Signed 5/1/2024  4:31 PM by Laurie Avina DO       AMANDA performed 09/02/2022 revealed 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            Alterations of sensations     Fall     Stage 3b chronic kidney disease (Multi) (Chronic)     Claudication of lower extremity (CMS-HCC)     Disorder of skin of upper extremity     History of cataract     Pain of lower extremity     Right foot pain     Urinary tract infection     Atherosclerosis of native artery of both lower extremities (CMS-HCC)     Onychomycosis                  Above medical problems may be reflective of historical medical problems that may have resolved and may not related to acute clinical condition/medical problems.     Clinical impression/plan:        Acute respiratory failure with hypoxia  Continue  empiric doxycycline and ceftriaxone, continue Mucinex and Duoneb, sputum cx pending  CT  of chest showing small to moderate right-sided effusion bilateral lower lobe endobronchial mucous plugs  I educated pt on incentive spirometry 10x/hour  Will add flutter valve therapy to improve expectoration  Wean O2 off as tolerated to maintain O2 sat >91%, pt may need home O2 eval      Hx chronic leg edema, probable diastolic heart failure  Cardiology consulted, pt follows with them for edema  AMANDA 9/2/2022 revealed moderate LLE arterial occlusive disease significant stenosis and/or occlusion at the left supra-popliteal artery  Venous insufficiency reflux ultrasound 7/22/2022 showed bilateral saphenofemoral vein reflux  Echo 4/20/2022 showed normal LV systolic function with EF of 70% and severely reduced RVSP  Repeat echo revealed reduced RV function, normal EF  Continue IV Lasix daily for now     Hypokalemia  -Supplement potassium     Atrial fib with RVR  Spontaneously converted in ED, now rate controlled  Continue metoprolol and apixaban     CKD, at baseline     DVT ppx: apixaban        Code Status: Full Code         Disposition/additional care plan/interventions: 12/10/2024     As needed melatonin for insomnia.     Agree with flutter valve     Continue monitor respiratory status closely     IV diuresis duration as per cardiology     Hypokalemia.............. supplement potassium     Monitor surrogate magnesium levels     2D echocardiogram reviewed, EF preserved, severely reduced right ventricular systolic function     Permanent A-fib with acceptable ventricular response, continue rate control therapy and anticoagulation with Eliquis         Disposition/additional care plan/interventions: 12/11/2024     Continue diuresis as per cardiology today monitor clinical response.     Patient appeared clinically improved adventitious sounds appreciated on auscultation of the lungs today but less pronounced than in the last 24 hours.         Further recommendations forthcoming in accordance with patient's clinical  disposition and response to care.     Discharge planning:Discharge timing in accordance with recommendation by cardiology, continue IV diuretics, possible discharge in the next 24 hours depending on clinical response.     Care time:> 35 mins.        Discharge plannin2024 plan discharge home today with family.        Close follow-up with cardiology and monitoring of renal function recommended.  Patient should seek medical attention immediately if condition should worsen, new symptoms develop , no further improvement or recurrence of presenting symptomatology, patient warned.    Patient should seek medical attention immediately if condition should worsen, new symptoms develop , no further improvement or recurrence of presenting symptomatology, patient warned.              Dictation performed with assistance of voice recognition device therefore transcription errors are possible.

## 2024-12-12 NOTE — PROGRESS NOTES
"Subjective Data:  Today, Ms. Roberto is sitting at side of bed, daughter-in-law at bedside. Reports she continues to have shortness of breath that is unchanged, no chest pain but endorses chronic chest heaviness that has been ongoing for \"months\", no dizziness or lightheadedness. Monitor demonstrates AF with rates in the 90s.     12/10/24: Sitting up at side of bed, no acute distress. Patient denies chest pain or pressure, shortness of breath is reported as improved, no dizziness or lightheadedness. Monitor demonstrates AF with rates in 90s this morning. No labs for review today.  12-11-24: Daughter at bedside.  Had good night.  No CP/pressure.  Dyspnea has improved, on room air.  Still with productive cough. K 3.3, creatinine 1.01.  Monitor: Afib.  12-12-24: No issues overnight.  No CP/dyspnea/orthopnea.  Remains on room air.  Monitor: Afib  Creatinine 1.27.  Changed to oral lasix starting today.     Overnight Events:    None     Objective Data:  Last Recorded Vitals:  Vitals:    12/11/24 2100 12/11/24 2351 12/12/24 0418 12/12/24 0801   BP:  97/60 101/69 115/68   BP Location:    Right arm   Patient Position:  Lying Lying Lying   Pulse:  91 82 86   Resp:  17 17 16   Temp:  36.4 °C (97.6 °F) 36.4 °C (97.6 °F) 37 °C (98.6 °F)   TempSrc:  Temporal Temporal Temporal   SpO2: 93% 91% 96% 90%   Weight:   53.3 kg (117 lb 8.1 oz)    Height:           Last Labs:  Results from last 7 days   Lab Units 12/12/24  0345 12/11/24  1947 12/10/24  0929   SODIUM mmol/L 137 137 135*   POTASSIUM mmol/L 4.7 4.3 3.3*   CHLORIDE mmol/L 98 96* 97*   CO2 mmol/L 32 32 30   BUN mg/dL 24* 21 18   CREATININE mg/dL 1.27* 1.29* 1.01   GLUCOSE mg/dL 88 134* 161*   CALCIUM mg/dL 9.0 9.0 8.5*     Results from last 7 days   Lab Units 12/09/24  0353   WBC AUTO x10*3/uL 10.1   HEMOGLOBIN g/dL 11.1*   HEMATOCRIT % 33.8*   PLATELETS AUTO x10*3/uL 130*         TROPHS   Date/Time Value Ref Range Status   12/07/2024 08:48 AM 18 0 - 13 ng/L Final   12/07/2024 " 07:59 AM 18 0 - 13 ng/L Final     BNP   Date/Time Value Ref Range Status   12/07/2024 07:59  0 - 99 pg/mL Final     HGBA1C   Date/Time Value Ref Range Status   08/17/2020 09:06 AM 5.8 % Final     Comment:          Diagnosis of Diabetes-Adults   Non-Diabetic: < or = 5.6%   Increased risk for developing diabetes: 5.7-6.4%   Diagnostic of diabetes: > or = 6.5%  .       Monitoring of Diabetes                Age (y)     Therapeutic Goal (%)   Adults:          >18           <7.0   Pediatrics:    13-18           <7.5                   7-12           <8.0                   0- 6            7.5-8.5   American Diabetes Association. Diabetes Care 33(S1), Jan 2010.       LDLCALC   Date/Time Value Ref Range Status   04/30/2024 07:32  <=99 mg/dL Final     Comment:                                 Near   Borderline      AGE      Desirable  Optimal    High     High     Very High     0-19 Y     0 - 109     ---    110-129   >/= 130     ----    20-24 Y     0 - 119     ---    120-159   >/= 160     ----      >24 Y     0 -  99   100-129  130-159   160-189     >/=190       VLDL   Date/Time Value Ref Range Status   04/30/2024 07:32 AM 16 0 - 40 mg/dL Final   05/01/2023 08:40 AM 19 0 - 40 mg/dL Final      Last I/O:  I/O last 3 completed shifts:  In: 300 (5.6 mL/kg) [IV Piggyback:300]  Out: - (0 mL/kg)   Weight: 53.3 kg     Past Cardiology Tests (Last 3 Years):    Echo:  Transthoracic Echo (TTE) Complete 12/07/2024   1. Left ventricular ejection fraction is normal, calculated by Alfred's biplane at 70%.   2. Left ventricular diastolic filling was indeterminate.   3. There is severely reduced right ventricular systolic function.   4. The left atrium is moderately dilated.   5. Mild to moderate tricuspid regurgitation.  Ejection Fractions:  EF   Date/Time Value Ref Range Status   12/07/2024 11:22 AM 70 %      CT Chest:  IMPRESSION:  1.  Small-to-moderate right pleural effusion, moderate right middle  lobe atelectasis and mild lower  lobe atelectasis bilaterally    Inpatient Medications:  Scheduled medications   Medication Dose Route Frequency    apixaban  2.5 mg oral BID    aspirin  81 mg oral Daily    cefTRIAXone  1 g intravenous q24h    doxycycline  100 mg intravenous q12h    empagliflozin  10 mg oral Daily    furosemide  40 mg oral Daily    guaiFENesin  600 mg oral BID    magnesium oxide  400 mg oral BID    melatonin  3 mg oral Daily    metoprolol tartrate  50 mg oral BID    perflutren lipid microspheres  0.5-10 mL of dilution intravenous Once in imaging    perflutren lipid microspheres  0.5-10 mL of dilution intravenous Once in imaging    polyethylene glycol  17 g oral Daily    sulfur hexafluoride microsphr  2 mL intravenous Once in imaging     PRN medications   Medication    acetaminophen    Or    acetaminophen    Or    acetaminophen    ipratropium-albuteroL    oxygen     Continuous Medications   Medication Dose Last Rate       Physical Exam:  Physical Exam  Vitals reviewed.   Constitutional:       Appearance: Normal appearance.   HENT:      Head: Normocephalic.      Mouth/Throat:      Mouth: Mucous membranes are moist.   Cardiovascular:      Rate and Rhythm: Normal rate. Rhythm irregularly irregular.      Pulses: Normal pulses.      Heart sounds: Normal heart sounds. No murmur heard.     No friction rub. No gallop.   Pulmonary:      Effort: Pulmonary effort is normal.      Breath sounds: No wheezing, rhonchi or rales.      Comments: R base diminished, mild wheeze  Abdominal:      General: Bowel sounds are normal.      Palpations: Abdomen is soft.   Musculoskeletal:         General: Normal range of motion.      Cervical back: Normal range of motion.      Right lower leg: No edema.      Left lower leg: No edema.   Skin:     General: Skin is warm and dry.   Neurological:      General: No focal deficit present.      Mental Status: She is alert and oriented to person, place, and time.   Psychiatric:         Mood and Affect: Mood normal.          Behavior: Behavior normal.         Thought Content: Thought content normal.         Judgment: Judgment normal.           Assessment/Plan   1) Shortness of breath  I suspect underlying pulmonary causes along with diastolic dysfunction (? Restrictive Cardiomyopathy of elderly)  On IV diuresis, Metoprolol  Echo with normal LV function and no sig valvular disease    12/9/24: Remains with shortness of breath that she reports is unchanged since admission. CT chest with small-to-moderate right pleural effusion, moderate right middle lobe atelectasis and mild lower lobe atelectasis bilaterally. Recommend to continue on IV diuretic as ordered. Cr worsening today to 1.14 fro 0.87. Recommend pulmonary hygiene per RT discretion. Continue to monitor strict I/O, daily BMP and Mg.     12/10/24: Shortness of breath has improved, continues to have faint bibasilar rales >L. Received IV diuretic this morning, no labs for review, labs ordered. Will add SGLT2i today. Continue to monitor strict I/O, daily BMP and Mg. Continue on IV antibiotics, pulmonary hygiene per medicine service.     12-11-24: Combination of RHF and PNA: Will continue on IV Lasix today and change to oral tomorrow.  She should be on low sodium diet. Continue on jardiance, and metoprolol. On ATB per IMS.    12-12-24: No issues overnight.  Looks compensated.  Would conitnue on 40 mg Lasix on d/c and will reassess dose in the outpt setting (on 20mg daily usually). I stressed importance of low sodium diet.     2) Permanent Atrial fib  Rates controlled  On Eliquis    12/9/24: Stable, rate controlled.  12/10/24: Rate controlled, continue on metoprolol tartrate 50 mg BID and Apixaban 2.5 mg twice daily for thromboembolism ppx.   12-11-24: Rate controlled Afib.  Continue on BB and Eliquis.   12-12-24: Remains rate controlled. Continue BB and Eliquis on d/c     3) Acute on  Chronic right heart failure  I suspect due to restrictive CMP and ? Chronic hypoxia from Lungs  Check  CT chest to R/O pulmonary pathology  May consider Pulmonary consult    12/9/24: CT as above, recommend to continue on IV diuretics as ordered.   12/10/24: Continues on IV diuretic, added SGLT2i today. Labs ordered and pending today.  12-11-24: IV lasix today and change to oral tomorrow. Continue on Jardiance.   12-12-24: On oral Lasix and Jardiance: continue on d/c.    OK for d/c from cardiac standpoint  Will arrange outpt follow up.       Code Status:  Full Code    Maddi Conroy, APRN-CNP

## 2024-12-12 NOTE — DISCHARGE SUMMARY
Discharge Summary    Linda Roberto    [id number]     12/7/2024  7:42 AM , admit date    12/12/2024, discharge date      .      Discharge Diagnosis:          1.  Acute respiratory failure with hypoxia    2.  Diastolic heart failure decompensation    3.  Paroxysmal atrial fibrillation    4.  Hypokalemia    5.  Chronic kidney disease        Problem List Items Addressed This Visit             ICD-10-CM       Pulmonary and Pneumonias    * (Principal) Acute respiratory failure with hypoxia (Multi) - Primary J96.01    Relevant Orders    Transthoracic Echo (TTE) Complete (Completed)     Other Visit Diagnoses         Codes    Acute pulmonary edema     J81.0    SIRS (systemic inflammatory response syndrome) (Multi)     R65.10               Hospital Course/Progress note on the date of  discharge.   Linda Roberto [id number]   Service: Internal Medicine / Hospitalist Date of service: 12/12/2024                                  Full Code               Subjective     Linda Roberto is a 89 y.o. female with PMHx of atrial fib on apixaban, HTN, mitral valve insufficiency and chronic bilateral leg edema who presented with cough and SOB for three days. O2 sats in triage were 80-84% on RA.  Family at bedside assist with HPI and say she has had dyspnea and moist cough, lives alone.  She was initially in afib RVR which has resolved. She is not on home O2. She says her leg edema is no worse recently. She has not slept upright since September due to chest pressure, which she denies otherwise. In the ED her clinical picture was concerning for pneumonia as well as pulmonary edema and she was started on IV Abx and placed on oxygen.  Remainder of ROS reviewed and negative except as indicated in HPI.      ED Course:  Vitals on presentation: T 36.8C>38.2C P 104>126 RR 20>32 /83 O2 82/RA  Remarkable labs: creatinine 1.25, , WBC 13.8k, lactate 1.5  EKG: atrial fibrillation with RVR. No STEMI. Normal axis.   Imaging: CXR: Small right pleural  effusion   Interventions: Blood cultures, doxycycline, ceftriaxone, IV Lasix, IV magnesium     12/09/24: Patient seen by cardiology  Echo showing normal LV function no significant valvular disease patient continued on diuretics she continues to complain of ongoing cough.  She has been afebrile there is no leukocytosis.  A CT scan of the chest was done there is evidence of small to moderate  right pleural effusion and right middle lobe atelectasis and bilateral lower lobe atelectasis.        12/10........Patient reported she was feeling better express her desire to be discharged soon.  No reported ; palpitations, headaches, chest pains, nausea or vomiting.        12/11................No new complaints voiced by patient at the time of evaluation.  No reported headaches, chest pains, palpitations, nausea or vomiting.     12/12.......................Patient reported feeling well this morning.  She appeared excited about possible discharge home today no acute complaints voiced by patient.  Patient seen and examined in presence of her nurse.  No reported: chest pains, headaches, fevers, chills, nausea  or vomiting.     Review of Systems:   Review of system otherwise negative if not aforementioned above in subjective.     Objective        Physical Exam      Constitutional:       Appearance: Patient appeared in no acute cardiopulmonary distress.     Comments: Patient alert and oriented to person place time and situation.Patient appeared clinically improved was able to speak in full sentences.  HEENT:      Head: Normocephalic and atraumatic.Trachea midline      Nose:No observed congestion or rhinorrhea.     Mouth/Throat: Mucous membranes Moist, Trachea appeared  midline.  Eyes:      Extraocular Movements: Extraocular movements intact.      Pupils: Pupils are equal, round, and reactive to light.      Comments: No scleral icterus or conjunctival injection appreciated.   Cardiovascular:      Rate and Rhythm: Irregular rate and  rhythm.No clicks rubs or gallops.No peripheral stigmata of endocarditis appreciated.     Pulmonary:      Lung bases diminished without appreciation of adventitious sounds.  Abdominal:      General: Abdomen soft, nontender, active bowel sounds, no involuntary guarding or rebound tenderness appreciated.     Comments: None   Musculoskeletal:       Patient appeared to have full active range of motion for upper and lower extremities, no acute apparent joint deformity appreciated on examination.   No pitting edema or cyanosis appreciated.       Lymphadenopathy:      No appreciable palpable lymphadenopathy  Skin:     General: Skin is warm.      Coloration:  No jaundice     Findings: No abnormal appearing skin rashes or lesions that appeared acute noted on unclothed area of the skin..   Neurological:      General: No focal sensory or motor deficits appreciated, no meningeal signs or dysmetria noted.      Cranial Nerves: Cranial nerves II to XII appearing grossly intact.     Genitals:  Deferred  Psychiatric:         The patient appears to be displaying normal mood and affect at the time of evaluation.     Labs:               Lab Results   Component Value Date     GLUCOSE 88 12/09/2024     CALCIUM 8.7 12/09/2024      (L) 12/09/2024     K 3.6 12/09/2024     CO2 27 12/09/2024     CL 96 (L) 12/09/2024     BUN 22 12/09/2024     CREATININE 1.14 (H) 12/09/2024                Lab Results   Component Value Date     WBC 10.1 12/09/2024     HGB 11.1 (L) 12/09/2024     HCT 33.8 (L) 12/09/2024     MCV 92 12/09/2024      (L) 12/09/2024                Lab Results   Component Value Date     GLUCOSE 161 (H) 12/10/2024     CALCIUM 8.5 (L) 12/10/2024      (L) 12/10/2024     K 3.3 (L) 12/10/2024     CO2 30 12/10/2024     CL 97 (L) 12/10/2024     BUN 18 12/10/2024     CREATININE 1.01 12/10/2024         [unfilled]   [unfilled]   Susceptibility data from last 90 days.  Collected Specimen Info Organism   12/07/24  Fluid from SPUTUM Normal throat florin                   X-rays/ Images     [unfilled]   Radiology Results (last 21 days)     Procedure Component Value Units Date/Time   CT chest w IV contrast [[id number]] Collected: 12/09/24 0732   Order Status: Completed Updated: 12/09/24 0732   Narrative:     Interpreted By:  Ella Samuels,  STUDY:  CT CHEST W IV CONTRAST;  12/8/2024 9:02 pm      INDICATION:  Signs/Symptoms:Shortness of breath.          COMPARISON:  None.      ACCESSION NUMBER(S):  [id number]      ORDERING CLINICIAN:  JULITA CARTER      TECHNIQUE:  Helical data acquisition of the chest was obtained following  intravenous administration of 50 ML Omnipaque 350.  Images were  reformatted in axial, coronal, and sagittal planes.      FINDINGS:  LUNGS AND AIRWAYS:  The trachea and central airways are patent. No endobronchial lesion  is seen. Bilateral lower lobe peripheral endobronchial mucous plugs  are noted with the associated peribronchial inflammation.      Moderate atelectasis is present in the right middle lobe. There is a  small to moderate right pleural effusion with the accompanying right  lower lobe atelectasis dependently. Minimal atelectasis is noted left  lung base. 1.2 cm densely calcified nodule is present medial right  lung base (image 220 of 319 feces.          MEDIASTINUM AND JASWINDER, LOWER NECK AND AXILLA:  The visualized thyroid gland is within normal limits.  1 cm right paratracheal lymph node is noted. A few subcentimeter  mediastinal lymph nodes are also visible. Calcified lymph nodes are  noted subcarinal region and lower right hilum Esophagus appears  within normal limits as seen.      HEART AND VESSELS:  The thoracic aorta normal in course and caliber.  Main pulmonary artery and its branches are normal in caliber.  No coronary artery calcifications are seen. Please note, the study is  not optimized for evaluation of coronary arteries. The cardiac  chambers are not enlarged. There is no  pericardial effusion seen.      UPPER ABDOMEN:  2.5 cm cyst is noted upper left kidney              CHEST WALL AND OSSEOUS STRUCTURES:  Chest wall is within normal limits.  No acute osseous pathology.There are no suspicious osseous lesions.       Impression:     1.  Small-to-moderate right pleural effusion, moderate right middle  lobe atelectasis and mild lower lobe atelectasis bilaterally      Signed by: Ella Samuels 12/9/2024 7:31 AM  Dictation workstation:   [id number]   XR chest 2 views [[id number]] Collected: 12/07/24 0939   Order Status: Completed Updated: 12/07/24 0939   Narrative:     Interpreted By:  Chelly Marquez,  STUDY:  XR CHEST 2 VIEWS;  12/7/2024 8:31 am      INDICATION:  Signs/Symptoms:cough, hypoxia.      COMPARISON:  None.      ACCESSION NUMBER(S):  [id number]      ORDERING CLINICIAN:  TRUDY TODD      FINDINGS:  CARDIOMEDIASTINAL SILHOUETTE:  The heart is enlarged.          LUNGS:  There is a small right pleural effusion.      ABDOMEN:  No remarkable upper abdominal findings.          BONES:  No acute osseous changes.       Impression:     Small right pleural effusion.      MACRO:  None      Signed by: Chelly Marquez 12/7/2024 9:38 AM  Dictation workstation:   [id number]                  Medical Problems         Problem List              * (Principal) Acute respiratory failure with hypoxia (Multi)      Benign essential hypertension (Chronic)      Bilateral lower extremity edema (Chronic)      Chronic constipation (Chronic)      Chronic low back pain      Diverticulosis of colon      Hyperlipidemia      Lumbar radiculopathy      Mitral valve insufficiency      Mild vitamin D deficiency      Venous reflux      Medicare annual wellness visit, subsequent      Abnormal ankle brachial index (AMANDA) (Chronic)      Overview Signed 5/1/2024  4:31 PM by Laurie Avina DO        AMANDA performed 09/02/2022 revealed moderate arterial occlusive disease in the LLE and Doppler waveforms suggestive of a  hemodynamically significant stenosis and/or occlusion at the left supra-popliteal artery.            Alterations of sensations      Fall      Stage 3b chronic kidney disease (Multi) (Chronic)      Claudication of lower extremity (CMS-HCC)      Disorder of skin of upper extremity      History of cataract      Pain of lower extremity      Right foot pain      Urinary tract infection      Atherosclerosis of native artery of both lower extremities (CMS-HCC)      Onychomycosis                   Above medical problems may be reflective of historical medical problems that may have resolved and may not related to acute clinical condition/medical problems.     Clinical impression/plan:        Acute respiratory failure with hypoxia  Continue  empiric doxycycline and ceftriaxone, continue Mucinex and Duoneb, sputum cx pending  CT of chest showing small to moderate right-sided effusion bilateral lower lobe endobronchial mucous plugs  I educated pt on incentive spirometry 10x/hour  Will add flutter valve therapy to improve expectoration  Wean O2 off as tolerated to maintain O2 sat >91%, pt may need home O2 eval      Hx chronic leg edema, probable diastolic heart failure  Cardiology consulted, pt follows with them for edema  AMANDA 9/2/2022 revealed moderate LLE arterial occlusive disease significant stenosis and/or occlusion at the left supra-popliteal artery  Venous insufficiency reflux ultrasound 7/22/2022 showed bilateral saphenofemoral vein reflux  Echo 4/20/2022 showed normal LV systolic function with EF of 70% and severely reduced RVSP  Repeat echo revealed reduced RV function, normal EF  Continue IV Lasix daily for now     Hypokalemia  -Supplement potassium     Atrial fib with RVR  Spontaneously converted in ED, now rate controlled  Continue metoprolol and apixaban     CKD, at baseline     DVT ppx: apixaban        Code Status: Full Code         Disposition/additional care plan/interventions: 12/10/2024     As needed  melatonin for insomnia.     Agree with flutter valve     Continue monitor respiratory status closely     IV diuresis duration as per cardiology     Hypokalemia.............. supplement potassium     Monitor surrogate magnesium levels     2D echocardiogram reviewed, EF preserved, severely reduced right ventricular systolic function     Permanent A-fib with acceptable ventricular response, continue rate control therapy and anticoagulation with Eliquis         Disposition/additional care plan/interventions: 2024     Continue diuresis as per cardiology today monitor clinical response.     Patient appeared clinically improved adventitious sounds appreciated on auscultation of the lungs today but less pronounced than in the last 24 hours.         Further recommendations forthcoming in accordance with patient's clinical disposition and response to care.     Discharge planning:Discharge timing in accordance with recommendation by cardiology, continue IV diuretics, possible discharge in the next 24 hours depending on clinical response.     Care time:> 35 mins.           Discharge plannin2024 plan discharge home today with family.           Close follow-up with cardiology and monitoring of renal function recommended.  Patient should seek medical attention immediately if condition should worsen, new symptoms develop , no further improvement or recurrence of presenting symptomatology, patient warned.     Patient should seek medical attention immediately if condition should worsen, new symptoms develop , no further improvement or recurrence of presenting symptomatology, patient warned.              Dictation performed with assistance of voice recognition device therefore transcription errors are possible.     Consultants    Cardiology           Surgeries/Procedures:    None             Your medication list        ASK your doctor about these medications        Instructions Last Dose Given Next Dose Due   apixaban  2.5 mg tablet  Commonly known as: Eliquis      Take 1 tablet (2.5 mg) by mouth 2 times a day.       furosemide 20 mg tablet  Commonly known as: Lasix      Take 1 tablet (20 mg) by mouth once daily.       metoprolol tartrate 50 mg tablet  Commonly known as: Lopressor      Take 1 tablet by mouth 2 times a day.                   Disposition/Additional Hospital course.    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  She was placed on IV diuresis and responded well to such interventions.  She continued progress daily atrial the hospital course addition of SGL T2 per cardiology during hospital course .  Empirical antibiotics were initiated during hospitalization with reported right middle lobe atelectasis.  She appears afebrile and received his 5-day course of antibiotic therapy.  Will monitor off antibiotic therapy on discharge.  Recommend close follow-up as well with family physician concerning escalation in daily Lasix therapy to 40 mg orally daily patient may have to endure some degree of renal insufficiency to prevent volume overload.  Patient to be discharged today on Jardiance and Lasix as directed by cardiology.  She appeared hemodynamically stable and clinically fit for discharge.  Patient in agreement with discharge.  Patient should seek medical attention immediately if condition should worsen, new symptoms develop , no further improvement or recurrence of presenting symptomatology, patient warned.    Follow-up:    Follow -up with family physician within one week. Follow-up with cardiology recommended.Outpatient follow-up with primary care physician concerning repeating chest imaging for clearance.  Will monitor off antibiotics at this time.      Discharge Time : 32 mins      Patient should seek medical attention immediately if condition should worsen , presenting  symptoms/conditions do not resolve or new symptoms should developed,patient warned.     Dictation performed with assistance of voice recognition device therefore transcription errors are possible.

## 2024-12-13 ENCOUNTER — PATIENT OUTREACH (OUTPATIENT)
Dept: PRIMARY CARE | Facility: CLINIC | Age: 89
End: 2024-12-13
Payer: MEDICARE

## 2024-12-13 NOTE — PROGRESS NOTES
Discharge Facility: [facility]   Discharge Diagnosis: Acute respiratory failure with hypoxia; Acute pulmonary edema; SIRS (systemic inflammatory response syndrome); Acute on chronic congestive heart failure, unspecified heart failure type; Nonintractable headache, unspecified chronicity pattern, unspecified headache type   Admission Date:  12/7/2024   Discharge Date:  12/12/2024     PCP Appointment Date: 12/20/2024  Specialist Appointment Date: TBD  Hospital Encounter and Summary Linked: Yes  See discharge assessment below for further details  Engagement  Call Start Time: 1113 (12/13/2024 11:28 AM)    Medications  Medications reviewed with patient/caregiver?: Yes (meds discussed with patient's family) (12/13/2024 11:28 AM)  Is the patient having any side effects they believe may be caused by any medication additions or changes?: No (12/13/2024 11:28 AM)  Does the patient have all medications ordered at discharge?: Yes (12/13/2024 11:28 AM)  Care Management Interventions: No intervention needed (12/13/2024 11:28 AM)  Prescription Comments: see med list (furosemide; farxiga; tylenol) (12/13/2024 11:28 AM)  Is the patient taking all medications as directed (includes completed medication regime)?: Yes (12/13/2024 11:28 AM)    Appointments  Does the patient have a primary care provider?: Yes (12/13/2024 11:28 AM)  Care Management Interventions: Verified appointment date/time/provider (12/13/2024 11:28 AM)  Has the patient kept scheduled appointments due by today?: Yes (12/13/2024 11:28 AM)  Care Management Interventions: Advised patient to keep appointment; Advised to schedule with specialist (12/13/2024 11:28 AM)    Self Management  What is the home health agency?: denies need (12/13/2024 11:28 AM)  What Durable Medical Equipment (DME) was ordered?: n/a (12/13/2024 11:28 AM)    Patient Teaching  Does the patient have access to their discharge instructions?: Yes (12/13/2024 11:28 AM)  Care Management  Interventions: Reviewed instructions with patient (12/13/2024 11:28 AM)  What is the patient's perception of their health status since discharge?: Improving (12/13/2024 11:28 AM)  Is the patient/caregiver able to teach back the hierarchy of who to call/visit for symptoms/problems? PCP, Specialist, Home Health nurse, Urgent Care, ED, 911: Yes (12/13/2024 11:28 AM)  Patient/Caregiver Education Comments: Successful transition of care outreach with the patient's family member, Maci, with permission. The patient reports doing well at home since discharge. New meds/changes were reviewed with Maci during outreach. The patient denies CP/SOB and has been using her incentive spirometer at home. Patient and family denies further discharge questions/concerns/needs during outreach call. Emphasized that follow-up appts are needed after discharge with PCP and reviewed needed follow-ups with any specialties to assess response to treatment from hospitalization. Maci is aware of my availability for non-emergent concerns. Contact information was provided to the patient's family. (12/13/2024 11:28 AM)

## 2024-12-15 LAB
ATRIAL RATE: 123 BPM
PR INTERVAL: 34 MS
Q ONSET: 252 MS
QRS COUNT: 16 BEATS
QRS DURATION: 92 MS
QT INTERVAL: 350 MS
QTC CALCULATION(BAZETT): 469 MS
QTC FREDERICIA: 425 MS
R AXIS: 5 DEGREES
T AXIS: 26 DEGREES
T OFFSET: 427 MS
VENTRICULAR RATE: 108 BPM

## 2024-12-16 ENCOUNTER — TELEPHONE (OUTPATIENT)
Dept: CARDIOLOGY | Facility: HOSPITAL | Age: 89
End: 2024-12-16
Payer: MEDICARE

## 2024-12-16 NOTE — TELEPHONE ENCOUNTER
RN called pt and [facility] at this time. They had patients Eliquis as once daily for only 45 days. RN stated that is incorrect it needs to be 2.5 mg twice daily for 90 days with 3 refills. They were able to get it corrected and RN asked Maci Daughter-in-law pt has been taking her eliquis twice daily like she should and they didn't give her any refills. RN stated she will get a new 90 supply for twice daily and 3 refills. RN stated if she has any other issues with it when it comes to please call the office back at this time.

## 2024-12-16 NOTE — TELEPHONE ENCOUNTER
"apixaban (Eliquis) 2.5 mg tablet   Take 1 tablet (2.5 mg) by mouth 2 times a day     Per patient, \" script Is not worded right can't fill\" and is sent directly by Eliquis\"   "

## 2024-12-20 ENCOUNTER — APPOINTMENT (OUTPATIENT)
Dept: PRIMARY CARE | Facility: CLINIC | Age: 89
End: 2024-12-20
Payer: MEDICARE

## 2024-12-20 ENCOUNTER — HOME HEALTH ADMISSION (OUTPATIENT)
Dept: HOME HEALTH SERVICES | Facility: HOME HEALTH | Age: 89
End: 2024-12-20
Payer: MEDICARE

## 2024-12-20 ENCOUNTER — TELEPHONE (OUTPATIENT)
Dept: PRIMARY CARE | Facility: CLINIC | Age: 89
End: 2024-12-20

## 2024-12-20 ENCOUNTER — DOCUMENTATION (OUTPATIENT)
Dept: HOME HEALTH SERVICES | Facility: HOME HEALTH | Age: 89
End: 2024-12-20

## 2024-12-20 ENCOUNTER — TELEPHONE (OUTPATIENT)
Dept: HOME HEALTH SERVICES | Facility: HOME HEALTH | Age: 89
End: 2024-12-20

## 2024-12-20 VITALS
OXYGEN SATURATION: 94 % | DIASTOLIC BLOOD PRESSURE: 63 MMHG | BODY MASS INDEX: 22.47 KG/M2 | HEIGHT: 61 IN | WEIGHT: 119 LBS | SYSTOLIC BLOOD PRESSURE: 115 MMHG | HEART RATE: 78 BPM

## 2024-12-20 DIAGNOSIS — Z92.89 HOSPITALIZATION WITHIN LAST 30 DAYS: Primary | ICD-10-CM

## 2024-12-20 DIAGNOSIS — R44.3 HALLUCINATIONS: ICD-10-CM

## 2024-12-20 DIAGNOSIS — L89.306 PRESSURE INJURY OF DEEP TISSUE OF BUTTOCK, UNSPECIFIED LATERALITY: ICD-10-CM

## 2024-12-20 DIAGNOSIS — Z23 NEEDS FLU SHOT: ICD-10-CM

## 2024-12-20 DIAGNOSIS — R63.4 WEIGHT LOSS: ICD-10-CM

## 2024-12-20 DIAGNOSIS — R39.15 URINARY URGENCY: ICD-10-CM

## 2024-12-20 DIAGNOSIS — I10 BENIGN ESSENTIAL HYPERTENSION: Chronic | ICD-10-CM

## 2024-12-20 DIAGNOSIS — E87.6 HYPOKALEMIA: ICD-10-CM

## 2024-12-20 DIAGNOSIS — N18.32 STAGE 3B CHRONIC KIDNEY DISEASE (MULTI): Chronic | ICD-10-CM

## 2024-12-20 DIAGNOSIS — I50.22 CHRONIC SYSTOLIC CONGESTIVE HEART FAILURE: Chronic | ICD-10-CM

## 2024-12-20 LAB
POC APPEARANCE, URINE: CLEAR
POC BILIRUBIN, URINE: NEGATIVE
POC BLOOD, URINE: NEGATIVE
POC COLOR, URINE: ABNORMAL
POC GLUCOSE, URINE: ABNORMAL MG/DL
POC KETONES, URINE: NEGATIVE MG/DL
POC LEUKOCYTES, URINE: NEGATIVE
POC NITRITE,URINE: NEGATIVE
POC PH, URINE: 7.5 PH
POC PROTEIN, URINE: NEGATIVE MG/DL
POC SPECIFIC GRAVITY, URINE: 1.02
POC UROBILINOGEN, URINE: 0.2 EU/DL

## 2024-12-20 PROCEDURE — 87086 URINE CULTURE/COLONY COUNT: CPT

## 2024-12-20 RX ORDER — METOPROLOL TARTRATE 50 MG/1
50 TABLET ORAL 2 TIMES DAILY
Qty: 180 TABLET | Refills: 3 | Status: SHIPPED | OUTPATIENT
Start: 2024-12-20

## 2024-12-20 RX ORDER — FOAM BANDAGE 9.2" X9.2"
1 BANDAGE TOPICAL DAILY
Qty: 30 EACH | Refills: 3 | Status: SHIPPED | OUTPATIENT
Start: 2024-12-20

## 2024-12-20 RX ORDER — POTASSIUM CHLORIDE 20 MEQ/1
20 TABLET, EXTENDED RELEASE ORAL DAILY
Qty: 30 TABLET | Refills: 11 | Status: SHIPPED | OUTPATIENT
Start: 2024-12-20 | End: 2025-12-20

## 2024-12-20 ASSESSMENT — ENCOUNTER SYMPTOMS
CONFUSION: 0
COUGH: 0
NERVOUS/ANXIOUS: 0
SHORTNESS OF BREATH: 0
DECREASED CONCENTRATION: 1
HALLUCINATIONS: 1
LOSS OF SENSATION IN FEET: 0
FEVER: 0
OCCASIONAL FEELINGS OF UNSTEADINESS: 0
HYPERACTIVE: 0
WHEEZING: 0
DEPRESSION: 0
SLEEP DISTURBANCE: 1
PALPITATIONS: 0
FATIGUE: 1

## 2024-12-20 ASSESSMENT — PATIENT HEALTH QUESTIONNAIRE - PHQ9
1. LITTLE INTEREST OR PLEASURE IN DOING THINGS: NOT AT ALL
SUM OF ALL RESPONSES TO PHQ9 QUESTIONS 1 AND 2: 0
2. FEELING DOWN, DEPRESSED OR HOPELESS: NOT AT ALL

## 2024-12-20 ASSESSMENT — COLUMBIA-SUICIDE SEVERITY RATING SCALE - C-SSRS
1. IN THE PAST MONTH, HAVE YOU WISHED YOU WERE DEAD OR WISHED YOU COULD GO TO SLEEP AND NOT WAKE UP?: NO
6. HAVE YOU EVER DONE ANYTHING, STARTED TO DO ANYTHING, OR PREPARED TO DO ANYTHING TO END YOUR LIFE?: NO
2. HAVE YOU ACTUALLY HAD ANY THOUGHTS OF KILLING YOURSELF?: NO

## 2024-12-20 NOTE — PROGRESS NOTES
"Patient: Linda Roberto  : 1932  PCP: Laurie Avina DO  MRN: [id number]  Program: Transitional Care Management  Status: Enrolled  Effective Dates: 2024 - present  Responsible Staff: Agustin Aguirre RN  Social Drivers to be Addressed: Physical Activity    Linda Roberto is a 89 y.o. female presenting today for follow-up after being discharged from the hospital 8 days ago. The main problem requiring admission was hypoxia. The discharge summary and/or Transitional Care Management documentation was reviewed. Medication reconciliation was performed as indicated via the \"Luis Alberto as Reviewed\" timestamp.     Linda Roberto was contacted by Transitional Care Management services two days after her discharge. This encounter and supporting documentation was reviewed.    Patient admitted - for hypoxia, admitted 80% on RA with leg edema  She was initially in afib RVR which has resolved   In the ED her clinical picture was concerning for pneumonia as well as pulmonary edema and she was started on IV Abx and placed on oxygen  She was diuresed with IV lasix, transitioned to oral, addition of SGLT2    Lasix continued outpatient but it seems K was stopped?     Reporting that she continues to have hallucinations at night even in the hospital but the staff was not alarmed by this  Usually sees little girls at night around her bed and house and hospital   Melatonin initially helped and then stopped    Review of Systems   Constitutional:  Positive for fatigue. Negative for fever.   Respiratory:  Negative for cough, shortness of breath and wheezing.    Cardiovascular:  Negative for chest pain, palpitations and leg swelling.   Allergic/Immunologic: Negative for immunocompromised state.   Psychiatric/Behavioral:  Positive for behavioral problems, decreased concentration, hallucinations and sleep disturbance. Negative for confusion. The patient is not nervous/anxious and is not hyperactive.      /63 (BP Location: Left arm, " "Patient Position: Sitting)   Pulse 78   Ht 1.549 m (5' 1\")   Wt 54 kg (119 lb)   SpO2 94%   BMI 22.48 kg/m²     Physical Exam  Constitutional:       Appearance: Normal appearance.   HENT:      Head: Normocephalic and atraumatic.   Cardiovascular:      Rate and Rhythm: Normal rate.   Pulmonary:      Effort: Pulmonary effort is normal.   Skin:            Comments: Beginnings of pressure ulcer along the sacrum   Neurological:      Mental Status: She is alert and oriented to person, place, and time.   Psychiatric:         Mood and Affect: Mood normal.         Behavior: Behavior normal.         The complexity of medical decision making for this patient's transitional care is high.    Assessment/Plan   Problem List Items Addressed This Visit             ICD-10-CM    Benign essential hypertension (Chronic) I10     Goal BP less than 130/80  Holding well off losartan, continue metoprolol 50mg BID  Work on maintaining a healthy weight, monitoring sodium intake, consistent activity and exercise  Avoid chronic use of NSAIDs  Do not use sudafed/pseudoephedrine for decongestant when ill           Relevant Medications    metoprolol tartrate (Lopressor) 50 mg tablet    Stage 3b chronic kidney disease (Multi) (Chronic) N18.32     GFR trend--> 40  BP-goal < 130/80   On ACE or ARB:  losartan stopped in the hospital due to BP and kidney function  DM II- goal of <7%   On SLGT2: YEs   Avoid nephrotoxic agents/Adjusting nephrotoxic agents--> now on lasix with heart condition, with memory issues working on keeping hydrated   Recommendation healthy physcial activity and heart healthy diet  Hydration with non sugar added or diuretic liquids           Relevant Orders    Comprehensive metabolic panel    Referral to Home Care    Chronic systolic congestive heart failure (Chronic) I50.22     Echo last-2024 EF 70%, severely reduced R ventricular dysfunction    ACE/ARB:No  Arni-No  SGLT2-Yes, describe: Jardiance 10mg  Diuretic- Yes, describe: " "Lasix 40mg  BB-Yes, describe: 50mg BID  MRA-No  Please limit your fluid intake to less than 2 L/day and your salt intake to less than 2 g/day.  Maintain daily weights and if greater than 3 to 5 pound weight gain in a 24 to 48-hour. Please call the office.           Relevant Medications    metoprolol tartrate (Lopressor) 50 mg tablet    potassium chloride CR (Klor-Con M20) 20 mEq ER tablet    Other Relevant Orders    Referral to Home Care    Weight loss R63.4     Recently has extensive IV diureses while inpatient  Recommend daily weight with new CHF regimen         Relevant Orders    Referral to Home Care    Hallucinations R44.3     Continued through her hospital stay, they report it is not concerning but ever present at night   Discussed seroquel or trazodone will hold for the time being, manageable per caretaker   Melatonin continue   Hx of UTIs will check today   Declining psych or geriatrics          Relevant Orders    POCT UA (nonautomated) manually resulted    Urine Culture    Referral to Home Care     Other Visit Diagnoses         Codes    Hospitalization within last 30 days    -  Primary Z92.89    Relevant Orders    Comprehensive metabolic panel    Hypokalemia     E87.6    Relevant Medications    potassium chloride CR (Klor-Con M20) 20 mEq ER tablet    Other Relevant Orders    Comprehensive metabolic panel    Needs flu shot     Z23    Relevant Orders    Flu vaccine, trivalent, preservative free, HIGH-DOSE, age 65y+ (Fluzone) (Completed)    Urinary urgency     R39.15    Relevant Orders    Urine Culture    Pressure injury of deep tissue of buttock, unspecified laterality     L89.306    Relevant Medications    foam bandage (Mepilex Border Sacrum) 9.2 X 9.2 \" bandage    Other Relevant Orders    Referral to Home Care            "

## 2024-12-20 NOTE — HH CARE COORDINATION
Home Care received a Referral for Nursing. We have processed the referral for a Start of Care on 12/21/24-12/22/24.     If you have any questions or concerns, please feel free to contact us at [phone]. Follow the prompts, enter your five digit zip code, and you will be directed to your care team on [facility].

## 2024-12-20 NOTE — PATIENT INSTRUCTIONS
Will work on getting home health and wound care and supplies  1 week recheck for kidneys and potassium level, did send in supplement  Keep an eye on nighttime symptoms, will follow up in about 1 month

## 2024-12-20 NOTE — TELEPHONE ENCOUNTER
Maci Roberto would be the caregiver, and I placed an rx for mepilex bandages for dressing, do I need to fax that to you then instead of the pharmacy? Also she is in need for other issues besides the ulcer if you feel that is not enough of a cause for referral I think her other chronic conditions would meet home health needs.

## 2024-12-20 NOTE — TELEPHONE ENCOUNTER
Hello!  [facility] received the referral for pt Linda Roberto. In order to process, [facility] will need the order to be updated, as home care cannot go out with an order of treat an ulcer.  We would need a initial drsg order written. Also would need a teachable caregiver for the wound care as well as review of meds. Please identify who that would be.     Thank you!    Intake RN

## 2024-12-20 NOTE — ASSESSMENT & PLAN NOTE
Goal BP less than 130/80  Holding well off losartan, continue metoprolol 50mg BID  Work on maintaining a healthy weight, monitoring sodium intake, consistent activity and exercise  Avoid chronic use of NSAIDs  Do not use sudafed/pseudoephedrine for decongestant when ill

## 2024-12-20 NOTE — ASSESSMENT & PLAN NOTE
Continued through her hospital stay, they report it is not concerning but ever present at night   Discussed seroquel or trazodone will hold for the time being, manageable per caretaker   Melatonin continue   Hx of UTIs will check today   Declining psych or geriatrics

## 2024-12-20 NOTE — ASSESSMENT & PLAN NOTE
Echo last-2024 EF 70%, severely reduced R ventricular dysfunction    ACE/ARB:No  Arni-No  SGLT2-Yes, describe: Jardiance 10mg  Diuretic- Yes, describe: Lasix 40mg  BB-Yes, describe: 50mg BID  MRA-No  Please limit your fluid intake to less than 2 L/day and your salt intake to less than 2 g/day.  Maintain daily weights and if greater than 3 to 5 pound weight gain in a 24 to 48-hour. Please call the office.

## 2024-12-20 NOTE — ASSESSMENT & PLAN NOTE
GFR trend--> 40  BP-goal < 130/80   On ACE or ARB:  losartan stopped in the hospital due to BP and kidney function  DM II- goal of <7%   On SLGT2: YEs   Avoid nephrotoxic agents/Adjusting nephrotoxic agents--> now on lasix with heart condition, with memory issues working on keeping hydrated   Recommendation healthy physcial activity and heart healthy diet  Hydration with non sugar added or diuretic liquids

## 2024-12-22 ENCOUNTER — HOME CARE VISIT (OUTPATIENT)
Dept: HOME HEALTH SERVICES | Facility: HOME HEALTH | Age: 89
End: 2024-12-22
Payer: MEDICARE

## 2024-12-22 VITALS
DIASTOLIC BLOOD PRESSURE: 76 MMHG | HEIGHT: 61 IN | SYSTOLIC BLOOD PRESSURE: 96 MMHG | WEIGHT: 119 LBS | BODY MASS INDEX: 22.47 KG/M2 | TEMPERATURE: 97.2 F | HEART RATE: 72 BPM

## 2024-12-22 LAB — BACTERIA UR CULT: NORMAL

## 2024-12-22 PROCEDURE — 169592 NO-PAY CLAIM PROCEDURE

## 2024-12-22 PROCEDURE — G0299 HHS/HOSPICE OF RN EA 15 MIN: HCPCS | Mod: HHH

## 2024-12-22 ASSESSMENT — ENCOUNTER SYMPTOMS
LAST BOWEL MOVEMENT: 67196
LOWEST PAIN SEVERITY IN PAST 24 HOURS: 5/10
PERSON REPORTING PAIN: PATIENT
HIGHEST PAIN SEVERITY IN PAST 24 HOURS: 6/10
PAIN LOCATION: RIGHT HIP
PAIN: 1

## 2024-12-22 NOTE — HOME HEALTH
With this SN SOC visit, patient presented alert and oriented x 3 and was pleasant and cooperative. Patient sat at dining room table for assessment. The skill of a nurse is required for assessment of sx CKD worsening. The skill of therapy is appropriate after hospital stay greater than 3 days for strengthening, gait training, and safety teaching during ADL's. Discussed DC from nursing when goals are met.

## 2024-12-23 ENCOUNTER — PATIENT OUTREACH (OUTPATIENT)
Dept: PRIMARY CARE | Facility: CLINIC | Age: 89
End: 2024-12-23
Payer: MEDICARE

## 2024-12-23 ASSESSMENT — PAIN SCALES - PAIN ASSESSMENT IN ADVANCED DEMENTIA (PAINAD)
FACIALEXPRESSION: 0 - SMILING OR INEXPRESSIVE.
CONSOLABILITY: 0 - NO NEED TO CONSOLE.
FACIALEXPRESSION: 0
NEGVOCALIZATION: 0 - NONE.
BODYLANGUAGE: 1 - TENSE. DISTRESSED PACING. FIDGETING.
NEGVOCALIZATION: 0
BREATHING: 1
BODYLANGUAGE: 1
TOTALSCORE: 2
CONSOLABILITY: 0

## 2024-12-23 ASSESSMENT — ACTIVITIES OF DAILY LIVING (ADL)
DRESSING_LB_CURRENT_FUNCTION: MODERATE ASSIST
ORAL_CARE_CURRENT_FUNCTION: NEEDS ASSISTANCE
AMBULATION ASSISTANCE: STAND BY ASSIST
OASIS_M1830: 05
TOILETING: 1
FEEDING: SUPERVISION
LAUNDRY ASSESSED: 1
TRANSPORTATION ASSESSED: 1
FEEDING ASSESSED: 1
PREPARING MEALS: DEPENDENT
GROOMING ASSESSED: 1
ENTERING_EXITING_HOME: STAND BY ASSIST
CURRENT_FUNCTION: STAND BY ASSIST
LIGHT HOUSEKEEPING: DEPENDENT
USING THE TELPHONE: NEEDS ASSISTANCE
TOILETING: STAND BY ASSIST
GROOMING_CURRENT_FUNCTION: MINIMUM ASSIST
SHOPPING ASSESSED: 1
TELEPHONE USE ASSESSED: 1
PHYSICAL TRANSFERS ASSESSED: 1
SHOPPING: DEPENDENT
AMBULATION ASSISTANCE: 1
DRESSING_UB_CURRENT_FUNCTION: MINIMUM ASSIST
TRANSPORTATION: DEPENDENT
HOUSEKEEPING ASSESSED: 1
LAUNDRY: DEPENDENT
ORAL_CARE_ASSESSED: 1

## 2024-12-23 ASSESSMENT — ENCOUNTER SYMPTOMS
APPETITE LEVEL: GOOD
PAIN LOCATION - PAIN DURATION: CONSTANT
FATIGUES EASILY: 1
PAIN LOCATION - PAIN SEVERITY: 6/10
STOOL FREQUENCY: LESS THAN DAILY
SUBJECTIVE PAIN PROGRESSION: UNCHANGED
DEPRESSION: 0
PAIN LOCATION - PAIN QUALITY: SORE
CHANGE IN APPETITE: UNCHANGED
PAIN LOCATION - PAIN FREQUENCY: CONSTANT
PAIN LOCATION - EXACERBATING FACTORS: ACTIVITY
OCCASIONAL FEELINGS OF UNSTEADINESS: 1
FATIGUE: 1
LOSS OF SENSATION IN FEET: 0
FORGETFULNESS: 1
PAIN LOCATION - RELIEVING FACTORS: REST
CONSTIPATION: 1
DESCRIPTION OF MEMORY LOSS: SHORT TERM
MUSCLE WEAKNESS: 1
PAIN SEVERITY GOAL: 0/10

## 2024-12-23 NOTE — PROGRESS NOTES
Call regarding appt. with PCP on (20 Dec 24) after hospitalization.  At time of outreach call the patient feels as if their condition has improved since last visit.  Reviewed the PCP appointment with the pt and addressed any questions or concerns.

## 2024-12-30 ENCOUNTER — HOME CARE VISIT (OUTPATIENT)
Dept: HOME HEALTH SERVICES | Facility: HOME HEALTH | Age: 89
End: 2024-12-30
Payer: MEDICARE

## 2024-12-30 VITALS
SYSTOLIC BLOOD PRESSURE: 90 MMHG | TEMPERATURE: 97.9 F | DIASTOLIC BLOOD PRESSURE: 64 MMHG | HEART RATE: 87 BPM | OXYGEN SATURATION: 97 % | RESPIRATION RATE: 16 BRPM

## 2024-12-30 PROCEDURE — G0300 HHS/HOSPICE OF LPN EA 15 MIN: HCPCS | Mod: HHH

## 2024-12-30 ASSESSMENT — ENCOUNTER SYMPTOMS
APPETITE LEVEL: GOOD
LAST BOWEL MOVEMENT: 67204
CHANGE IN APPETITE: UNCHANGED
DENIES PAIN: 1

## 2025-01-01 ENCOUNTER — TELEPHONE (OUTPATIENT)
Dept: PRIMARY CARE | Facility: CLINIC | Age: OVER 89
End: 2025-01-01
Payer: MEDICARE

## 2025-01-01 DIAGNOSIS — I50.22 CHRONIC SYSTOLIC CONGESTIVE HEART FAILURE: ICD-10-CM

## 2025-01-01 DIAGNOSIS — R44.3 HALLUCINATIONS: ICD-10-CM

## 2025-01-01 DIAGNOSIS — N18.32 STAGE 3B CHRONIC KIDNEY DISEASE (MULTI): Primary | ICD-10-CM

## 2025-01-01 DIAGNOSIS — I48.91 ATRIAL FIBRILLATION, UNSPECIFIED TYPE (MULTI): ICD-10-CM

## 2025-01-01 DIAGNOSIS — R63.4 WEIGHT LOSS: ICD-10-CM

## 2025-01-02 ENCOUNTER — OFFICE VISIT (OUTPATIENT)
Dept: CARDIOLOGY | Facility: HOSPITAL | Age: OVER 89
End: 2025-01-02
Payer: MEDICARE

## 2025-01-02 VITALS
DIASTOLIC BLOOD PRESSURE: 72 MMHG | WEIGHT: 120 LBS | SYSTOLIC BLOOD PRESSURE: 110 MMHG | HEART RATE: 69 BPM | BODY MASS INDEX: 22.66 KG/M2 | HEIGHT: 61 IN | OXYGEN SATURATION: 92 %

## 2025-01-02 DIAGNOSIS — I10 BENIGN ESSENTIAL HYPERTENSION: Primary | Chronic | ICD-10-CM

## 2025-01-02 DIAGNOSIS — I50.32 CHRONIC HEART FAILURE WITH PRESERVED EJECTION FRACTION: ICD-10-CM

## 2025-01-02 PROCEDURE — 99213 OFFICE O/P EST LOW 20 MIN: CPT | Performed by: PHYSICIAN ASSISTANT

## 2025-01-02 PROCEDURE — 3074F SYST BP LT 130 MM HG: CPT | Performed by: PHYSICIAN ASSISTANT

## 2025-01-02 PROCEDURE — 3078F DIAST BP <80 MM HG: CPT | Performed by: PHYSICIAN ASSISTANT

## 2025-01-02 PROCEDURE — 1123F ACP DISCUSS/DSCN MKR DOCD: CPT | Performed by: PHYSICIAN ASSISTANT

## 2025-01-02 PROCEDURE — 1159F MED LIST DOCD IN RCRD: CPT | Performed by: PHYSICIAN ASSISTANT

## 2025-01-02 PROCEDURE — 1111F DSCHRG MED/CURRENT MED MERGE: CPT | Performed by: PHYSICIAN ASSISTANT

## 2025-01-02 PROCEDURE — 1157F ADVNC CARE PLAN IN RCRD: CPT | Performed by: PHYSICIAN ASSISTANT

## 2025-01-02 PROCEDURE — 1036F TOBACCO NON-USER: CPT | Performed by: PHYSICIAN ASSISTANT

## 2025-01-02 ASSESSMENT — ENCOUNTER SYMPTOMS
FEVER: 0
ORTHOPNEA: 0
ABDOMINAL PAIN: 0
SHORTNESS OF BREATH: 0
NAUSEA: 0
WEAKNESS: 0
VOMITING: 0
DYSURIA: 0
DIARRHEA: 0
PALPITATIONS: 0
WHEEZING: 0

## 2025-01-02 NOTE — PATIENT INSTRUCTIONS
Weigh yourself daily and record. If you gain 3lbs or more over your baseline weight (or if gain >3lbs in 1 day or >5lbs in 1 week), please call your heart failure/cardiology doctor.     Take an extra lasix tablet as needed for:  Gain of 3lbs or more over your baseline weight (or weight gain of 3lbs in 1 day or 5lbs in 1 week)  Worsening shortness of breath, especially when you lay down    Worsening leg swelling  - If no improvement with the extra dose, please call your heart failure/cardiology doctor.     Maintain a heart healthy diet with no more than 2G of sodium daily.   Restrict fluid intake to 2 Liters (or 8 cups) per day     I will look for the lab work Monday and if need be we can make further decisions on diuretic dosing.

## 2025-01-02 NOTE — PROGRESS NOTES
"Cardiology Follow Up  Chief Complaint:   Hospital Follow-up (Discuss about decreasing lasix)     History Of Present Illness:    Linda Roberto is a 89 year old female patient who presents today for annual followup of HTN and BLE edema. Her PMH is significant for HTN, hyperlipidemia, renal insufficiency, and mitral valve insufficiency. Over the past 1 yea, the patient states that she has been noting fluttering of her heart.  She does note some CP, SOB and significant LE edema,. They report that her water pill was discontinued by their PCP due to decreased kidney function.    1-2-25: This is a 89-year-old female patient known to Dr. Woods.  Presents with family for follow-up after recent hospitalization for decompensated heart failure.  Patient was requiring increased dose of diuretic with edema and shortness of breath.  She also had some renal dysfunction and therefore her Lasix dosing was decreased and she subsequently had decompensation.Patient has been sleeping in a recliner and continues to do so but her breathing is much more comfortable with no events of PND.  She does have chronic ankle edema which is unchanged at this time.  Her weight dropped with the hospitalization and has been stable right 119 to 120 pounds since being home.  She is due for labs on Monday.  She otherwise has not had any new cardiac complaints or concerns.  Family has noted lower blood pressures at times.     Last Recorded Vitals:  Vitals       Vitals:     01/02/25 1355   BP: 110/72   BP Location: Left arm   Patient Position: Sitting   BP Cuff Size: Adult   Pulse: 69   SpO2: 92%   Weight: 54.4 kg (120 lb)   Height: 1.549 m (5' 1\")            Past Medical History:  She has a past medical history of (HFpEF) heart failure with preserved ejection fraction, Atrial fibrillation (Multi), CKD (chronic kidney disease), HTN (hypertension), Mitral valve disease, and Venous insufficiency.     Past Surgical History:  She has a past surgical history that " includes Hysterectomy and Cataract extraction.      Social History:  She reports that she has never smoked. She has never used smokeless tobacco. She reports that she does not drink alcohol and does not use drugs.     Family History:  Family History          Family History   Problem Relation Name Age of Onset    Hypertension Mother                Allergies:  Lisinopril and Penicillins     Outpatient Medications:       Current Outpatient Medications   Medication Instructions    acetaminophen (TYLENOL) 650 mg, oral, Every 4 hours PRN    apixaban (ELIQUIS) 2.5 mg, oral, 2 times daily    Farxiga 10 mg, oral, Daily    furosemide (LASIX) 40 mg, oral, Daily    melatonin 10 mg, Daily    Mepilex Border Sacrum 1 Units, topical (top), Daily    metoprolol tartrate (LOPRESSOR) 50 mg, oral, 2 times daily    potassium chloride CR (Klor-Con M20) 20 mEq ER tablet 20 mEq, oral, Daily, Do not crush or chew.      Review of Systems   Constitutional: Negative for fever and malaise/fatigue.        Stable weight   Cardiovascular:  Positive for leg swelling. Negative for chest pain, orthopnea and palpitations.        Chronic edema at the ankles--edema for her right now is very good.   Respiratory:  Negative for shortness of breath and wheezing.    Skin:  Negative for itching and rash.   Gastrointestinal:  Negative for abdominal pain, diarrhea, nausea and vomiting.   Genitourinary:  Negative for dysuria.   Neurological:  Negative for weakness.      Physical Exam  Constitutional:       General: She is not in acute distress.     Appearance: Normal appearance.   HENT:      Mouth/Throat:      Mouth: Mucous membranes are moist.   Neck:      Comments: No JVD or HJR  Cardiovascular:      Rate and Rhythm: Normal rate. Rhythm irregular.      Heart sounds: No murmur heard.     Comments: Patient has chronic atrial fibrillation  Pulmonary:      Effort: Pulmonary effort is normal.      Breath sounds: Normal breath sounds. No wheezing or rales.       Comments: No dullness to percussion to indicate recurrent right-sided pleural effusion  Abdominal:      General: Bowel sounds are normal.      Palpations: Abdomen is soft.      Tenderness: There is no abdominal tenderness.   Musculoskeletal:         General: Swelling present.      Comments: Trace ankle edema   Skin:     General: Skin is warm and dry.   Neurological:      Mental Status: She is alert and oriented to person, place, and time.   Psychiatric:         Behavior: Behavior is cooperative.            Last Labs:  CBC -        Lab Results   Component Value Date     WBC 10.1 12/09/2024     HGB 11.1 (L) 12/09/2024     HCT 33.8 (L) 12/09/2024     MCV 92 12/09/2024      (L) 12/09/2024         CMP -        Lab Results   Component Value Date     CALCIUM 9.0 12/12/2024     PROT 7.5 12/07/2024     ALBUMIN 4.1 12/07/2024     AST 19 12/07/2024     ALT 11 12/07/2024     ALKPHOS 51 12/07/2024     BILITOT 1.2 12/07/2024         LIPID PANEL -         Lab Results   Component Value Date     CHOL 182 04/30/2024     TRIG 82 04/30/2024     HDL 57.6 04/30/2024     CHHDL 3.2 04/30/2024     LDLF 108 (H) 05/01/2023     VLDL 16 04/30/2024     NHDL 124 04/30/2024         RENAL FUNCTION PANEL -         Lab Results   Component Value Date     GLUCOSE 88 12/12/2024      12/12/2024     K 4.7 12/12/2024     CL 98 12/12/2024     CO2 32 12/12/2024     ANIONGAP 12 12/12/2024     BUN 24 (H) 12/12/2024     CREATININE 1.27 (H) 12/12/2024     CALCIUM 9.0 12/12/2024     ALBUMIN 4.1 12/07/2024               Lab Results   Component Value Date      (H) 12/07/2024     HGBA1C 5.8 08/17/2020         Last Cardiology Tests:     Echo:  Transthoracic Echo (TTE) Complete 12/07/2024--CONCLUSIONS:   1. Left ventricular ejection fraction is normal, calculated by Alfred's biplane at 70%.   2. Left ventricular diastolic filling was indeterminate.   3. There is severely reduced right ventricular systolic function.   4. The left atrium is  moderately dilated.   5. Mild to moderate tricuspid regurgitation.     Ejection Fractions:        EF   Date/Time Value Ref Range Status   12/07/2024 11:22 AM 70 %              Lab review: I have personally reviewed the laboratory result(s).        Assessment/Plan  Problem List Items Addressed This Visit               ICD-10-CM             Cardiac and Vasculature     Benign essential hypertension - Primary (Chronic) I10     (HFpEF) heart failure with preserved ejection fraction I50.30   1) Recent admit/shortness of breath--patient started having difficulties after her diuretic dosing was reduced due to CKD.  During her hospitalization was started on Farxiga and her Lasix dosing was increased.  Since being home breathing is doing better and her weight has remained stable since her time of discharge.  She is cognizant of the need for salt and fluid restriction and is due for upcoming labs on Monday.  If the patient is having low blood pressures and has worsening renal dysfunction may need to cut back on her diuretics.  Her echocardiogram again showed preserved LV systolic function.  During the admission she was also noted to have pneumonia and was treated with antibiotics.     2) Permanent Atrial fib--patient has no awareness of her chronic atrial fibrillation.  Ventricular rates well-controlled she is anticoagulated without any bleeding complications.     3) Acute on  Chronic right heart failure--blood pressures again are good and she appears well compensated.  CT scan of her chest did show a small to moderate right pleural effusion which is not noted on her physical examination today.  Continue current dose of diuretic but again if low blood pressures or worsening renal dysfunction may need to cut back on the diuretic.  She has not required oxygen at home.     4) CKD--has not required follow-up with nephrology and is due for labs on Monday.  But again if low blood pressures and worsening creatinine may need to reduce  the Lasix to 20 mg daily.  She will continue with a salt and fluid restricted heart healthy diet.     Overall patient doing well and appears much better compensated at this time.  Weights are stable and her breathing remains stable since her discharge.  She is scheduled for follow-up with Dr. Woods on 31 January but the family needs to reschedule that due to another commitment.  Should the be any change or instructed to contact the office otherwise we will get back to the patient regarding upcoming labs on Monday.        Marbin Booker PA-C  1/2/2025  2:56 PM

## 2025-01-02 NOTE — PROGRESS NOTES
"Cardiology Follow Up  Chief Complaint:   Hospital Follow-up (Discuss about decreasing lasix)     History Of Present Illness:    Linda Roberto is a 89 year old female patient who presents today for annual followup of HTN and BLE edema. Her PMH is significant for HTN, hyperlipidemia, renal insufficiency, and mitral valve insufficiency. Over the past 1 yea, the patient states that she has been noting fluttering of her heart.  She does note some CP, SOB and significant LE edema,. They report that her water pill was discontinued by their PCP due to decreased kidney function.    1-2-25: This is a 89-year-old female patient known to Dr. Woods.  Presents with family for follow-up after recent hospitalization for decompensated heart failure.  Patient was requiring increased dose of diuretic with edema and shortness of breath.  She also had some renal dysfunction and therefore her Lasix dosing was decreased and she subsequently had decompensation.Patient has been sleeping in a recliner and continues to do so but her breathing is much more comfortable with no events of PND.  She does have chronic ankle edema which is unchanged at this time.  Her weight dropped with the hospitalization and has been stable right 119 to 120 pounds since being home.  She is due for labs on Monday.  She otherwise has not had any new cardiac complaints or concerns.  Family has noted lower blood pressures at times.     Last Recorded Vitals:  Vitals:    01/02/25 1355   BP: 110/72   BP Location: Left arm   Patient Position: Sitting   BP Cuff Size: Adult   Pulse: 69   SpO2: 92%   Weight: 54.4 kg (120 lb)   Height: 1.549 m (5' 1\")       Past Medical History:  She has a past medical history of (HFpEF) heart failure with preserved ejection fraction, Atrial fibrillation (Multi), CKD (chronic kidney disease), HTN (hypertension), Mitral valve disease, and Venous insufficiency.    Past Surgical History:  She has a past surgical history that includes Hysterectomy " and Cataract extraction.      Social History:  She reports that she has never smoked. She has never used smokeless tobacco. She reports that she does not drink alcohol and does not use drugs.    Family History:  Family History   Problem Relation Name Age of Onset    Hypertension Mother          Allergies:  Lisinopril and Penicillins    Outpatient Medications:  Current Outpatient Medications   Medication Instructions    acetaminophen (TYLENOL) 650 mg, oral, Every 4 hours PRN    apixaban (ELIQUIS) 2.5 mg, oral, 2 times daily    Farxiga 10 mg, oral, Daily    furosemide (LASIX) 40 mg, oral, Daily    melatonin 10 mg, Daily    Mepilex Border Sacrum 1 Units, topical (top), Daily    metoprolol tartrate (LOPRESSOR) 50 mg, oral, 2 times daily    potassium chloride CR (Klor-Con M20) 20 mEq ER tablet 20 mEq, oral, Daily, Do not crush or chew.     Review of Systems   Constitutional: Negative for fever and malaise/fatigue.        Stable weight   Cardiovascular:  Positive for leg swelling. Negative for chest pain, orthopnea and palpitations.        Chronic edema at the ankles--edema for her right now is very good.   Respiratory:  Negative for shortness of breath and wheezing.    Skin:  Negative for itching and rash.   Gastrointestinal:  Negative for abdominal pain, diarrhea, nausea and vomiting.   Genitourinary:  Negative for dysuria.   Neurological:  Negative for weakness.      Physical Exam  Constitutional:       General: She is not in acute distress.     Appearance: Normal appearance.   HENT:      Mouth/Throat:      Mouth: Mucous membranes are moist.   Neck:      Comments: No JVD or HJR  Cardiovascular:      Rate and Rhythm: Normal rate. Rhythm irregular.      Heart sounds: No murmur heard.     Comments: Patient has chronic atrial fibrillation  Pulmonary:      Effort: Pulmonary effort is normal.      Breath sounds: Normal breath sounds. No wheezing or rales.      Comments: No dullness to percussion to indicate recurrent  right-sided pleural effusion  Abdominal:      General: Bowel sounds are normal.      Palpations: Abdomen is soft.      Tenderness: There is no abdominal tenderness.   Musculoskeletal:         General: Swelling present.      Comments: Trace ankle edema   Skin:     General: Skin is warm and dry.   Neurological:      Mental Status: She is alert and oriented to person, place, and time.   Psychiatric:         Behavior: Behavior is cooperative.           Last Labs:  CBC -  Lab Results   Component Value Date    WBC 10.1 12/09/2024    HGB 11.1 (L) 12/09/2024    HCT 33.8 (L) 12/09/2024    MCV 92 12/09/2024     (L) 12/09/2024       CMP -  Lab Results   Component Value Date    CALCIUM 9.0 12/12/2024    PROT 7.5 12/07/2024    ALBUMIN 4.1 12/07/2024    AST 19 12/07/2024    ALT 11 12/07/2024    ALKPHOS 51 12/07/2024    BILITOT 1.2 12/07/2024       LIPID PANEL -   Lab Results   Component Value Date    CHOL 182 04/30/2024    TRIG 82 04/30/2024    HDL 57.6 04/30/2024    CHHDL 3.2 04/30/2024    LDLF 108 (H) 05/01/2023    VLDL 16 04/30/2024    NHDL 124 04/30/2024       RENAL FUNCTION PANEL -   Lab Results   Component Value Date    GLUCOSE 88 12/12/2024     12/12/2024    K 4.7 12/12/2024    CL 98 12/12/2024    CO2 32 12/12/2024    ANIONGAP 12 12/12/2024    BUN 24 (H) 12/12/2024    CREATININE 1.27 (H) 12/12/2024    CALCIUM 9.0 12/12/2024    ALBUMIN 4.1 12/07/2024        Lab Results   Component Value Date     (H) 12/07/2024    HGBA1C 5.8 08/17/2020       Last Cardiology Tests:    Echo:  Transthoracic Echo (TTE) Complete 12/07/2024--CONCLUSIONS:   1. Left ventricular ejection fraction is normal, calculated by Alfred's biplane at 70%.   2. Left ventricular diastolic filling was indeterminate.   3. There is severely reduced right ventricular systolic function.   4. The left atrium is moderately dilated.   5. Mild to moderate tricuspid regurgitation.    Ejection Fractions:  EF   Date/Time Value Ref Range Status    12/07/2024 11:22 AM 70 %          Lab review: I have personally reviewed the laboratory result(s).    Assessment/Plan   Problem List Items Addressed This Visit             ICD-10-CM       Cardiac and Vasculature    Benign essential hypertension - Primary (Chronic) I10    (HFpEF) heart failure with preserved ejection fraction I50.30   1) Recent admit/shortness of breath--patient started having difficulties after her diuretic dosing was reduced due to CKD.  During her hospitalization was started on Farxiga and her Lasix dosing was increased.  Since being home breathing is doing better and her weight has remained stable since her time of discharge.  She is cognizant of the need for salt and fluid restriction and is due for upcoming labs on Monday.  If the patient is having low blood pressures and has worsening renal dysfunction may need to cut back on her diuretics.  Her echocardiogram again showed preserved LV systolic function.  During the admission she was also noted to have pneumonia and was treated with antibiotics.     2) Permanent Atrial fib--patient has no awareness of her chronic atrial fibrillation.  Ventricular rates well-controlled she is anticoagulated without any bleeding complications.     3) Acute on  Chronic right heart failure--blood pressures again are good and she appears well compensated.  CT scan of her chest did show a small to moderate right pleural effusion which is not noted on her physical examination today.  Continue current dose of diuretic but again if low blood pressures or worsening renal dysfunction may need to cut back on the diuretic.  She has not required oxygen at home.    4) CKD--has not required follow-up with nephrology and is due for labs on Monday.  But again if low blood pressures and worsening creatinine may need to reduce the Lasix to 20 mg daily.  She will continue with a salt and fluid restricted heart healthy diet.    Overall patient doing well and appears much better  compensated at this time.  Weights are stable and her breathing remains stable since her discharge.  She is scheduled for follow-up with Dr. Woods on 31 January but the family needs to reschedule that due to another commitment.  Should the be any change or instructed to contact the office otherwise we will get back to the patient regarding upcoming labs on Monday.      Marbin Booker PA-C  1/2/2025  2:56 PM

## 2025-01-03 ENCOUNTER — TELEPHONE (OUTPATIENT)
Dept: PRIMARY CARE | Facility: CLINIC | Age: OVER 89
End: 2025-01-03

## 2025-01-03 ENCOUNTER — LAB (OUTPATIENT)
Dept: LAB | Facility: LAB | Age: OVER 89
End: 2025-01-03
Payer: MEDICARE

## 2025-01-03 ENCOUNTER — TELEPHONE (OUTPATIENT)
Dept: CARDIOLOGY | Facility: HOSPITAL | Age: OVER 89
End: 2025-01-03

## 2025-01-03 DIAGNOSIS — N18.32 STAGE 3B CHRONIC KIDNEY DISEASE (MULTI): Chronic | ICD-10-CM

## 2025-01-03 DIAGNOSIS — Z92.89 HOSPITALIZATION WITHIN LAST 30 DAYS: ICD-10-CM

## 2025-01-03 DIAGNOSIS — E87.6 HYPOKALEMIA: ICD-10-CM

## 2025-01-03 DIAGNOSIS — R44.3 HALLUCINATIONS: ICD-10-CM

## 2025-01-03 LAB
ALBUMIN SERPL BCP-MCNC: 3.7 G/DL (ref 3.4–5)
ALP SERPL-CCNC: 37 U/L (ref 33–136)
ALT SERPL W P-5'-P-CCNC: 9 U/L (ref 7–45)
ANION GAP SERPL CALC-SCNC: 15 MMOL/L (ref 10–20)
APPEARANCE UR: CLEAR
AST SERPL W P-5'-P-CCNC: 16 U/L (ref 9–39)
BACTERIA #/AREA URNS AUTO: ABNORMAL /HPF
BASOPHILS # BLD AUTO: 0.03 X10*3/UL (ref 0–0.1)
BASOPHILS NFR BLD AUTO: 0.6 %
BILIRUB SERPL-MCNC: 0.7 MG/DL (ref 0–1.2)
BILIRUB UR STRIP.AUTO-MCNC: NEGATIVE MG/DL
BUN SERPL-MCNC: 24 MG/DL (ref 6–23)
CALCIUM SERPL-MCNC: 9.1 MG/DL (ref 8.6–10.3)
CHLORIDE SERPL-SCNC: 97 MMOL/L (ref 98–107)
CO2 SERPL-SCNC: 33 MMOL/L (ref 21–32)
COLOR UR: YELLOW
CREAT SERPL-MCNC: 1.7 MG/DL (ref 0.5–1.05)
EGFRCR SERPLBLD CKD-EPI 2021: 28 ML/MIN/1.73M*2
EOSINOPHIL # BLD AUTO: 0.14 X10*3/UL (ref 0–0.4)
EOSINOPHIL NFR BLD AUTO: 2.9 %
ERYTHROCYTE [DISTWIDTH] IN BLOOD BY AUTOMATED COUNT: 15.2 % (ref 11.5–14.5)
GLUCOSE SERPL-MCNC: 81 MG/DL (ref 74–99)
GLUCOSE UR STRIP.AUTO-MCNC: ABNORMAL MG/DL
HCT VFR BLD AUTO: 37.3 % (ref 36–46)
HGB BLD-MCNC: 11.8 G/DL (ref 12–16)
HYALINE CASTS #/AREA URNS AUTO: ABNORMAL /LPF
IMM GRANULOCYTES # BLD AUTO: 0.01 X10*3/UL (ref 0–0.5)
IMM GRANULOCYTES NFR BLD AUTO: 0.2 % (ref 0–0.9)
KETONES UR STRIP.AUTO-MCNC: NEGATIVE MG/DL
LEUKOCYTE ESTERASE UR QL STRIP.AUTO: NEGATIVE
LYMPHOCYTES # BLD AUTO: 1.45 X10*3/UL (ref 0.8–3)
LYMPHOCYTES NFR BLD AUTO: 29.6 %
MCH RBC QN AUTO: 31.6 PG (ref 26–34)
MCHC RBC AUTO-ENTMCNC: 31.6 G/DL (ref 32–36)
MCV RBC AUTO: 100 FL (ref 80–100)
MONOCYTES # BLD AUTO: 0.44 X10*3/UL (ref 0.05–0.8)
MONOCYTES NFR BLD AUTO: 9 %
MUCOUS THREADS #/AREA URNS AUTO: ABNORMAL /LPF
NEUTROPHILS # BLD AUTO: 2.83 X10*3/UL (ref 1.6–5.5)
NEUTROPHILS NFR BLD AUTO: 57.7 %
NITRITE UR QL STRIP.AUTO: NEGATIVE
NRBC BLD-RTO: 0 /100 WBCS (ref 0–0)
PH UR STRIP.AUTO: 6 [PH]
PLATELET # BLD AUTO: 165 X10*3/UL (ref 150–450)
POTASSIUM SERPL-SCNC: 4.3 MMOL/L (ref 3.5–5.3)
PROT SERPL-MCNC: 6.7 G/DL (ref 6.4–8.2)
PROT UR STRIP.AUTO-MCNC: ABNORMAL MG/DL
RBC # BLD AUTO: 3.73 X10*6/UL (ref 4–5.2)
RBC # UR STRIP.AUTO: NEGATIVE /UL
RBC #/AREA URNS AUTO: ABNORMAL /HPF
SODIUM SERPL-SCNC: 141 MMOL/L (ref 136–145)
SP GR UR STRIP.AUTO: 1.02
SQUAMOUS #/AREA URNS AUTO: ABNORMAL /HPF
UROBILINOGEN UR STRIP.AUTO-MCNC: NORMAL MG/DL
WBC # BLD AUTO: 4.9 X10*3/UL (ref 4.4–11.3)
WBC #/AREA URNS AUTO: ABNORMAL /HPF

## 2025-01-03 PROCEDURE — 85025 COMPLETE CBC W/AUTO DIFF WBC: CPT

## 2025-01-03 PROCEDURE — 81001 URINALYSIS AUTO W/SCOPE: CPT

## 2025-01-03 PROCEDURE — 80053 COMPREHEN METABOLIC PANEL: CPT

## 2025-01-03 NOTE — TELEPHONE ENCOUNTER
BP was 95/49, 91/41, 94/55, 93/57 and today it was 101/58 after having labs drawn.  Cardio said he would lower her lasix if the kidney function declined.

## 2025-01-03 NOTE — TELEPHONE ENCOUNTER
Maci said she went to cardiology yesterday and Jhony said it could have something to do with her lasix, but was waiting on labs to adjust meds.  Asked if there was anything to note on her labs? Her bp has been low, O2 has been anywhere from .

## 2025-01-03 NOTE — TELEPHONE ENCOUNTER
Lasix is also likely what is causing lower blood pressures. I would recommend taking daily morning BP and if its lower than 90/60 taking only half her dose. She should also alert cardiology since they are managing this medication, I do not want to step on any toes.

## 2025-01-03 NOTE — TELEPHONE ENCOUNTER
How low is her BP going?   Her kidney function is showing continued decline. It is hard with something like CHF, you need things like lasix to ease strain on the heart, but this is often at the cost of the kidney. Im glad cardiology is following labs, I would not recommend increasing this med right now for fear of worsening kidney status. I really think she needs to see nephrology for this given we are progressed into stage 4 disease. My referral is still good for this.

## 2025-01-03 NOTE — TELEPHONE ENCOUNTER
----- Message from Laurie Avina sent at 1/3/2025  8:46 AM EST -----  Urinalysis is showing no signs of UTI, but microscopic shows some bacteria, this could be 2/2 contamination. Is she having symptoms like urinary frequency, confusion?

## 2025-01-06 ENCOUNTER — HOME CARE VISIT (OUTPATIENT)
Dept: HOME HEALTH SERVICES | Facility: HOME HEALTH | Age: OVER 89
End: 2025-01-06
Payer: MEDICARE

## 2025-01-06 VITALS
HEART RATE: 96 BPM | DIASTOLIC BLOOD PRESSURE: 74 MMHG | OXYGEN SATURATION: 90 % | TEMPERATURE: 97.6 F | SYSTOLIC BLOOD PRESSURE: 124 MMHG | RESPIRATION RATE: 20 BRPM

## 2025-01-06 PROCEDURE — G0299 HHS/HOSPICE OF RN EA 15 MIN: HCPCS | Mod: HHH

## 2025-01-06 SDOH — ECONOMIC STABILITY: GENERAL

## 2025-01-06 ASSESSMENT — PAIN SCALES - PAIN ASSESSMENT IN ADVANCED DEMENTIA (PAINAD)
CONSOLABILITY: 0
TOTALSCORE: 0
NEGVOCALIZATION: 0
FACIALEXPRESSION: 0 - SMILING OR INEXPRESSIVE.
BREATHING: 0
BODYLANGUAGE: 0 - RELAXED.
NEGVOCALIZATION: 0 - NONE.
FACIALEXPRESSION: 0
CONSOLABILITY: 0 - NO NEED TO CONSOLE.
BODYLANGUAGE: 0

## 2025-01-06 ASSESSMENT — ENCOUNTER SYMPTOMS
DENIES PAIN: 1
APPETITE LEVEL: GOOD
MUSCLE WEAKNESS: 1
FATIGUE: 1
LAST BOWEL MOVEMENT: 67211
CHANGE IN APPETITE: UNCHANGED
PERSON REPORTING PAIN: PATIENT
DRY SKIN: 1

## 2025-01-06 ASSESSMENT — ACTIVITIES OF DAILY LIVING (ADL)
MONEY MANAGEMENT (EXPENSES/BILLS): NEEDS ASSISTANCE
AMBULATION ASSISTANCE: STAND BY ASSIST
CURRENT_FUNCTION: STAND BY ASSIST

## 2025-01-06 NOTE — HOME HEALTH
With this SN visit, patient presented alert and oriented x 3 and was pleasant and cooperative. Patient sat at dining room table for assessment. The skill of the nurse is required due to Chronic Renal Failure and CHF plus for hx HTN. Discussed DC from nursing when goals are met. Patient verbalized understanding.

## 2025-01-06 NOTE — TELEPHONE ENCOUNTER
Rn called pt at this time with results and plan. No answer at this time, RN left message. Orders were placed.

## 2025-01-06 NOTE — TELEPHONE ENCOUNTER
Rn called pt at this time with Dr. Woods's directives. Per Dr. Woods- No lasix - No metoprolol for 3 days and then restart 25 BID. Repeat CBC, BMP in 1 week. Orders were sent with refills and daughter Maci verbalized understanding.

## 2025-01-07 ENCOUNTER — TELEPHONE (OUTPATIENT)
Dept: CARDIOLOGY | Facility: HOSPITAL | Age: OVER 89
End: 2025-01-07
Payer: MEDICARE

## 2025-01-07 NOTE — TELEPHONE ENCOUNTER
1/7 - Pt daughter Maci called to speak to care team regarding cost of new prescription Farxiga. Pharmacy stating OOP cost is ~ $700 and she cannot afford to pay this. Routing to care team for further assistance.

## 2025-01-08 NOTE — TELEPHONE ENCOUNTER
RN called pt at this time regarding the cost of Farixga for the patient, per Dr. Woods the medication can be discontinued. No answer at this time, RN left message for patient to call back.     Pts daughter called back in, they are aware that Dr. Woods discontinued her Fargixga due to costs. Pts daughter verbalized understanding.

## 2025-01-09 ENCOUNTER — PATIENT OUTREACH (OUTPATIENT)
Dept: PRIMARY CARE | Facility: CLINIC | Age: OVER 89
End: 2025-01-09
Payer: MEDICARE

## 2025-01-17 ENCOUNTER — APPOINTMENT (OUTPATIENT)
Dept: HOME HEALTH SERVICES | Facility: HOME HEALTH | Age: OVER 89
End: 2025-01-17
Payer: MEDICARE

## 2025-01-18 ENCOUNTER — HOME CARE VISIT (OUTPATIENT)
Dept: HOME HEALTH SERVICES | Facility: HOME HEALTH | Age: OVER 89
End: 2025-01-18
Payer: MEDICARE

## 2025-01-18 ENCOUNTER — LAB (OUTPATIENT)
Dept: LAB | Facility: LAB | Age: OVER 89
End: 2025-01-18
Payer: MEDICARE

## 2025-01-18 DIAGNOSIS — N18.32 STAGE 3B CHRONIC KIDNEY DISEASE (MULTI): ICD-10-CM

## 2025-01-18 DIAGNOSIS — I50.32 CHRONIC HEART FAILURE WITH PRESERVED EJECTION FRACTION: ICD-10-CM

## 2025-01-18 DIAGNOSIS — I50.9 ACUTE ON CHRONIC CONGESTIVE HEART FAILURE, UNSPECIFIED HEART FAILURE TYPE: ICD-10-CM

## 2025-01-18 LAB
ANION GAP SERPL CALC-SCNC: 11 MMOL/L (ref 10–20)
BNP SERPL-MCNC: 458 PG/ML (ref 0–99)
BUN SERPL-MCNC: 18 MG/DL (ref 6–23)
CALCIUM SERPL-MCNC: 9 MG/DL (ref 8.6–10.3)
CHLORIDE SERPL-SCNC: 103 MMOL/L (ref 98–107)
CO2 SERPL-SCNC: 29 MMOL/L (ref 21–32)
CREAT SERPL-MCNC: 1.06 MG/DL (ref 0.5–1.05)
EGFRCR SERPLBLD CKD-EPI 2021: 49 ML/MIN/1.73M*2
ERYTHROCYTE [DISTWIDTH] IN BLOOD BY AUTOMATED COUNT: 14.9 % (ref 11.5–14.5)
GLUCOSE SERPL-MCNC: 88 MG/DL (ref 74–99)
HCT VFR BLD AUTO: 37.4 % (ref 36–46)
HGB BLD-MCNC: 11.5 G/DL (ref 12–16)
MCH RBC QN AUTO: 31 PG (ref 26–34)
MCHC RBC AUTO-ENTMCNC: 30.7 G/DL (ref 32–36)
MCV RBC AUTO: 101 FL (ref 80–100)
NRBC BLD-RTO: 0 /100 WBCS (ref 0–0)
PLATELET # BLD AUTO: 150 X10*3/UL (ref 150–450)
POTASSIUM SERPL-SCNC: 4.3 MMOL/L (ref 3.5–5.3)
RBC # BLD AUTO: 3.71 X10*6/UL (ref 4–5.2)
SODIUM SERPL-SCNC: 139 MMOL/L (ref 136–145)
WBC # BLD AUTO: 5.5 X10*3/UL (ref 4.4–11.3)

## 2025-01-18 PROCEDURE — 83880 ASSAY OF NATRIURETIC PEPTIDE: CPT

## 2025-01-18 PROCEDURE — 80048 BASIC METABOLIC PNL TOTAL CA: CPT

## 2025-01-18 PROCEDURE — 85027 COMPLETE CBC AUTOMATED: CPT

## 2025-01-23 ENCOUNTER — APPOINTMENT (OUTPATIENT)
Dept: PRIMARY CARE | Facility: CLINIC | Age: OVER 89
End: 2025-01-23
Payer: MEDICARE

## 2025-01-23 VITALS
BODY MASS INDEX: 23.98 KG/M2 | HEART RATE: 81 BPM | WEIGHT: 127 LBS | DIASTOLIC BLOOD PRESSURE: 79 MMHG | SYSTOLIC BLOOD PRESSURE: 142 MMHG | OXYGEN SATURATION: 92 % | HEIGHT: 61 IN

## 2025-01-23 DIAGNOSIS — I50.9 ACUTE ON CHRONIC CONGESTIVE HEART FAILURE, UNSPECIFIED HEART FAILURE TYPE: ICD-10-CM

## 2025-01-23 DIAGNOSIS — R79.9 ABNORMAL FINDING OF BLOOD CHEMISTRY, UNSPECIFIED: ICD-10-CM

## 2025-01-23 DIAGNOSIS — I50.22 CHRONIC SYSTOLIC CONGESTIVE HEART FAILURE: Primary | ICD-10-CM

## 2025-01-23 DIAGNOSIS — N18.32 STAGE 3B CHRONIC KIDNEY DISEASE (MULTI): Chronic | ICD-10-CM

## 2025-01-23 DIAGNOSIS — I48.91 ATRIAL FIBRILLATION, UNSPECIFIED TYPE (MULTI): ICD-10-CM

## 2025-01-23 PROBLEM — J96.01 ACUTE RESPIRATORY FAILURE WITH HYPOXIA (MULTI): Status: RESOLVED | Noted: 2024-12-07 | Resolved: 2025-01-23

## 2025-01-23 PROCEDURE — 1123F ACP DISCUSS/DSCN MKR DOCD: CPT | Performed by: STUDENT IN AN ORGANIZED HEALTH CARE EDUCATION/TRAINING PROGRAM

## 2025-01-23 PROCEDURE — 1159F MED LIST DOCD IN RCRD: CPT | Performed by: STUDENT IN AN ORGANIZED HEALTH CARE EDUCATION/TRAINING PROGRAM

## 2025-01-23 PROCEDURE — 99214 OFFICE O/P EST MOD 30 MIN: CPT | Performed by: STUDENT IN AN ORGANIZED HEALTH CARE EDUCATION/TRAINING PROGRAM

## 2025-01-23 PROCEDURE — 1036F TOBACCO NON-USER: CPT | Performed by: STUDENT IN AN ORGANIZED HEALTH CARE EDUCATION/TRAINING PROGRAM

## 2025-01-23 PROCEDURE — 3078F DIAST BP <80 MM HG: CPT | Performed by: STUDENT IN AN ORGANIZED HEALTH CARE EDUCATION/TRAINING PROGRAM

## 2025-01-23 PROCEDURE — 3077F SYST BP >= 140 MM HG: CPT | Performed by: STUDENT IN AN ORGANIZED HEALTH CARE EDUCATION/TRAINING PROGRAM

## 2025-01-23 PROCEDURE — 1160F RVW MEDS BY RX/DR IN RCRD: CPT | Performed by: STUDENT IN AN ORGANIZED HEALTH CARE EDUCATION/TRAINING PROGRAM

## 2025-01-23 PROCEDURE — 1157F ADVNC CARE PLAN IN RCRD: CPT | Performed by: STUDENT IN AN ORGANIZED HEALTH CARE EDUCATION/TRAINING PROGRAM

## 2025-01-23 RX ORDER — FUROSEMIDE 40 MG/1
20 TABLET ORAL DAILY
Start: 2025-01-23 | End: 2025-02-22

## 2025-01-23 ASSESSMENT — ENCOUNTER SYMPTOMS
ARTHRALGIAS: 1
DEPRESSION: 0
SHORTNESS OF BREATH: 0
OCCASIONAL FEELINGS OF UNSTEADINESS: 0
LOSS OF SENSATION IN FEET: 0
COUGH: 0
PALPITATIONS: 0
FATIGUE: 0
FACIAL ASYMMETRY: 0
HEADACHES: 0
FEVER: 0
LIGHT-HEADEDNESS: 0

## 2025-01-23 ASSESSMENT — COLUMBIA-SUICIDE SEVERITY RATING SCALE - C-SSRS
2. HAVE YOU ACTUALLY HAD ANY THOUGHTS OF KILLING YOURSELF?: NO
1. IN THE PAST MONTH, HAVE YOU WISHED YOU WERE DEAD OR WISHED YOU COULD GO TO SLEEP AND NOT WAKE UP?: NO
6. HAVE YOU EVER DONE ANYTHING, STARTED TO DO ANYTHING, OR PREPARED TO DO ANYTHING TO END YOUR LIFE?: NO

## 2025-01-23 NOTE — ASSESSMENT & PLAN NOTE
Anticoagulation: Yes, describe: eliquis   Any concerns for bleeding: no  Rate Control: Yes, describe: metoprolol  Rhythm Control: No

## 2025-01-23 NOTE — PROGRESS NOTES
"Patient Name:  Linda Roberto  MRN:  [id number]  :  1932    Subjective   Patient ID: Linda Roberto is a 89 y.o. female who presents for Follow-up (Pt here for follow up, Peggy had labs done and no one ever called, would like to go over with you if possible?  He took her off of the lasix and her bp is much better.).    HPI     89 yo female presents for follow up  We have been trying to balance her CKD and CHF  She has declined to see nephrology    Unable to afford farxiga- per cardiology notes discontinued     Cardiology approved cut back lasix to 20mg     Labs were ordered by cardiology, the patient would like to review today   Hemoglobin  12.0 - 16.0 g/dL 11.5 Low  11.8 Low  11.1 Low      eGFR  >60 mL/min/1.73m*2 49 Low  28 Low  CMu 40 Low       BNP  0 - 99 pg/mL 458 High  785 High      Review of Systems   Constitutional:  Negative for fatigue and fever.   Respiratory:  Negative for cough and shortness of breath.    Cardiovascular:  Negative for chest pain, palpitations and leg swelling.   Musculoskeletal:  Positive for arthralgias.   Allergic/Immunologic: Negative for immunocompromised state.   Neurological:  Negative for facial asymmetry, light-headedness and headaches.     Objective   /79 (BP Location: Left arm, Patient Position: Sitting)   Pulse 81   Ht 1.549 m (5' 1\")   Wt 57.6 kg (127 lb)   SpO2 92%   BMI 24.00 kg/m²     Physical Exam  Constitutional:       Appearance: Normal appearance.   HENT:      Head: Normocephalic and atraumatic.   Cardiovascular:      Rate and Rhythm: Normal rate and regular rhythm.   Neurological:      General: No focal deficit present.      Mental Status: She is alert and oriented to person, place, and time.   Psychiatric:         Mood and Affect: Mood normal.         Behavior: Behavior normal.     Assessment/Plan   Problem List Items Addressed This Visit             ICD-10-CM    Stage 3b chronic kidney disease (Multi) (Chronic) N18.32     GFR trend--> 40, 28, 49, much " improved with cutting back lasix dose   Urine Albumin in the last year - yes ordered  BP-goal < 130/80   On ACE or ARB: NO  DM II- goal of <7%   On SLGT2: NO  Avoid nephrotoxic agents/Adjusting nephrotoxic agents- improving with reduced lasix   Recommendation healthy physcial activity and heart healthy diet  Hydration with non sugar added or diuretic liquids           Relevant Orders    Albumin-Creatinine Ratio, Urine Random    Hemoglobin A1C    Comprehensive Metabolic Panel    CBC and Auto Differential    Follow Up In [facility] - PCP - Established    Chronic systolic congestive heart failure - Primary (Chronic) I50.22     Echo last-    ACE/ARB:No  Arni-No  SGLT2-No unable to afford farxiga   Diuretic- Yes, describe: lasix 20mg  reduced dose  BB-Yes, describe: metoprolol 25mg BID reduced dose   MRA-No    Follows with cardiology    Please limit your fluid intake to less than 2 L/day and your salt intake to less than 2 g/day.  Maintain daily weights and if greater than 3 to 5 pound weight gain in a 24 to 48-hour. Please call the office.           Atrial fibrillation, unspecified type (Multi) (Chronic) I48.91     Anticoagulation: Yes, describe: eliquis   Any concerns for bleeding: no  Rate Control: Yes, describe: metoprolol  Rhythm Control: No              Other Visit Diagnoses         Codes    Acute on chronic congestive heart failure, unspecified heart failure type     I50.9    Relevant Medications    furosemide (Lasix) 40 mg tablet    Abnormal finding of blood chemistry, unspecified     R79.9    Relevant Orders    Hemoglobin A1C

## 2025-01-23 NOTE — ASSESSMENT & PLAN NOTE
Echo last-    ACE/ARB:No  Arni-No  SGLT2-No unable to afford farxiga   Diuretic- Yes, describe: lasix 20mg  reduced dose  BB-Yes, describe: metoprolol 25mg BID reduced dose   MRA-No    Follows with cardiology    Please limit your fluid intake to less than 2 L/day and your salt intake to less than 2 g/day.  Maintain daily weights and if greater than 3 to 5 pound weight gain in a 24 to 48-hour. Please call the office.

## 2025-01-23 NOTE — ASSESSMENT & PLAN NOTE
GFR trend--> 40, 28, 49, much improved with cutting back lasix dose   Urine Albumin in the last year - yes ordered  BP-goal < 130/80   On ACE or ARB: NO  DM II- goal of <7%   On SLGT2: NO  Avoid nephrotoxic agents/Adjusting nephrotoxic agents- improving with reduced lasix   Recommendation healthy physcial activity and heart healthy diet  Hydration with non sugar added or diuretic liquids

## 2025-01-24 ENCOUNTER — HOME CARE VISIT (OUTPATIENT)
Dept: HOME HEALTH SERVICES | Facility: HOME HEALTH | Age: OVER 89
End: 2025-01-24
Payer: MEDICARE

## 2025-01-24 VITALS
DIASTOLIC BLOOD PRESSURE: 88 MMHG | RESPIRATION RATE: 16 BRPM | SYSTOLIC BLOOD PRESSURE: 136 MMHG | HEART RATE: 96 BPM | OXYGEN SATURATION: 99 %

## 2025-01-24 PROCEDURE — G0299 HHS/HOSPICE OF RN EA 15 MIN: HCPCS | Mod: HHH

## 2025-01-24 ASSESSMENT — PAIN SCALES - PAIN ASSESSMENT IN ADVANCED DEMENTIA (PAINAD)
FACIALEXPRESSION: 0 - SMILING OR INEXPRESSIVE.
TOTALSCORE: 0
BODYLANGUAGE: 0
NEGVOCALIZATION: 0 - NONE.
FACIALEXPRESSION: 0
CONSOLABILITY: 0 - NO NEED TO CONSOLE.
BODYLANGUAGE: 0 - RELAXED.
NEGVOCALIZATION: 0
CONSOLABILITY: 0
BREATHING: 0

## 2025-01-24 ASSESSMENT — ENCOUNTER SYMPTOMS
DENIES PAIN: 1
DESCRIPTION OF MEMORY LOSS: SHORT TERM
CHANGE IN APPETITE: UNCHANGED
FATIGUE: 1
LAST BOWEL MOVEMENT: 67228
APPETITE LEVEL: GOOD
PERSON REPORTING PAIN: PATIENT
MUSCLE WEAKNESS: 1
FATIGUES EASILY: 1

## 2025-01-24 ASSESSMENT — ACTIVITIES OF DAILY LIVING (ADL)
AMBULATION ASSISTANCE: STAND BY ASSIST
CURRENT_FUNCTION: STAND BY ASSIST

## 2025-01-24 NOTE — HOME HEALTH
With this SN visit, patient presented alert and oriented x 3 and was pleasant and cooperative. Daughter present and engaged. Patient sat at dining room table for assessment. The skill of a nurse remains needed for assessment of CP status due to hypertensive heart and chronic kidney disease with heart failure. Discussed DC from services next week if goals are met. Patient's daughter verbalized understanding.

## 2025-01-28 ENCOUNTER — HOME CARE VISIT (OUTPATIENT)
Dept: HOME HEALTH SERVICES | Facility: HOME HEALTH | Age: OVER 89
End: 2025-01-28
Payer: MEDICARE

## 2025-01-28 VITALS
BODY MASS INDEX: 24 KG/M2 | SYSTOLIC BLOOD PRESSURE: 130 MMHG | WEIGHT: 127 LBS | OXYGEN SATURATION: 96 % | TEMPERATURE: 97.3 F | RESPIRATION RATE: 18 BRPM | HEART RATE: 72 BPM | DIASTOLIC BLOOD PRESSURE: 82 MMHG

## 2025-01-28 PROCEDURE — G0300 HHS/HOSPICE OF LPN EA 15 MIN: HCPCS | Mod: HHH

## 2025-01-28 ASSESSMENT — ENCOUNTER SYMPTOMS
CHANGE IN APPETITE: UNCHANGED
DYSPNEA ACTIVITY LEVEL: AFTER AMBULATING 10 - 20 FT
SHORTNESS OF BREATH: 1
DENIES PAIN: 1
APPETITE LEVEL: FAIR
DYSPNEA ON EXERTION: 1
FATIGUES EASILY: 1

## 2025-01-31 ENCOUNTER — APPOINTMENT (OUTPATIENT)
Dept: CARDIOLOGY | Facility: HOSPITAL | Age: OVER 89
End: 2025-01-31
Payer: MEDICARE

## 2025-02-01 ENCOUNTER — APPOINTMENT (OUTPATIENT)
Dept: HOME HEALTH SERVICES | Facility: HOME HEALTH | Age: OVER 89
End: 2025-02-01
Payer: MEDICARE

## 2025-02-01 ENCOUNTER — HOME CARE VISIT (OUTPATIENT)
Dept: HOME HEALTH SERVICES | Facility: HOME HEALTH | Age: OVER 89
End: 2025-02-01
Payer: MEDICARE

## 2025-02-01 ASSESSMENT — ENCOUNTER SYMPTOMS
PERSON REPORTING PAIN: PATIENT
DENIES PAIN: 1

## 2025-02-01 ASSESSMENT — PAIN SCALES - PAIN ASSESSMENT IN ADVANCED DEMENTIA (PAINAD)
BREATHING: 0
TOTALSCORE: 0
CONSOLABILITY: 0 - NO NEED TO CONSOLE.
BODYLANGUAGE: 0
BODYLANGUAGE: 0 - RELAXED.
NEGVOCALIZATION: 0 - NONE.
CONSOLABILITY: 0
FACIALEXPRESSION: 0 - SMILING OR INEXPRESSIVE.
FACIALEXPRESSION: 0
NEGVOCALIZATION: 0

## 2025-02-01 ASSESSMENT — ACTIVITIES OF DAILY LIVING (ADL)
OASIS_M1830: 01
HOME_HEALTH_OASIS: 00

## 2025-02-01 NOTE — HOME HEALTH
Spoke with daughter who reports she is monitoring patient's BP, pulse and SPO2 which have all been WNL x last 2 weeks. Therefore discharged patient from services due to goals met. Instructed daughter to follow up with PCP for any change in patient's condition. Daughter verbalized understanding.

## 2025-02-04 ENCOUNTER — APPOINTMENT (OUTPATIENT)
Dept: CARDIOLOGY | Facility: HOSPITAL | Age: OVER 89
End: 2025-02-04
Payer: MEDICARE

## 2025-02-24 NOTE — TELEPHONE ENCOUNTER
Patients daughter in law says that they are now comfortable with the hospice referral. They would like a call once the referral is in.

## 2025-02-24 NOTE — TELEPHONE ENCOUNTER
Is she comfortable now with a referral to hospice? I know she is reporting not wanting interventions or to see further specialist care. I think hospice sounds like the next step, I can work on setting up someone to go talk to them.

## 2025-02-24 NOTE — TELEPHONE ENCOUNTER
[facility] called to provide update on patient they will be meeting with the family tomorrow to discuss hospice services

## 2025-02-24 NOTE — TELEPHONE ENCOUNTER
"Patients daughter in law says she feels like she is not doing well. Patient says that she cannot walk on her own they are having to physically out of the chair, her BP is up and down, she is not sleeping has little to know appetite, patient seems to be hallucinating as she is talking all the time, patient says she will not go to the ED because she \"is done with life\" O2 seems to be normal. lift her out of her chair, her BP is She says that she would like to get some help with her.   "

## 2025-02-27 ENCOUNTER — TELEPHONE (OUTPATIENT)
Dept: CARDIOLOGY | Facility: HOSPITAL | Age: OVER 89
End: 2025-02-27
Payer: MEDICARE

## 2025-02-27 ENCOUNTER — TELEPHONE (OUTPATIENT)
Dept: PRIMARY CARE | Facility: CLINIC | Age: OVER 89
End: 2025-02-27
Payer: MEDICARE

## 2025-02-27 NOTE — TELEPHONE ENCOUNTER
Dr. Avina notified and called the patients family and gave them our condolences.  Maci said that Linda went peacefully around 6 last night.

## 2025-06-06 ENCOUNTER — APPOINTMENT (OUTPATIENT)
Dept: PRIMARY CARE | Facility: CLINIC | Age: OVER 89
End: 2025-06-06
Payer: MEDICARE